# Patient Record
Sex: MALE | Race: WHITE | Employment: FULL TIME | URBAN - METROPOLITAN AREA
[De-identification: names, ages, dates, MRNs, and addresses within clinical notes are randomized per-mention and may not be internally consistent; named-entity substitution may affect disease eponyms.]

---

## 2017-01-13 ENCOUNTER — HOSPITAL ENCOUNTER (EMERGENCY)
Age: 57
Discharge: HOME | End: 2017-01-13
Attending: EMERGENCY MEDICINE
Payer: COMMERCIAL

## 2017-01-13 VITALS
HEIGHT: 72 IN | DIASTOLIC BLOOD PRESSURE: 74 MMHG | SYSTOLIC BLOOD PRESSURE: 160 MMHG | BODY MASS INDEX: 31.83 KG/M2 | WEIGHT: 235 LBS | RESPIRATION RATE: 20 BRPM | OXYGEN SATURATION: 100 % | HEART RATE: 76 BPM | TEMPERATURE: 97.5 F

## 2017-01-13 DIAGNOSIS — R00.2 PALPITATIONS: Primary | ICD-10-CM

## 2017-01-13 LAB
ANION GAP SERPL CALCULATED.3IONS-SCNC: 13 MEQ/L (ref 7–13)
BUN BLDV-MCNC: 17 MG/DL (ref 6–20)
CALCIUM SERPL-MCNC: 8.8 MG/DL (ref 8.6–10.2)
CHLORIDE BLD-SCNC: 100 MEQ/L (ref 98–107)
CO2: 24 MEQ/L (ref 22–29)
CREAT SERPL-MCNC: 0.86 MG/DL (ref 0.7–1.2)
GFR AFRICAN AMERICAN: >60
GFR NON-AFRICAN AMERICAN: >60
GLUCOSE BLD-MCNC: 97 MG/DL (ref 74–109)
HCT VFR BLD CALC: 41.1 % (ref 42–52)
HEMOGLOBIN: 13.9 G/DL (ref 14–18)
MAGNESIUM: 2 MG/DL (ref 1.7–2.3)
MCH RBC QN AUTO: 29.7 PG (ref 27–31.3)
MCHC RBC AUTO-ENTMCNC: 33.8 % (ref 33–37)
MCV RBC AUTO: 88 FL (ref 80–100)
PDW BLD-RTO: 12.9 % (ref 11.5–14.5)
PLATELET # BLD: 74 K/UL (ref 130–400)
PLATELET SLIDE REVIEW: ABNORMAL
POTASSIUM SERPL-SCNC: 3.7 MEQ/L (ref 3.5–5.1)
RBC # BLD: 4.68 M/UL (ref 4.7–6.1)
SODIUM BLD-SCNC: 137 MEQ/L (ref 132–144)
TROPONIN: <0.01 NG/ML (ref 0–0.01)
TSH SERPL DL<=0.05 MIU/L-ACNC: 2.86 UIU/ML (ref 0.27–4.2)
WBC # BLD: 6.2 K/UL (ref 4.8–10.8)

## 2017-01-13 PROCEDURE — 80048 BASIC METABOLIC PNL TOTAL CA: CPT

## 2017-01-13 PROCEDURE — 84443 ASSAY THYROID STIM HORMONE: CPT

## 2017-01-13 PROCEDURE — 99284 EMERGENCY DEPT VISIT MOD MDM: CPT

## 2017-01-13 PROCEDURE — 84484 ASSAY OF TROPONIN QUANT: CPT

## 2017-01-13 PROCEDURE — 36415 COLL VENOUS BLD VENIPUNCTURE: CPT

## 2017-01-13 PROCEDURE — 83735 ASSAY OF MAGNESIUM: CPT

## 2017-01-13 PROCEDURE — 85027 COMPLETE CBC AUTOMATED: CPT

## 2017-01-13 PROCEDURE — 93005 ELECTROCARDIOGRAM TRACING: CPT

## 2017-01-13 RX ORDER — METOPROLOL SUCCINATE 100 MG/1
TABLET, EXTENDED RELEASE ORAL
COMMUNITY

## 2017-01-13 RX ORDER — ATORVASTATIN CALCIUM 10 MG/1
TABLET, FILM COATED ORAL
COMMUNITY

## 2017-01-13 RX ORDER — LISINOPRIL 10 MG/1
TABLET ORAL
COMMUNITY

## 2017-01-13 ASSESSMENT — ENCOUNTER SYMPTOMS
VOMITING: 0
COUGH: 0
SHORTNESS OF BREATH: 0

## 2017-01-13 ASSESSMENT — PAIN SCALES - GENERAL: PAINLEVEL_OUTOF10: 2

## 2017-01-13 ASSESSMENT — PAIN DESCRIPTION - DESCRIPTORS: DESCRIPTORS: PRESSURE

## 2017-01-13 ASSESSMENT — PAIN DESCRIPTION - LOCATION: LOCATION: CHEST

## 2017-01-16 LAB
EKG ATRIAL RATE: 73 BPM
EKG P AXIS: 69 DEGREES
EKG P-R INTERVAL: 196 MS
EKG Q-T INTERVAL: 394 MS
EKG QRS DURATION: 88 MS
EKG QTC CALCULATION (BAZETT): 434 MS
EKG R AXIS: 22 DEGREES
EKG T AXIS: 48 DEGREES
EKG VENTRICULAR RATE: 73 BPM

## 2017-01-17 ENCOUNTER — HOSPITAL ENCOUNTER (OUTPATIENT)
Dept: GENERAL RADIOLOGY | Age: 57
Discharge: HOME | End: 2017-01-17
Payer: COMMERCIAL

## 2017-01-17 DIAGNOSIS — R06.89 DIFFICULTY BREATHING: ICD-10-CM

## 2017-01-17 PROCEDURE — 71020 XR CHEST STANDARD TWO VW: CPT

## 2017-01-30 ENCOUNTER — HOSPITAL ENCOUNTER (OUTPATIENT)
Dept: PULMONOLOGY | Age: 57
Discharge: HOME | End: 2017-01-30
Payer: COMMERCIAL

## 2017-01-30 PROCEDURE — 94060 EVALUATION OF WHEEZING: CPT

## 2017-05-08 ENCOUNTER — HOSPITAL ENCOUNTER (OUTPATIENT)
Dept: CT IMAGING | Age: 57
Discharge: HOME | End: 2017-05-08
Payer: COMMERCIAL

## 2017-05-08 ENCOUNTER — HOSPITAL ENCOUNTER (OUTPATIENT)
Dept: PULMONOLOGY | Age: 57
Discharge: HOME | End: 2017-05-08
Payer: COMMERCIAL

## 2017-05-08 VITALS — WEIGHT: 240 LBS | BODY MASS INDEX: 32.51 KG/M2 | HEIGHT: 72 IN

## 2017-05-08 DIAGNOSIS — R06.02 BREATH SHORTNESS: ICD-10-CM

## 2017-05-08 PROCEDURE — 94729 DIFFUSING CAPACITY: CPT

## 2017-05-08 PROCEDURE — 94010 BREATHING CAPACITY TEST: CPT

## 2017-05-08 PROCEDURE — 71275 CT ANGIOGRAPHY CHEST: CPT

## 2017-05-08 PROCEDURE — 94726 PLETHYSMOGRAPHY LUNG VOLUMES: CPT

## 2017-05-08 PROCEDURE — 6360000004 HC RX CONTRAST MEDICATION: Performed by: RADIOLOGY

## 2017-05-08 RX ADMIN — IOPAMIDOL 100 ML: 755 INJECTION, SOLUTION INTRAVENOUS at 12:52

## 2017-10-27 LAB
ALBUMIN SERPL-MCNC: 4.2 G/DL (ref 3.9–4.9)
ALP BLD-CCNC: 93 U/L (ref 35–104)
ALT SERPL-CCNC: 71 U/L (ref 0–41)
ANION GAP SERPL CALCULATED.3IONS-SCNC: 14 MEQ/L (ref 7–13)
AST SERPL-CCNC: 82 U/L (ref 0–40)
BILIRUB SERPL-MCNC: 0.7 MG/DL (ref 0–1.2)
BUN BLDV-MCNC: 13 MG/DL (ref 6–20)
CALCIUM SERPL-MCNC: 9.4 MG/DL (ref 8.6–10.2)
CHLORIDE BLD-SCNC: 96 MEQ/L (ref 98–107)
CHOLESTEROL, TOTAL: 148 MG/DL (ref 0–199)
CO2: 23 MEQ/L (ref 22–29)
CREAT SERPL-MCNC: 0.73 MG/DL (ref 0.7–1.2)
GFR AFRICAN AMERICAN: >60
GFR NON-AFRICAN AMERICAN: >60
GLOBULIN: 2.7 G/DL (ref 2.3–3.5)
GLUCOSE BLD-MCNC: 119 MG/DL (ref 74–109)
HDLC SERPL-MCNC: 35 MG/DL (ref 40–59)
LDL CHOLESTEROL CALCULATED: 76 MG/DL (ref 0–129)
POTASSIUM SERPL-SCNC: 4.2 MEQ/L (ref 3.5–5.1)
SODIUM BLD-SCNC: 133 MEQ/L (ref 132–144)
TOTAL PROTEIN: 6.9 G/DL (ref 6.4–8.1)
TRIGL SERPL-MCNC: 187 MG/DL (ref 0–200)

## 2018-06-22 LAB
ALBUMIN SERPL-MCNC: 4 G/DL (ref 3.9–4.9)
ALP BLD-CCNC: 92 U/L (ref 35–104)
ALT SERPL-CCNC: 42 U/L (ref 0–41)
ANION GAP SERPL CALCULATED.3IONS-SCNC: 15 MEQ/L (ref 7–13)
AST SERPL-CCNC: 72 U/L (ref 0–40)
BILIRUB SERPL-MCNC: 1.2 MG/DL (ref 0–1.2)
BUN BLDV-MCNC: 15 MG/DL (ref 6–20)
CALCIUM SERPL-MCNC: 9.6 MG/DL (ref 8.6–10.2)
CHLORIDE BLD-SCNC: 99 MEQ/L (ref 98–107)
CHOLESTEROL, TOTAL: 144 MG/DL (ref 0–199)
CO2: 24 MEQ/L (ref 22–29)
CREAT SERPL-MCNC: 0.71 MG/DL (ref 0.7–1.2)
GFR AFRICAN AMERICAN: >60
GFR NON-AFRICAN AMERICAN: >60
GLOBULIN: 3.3 G/DL (ref 2.3–3.5)
GLUCOSE BLD-MCNC: 128 MG/DL (ref 74–109)
HDLC SERPL-MCNC: 35 MG/DL (ref 40–59)
LDL CHOLESTEROL CALCULATED: 82 MG/DL (ref 0–129)
POTASSIUM SERPL-SCNC: 4.3 MEQ/L (ref 3.5–5.1)
SODIUM BLD-SCNC: 138 MEQ/L (ref 132–144)
TOTAL PROTEIN: 7.3 G/DL (ref 6.4–8.1)
TRIGL SERPL-MCNC: 135 MG/DL (ref 0–200)

## 2020-02-20 LAB
ALBUMIN SERPL-MCNC: 4.5 G/DL (ref 3.5–4.6)
ALP BLD-CCNC: 78 U/L (ref 35–104)
ALT SERPL-CCNC: 22 U/L (ref 0–41)
ANION GAP SERPL CALCULATED.3IONS-SCNC: 16 MEQ/L (ref 9–15)
AST SERPL-CCNC: 25 U/L (ref 0–40)
BILIRUB SERPL-MCNC: 0.7 MG/DL (ref 0.2–0.7)
BUN BLDV-MCNC: 15 MG/DL (ref 6–20)
CALCIUM SERPL-MCNC: 9.6 MG/DL (ref 8.5–9.9)
CHLORIDE BLD-SCNC: 103 MEQ/L (ref 95–107)
CHOLESTEROL, FASTING: 124 MG/DL (ref 0–199)
CO2: 20 MEQ/L (ref 20–31)
CREAT SERPL-MCNC: 0.73 MG/DL (ref 0.7–1.2)
GFR AFRICAN AMERICAN: >60
GFR NON-AFRICAN AMERICAN: >60
GLOBULIN: 2.7 G/DL (ref 2.3–3.5)
GLUCOSE FASTING: 111 MG/DL (ref 70–99)
HDLC SERPL-MCNC: 39 MG/DL (ref 40–59)
LDL CHOLESTEROL CALCULATED: 57 MG/DL (ref 0–129)
POTASSIUM SERPL-SCNC: 4.4 MEQ/L (ref 3.4–4.9)
SODIUM BLD-SCNC: 139 MEQ/L (ref 135–144)
TOTAL PROTEIN: 7.2 G/DL (ref 6.3–8)
TRIGLYCERIDE, FASTING: 138 MG/DL (ref 0–150)

## 2020-09-17 LAB
ALBUMIN SERPL-MCNC: 4.4 G/DL (ref 3.5–4.6)
ALP BLD-CCNC: 79 U/L (ref 35–104)
ALT SERPL-CCNC: 25 U/L (ref 0–41)
ANION GAP SERPL CALCULATED.3IONS-SCNC: 11 MEQ/L (ref 9–15)
AST SERPL-CCNC: 25 U/L (ref 0–40)
BILIRUB SERPL-MCNC: 0.6 MG/DL (ref 0.2–0.7)
BUN BLDV-MCNC: 14 MG/DL (ref 6–20)
CALCIUM SERPL-MCNC: 9.4 MG/DL (ref 8.5–9.9)
CHLORIDE BLD-SCNC: 102 MEQ/L (ref 95–107)
CHOLESTEROL, TOTAL: 122 MG/DL (ref 0–199)
CO2: 25 MEQ/L (ref 20–31)
CREAT SERPL-MCNC: 0.76 MG/DL (ref 0.7–1.2)
GFR AFRICAN AMERICAN: >60
GFR NON-AFRICAN AMERICAN: >60
GLOBULIN: 2.6 G/DL (ref 2.3–3.5)
GLUCOSE BLD-MCNC: 119 MG/DL (ref 70–99)
HDLC SERPL-MCNC: 36 MG/DL (ref 40–59)
LDL CHOLESTEROL CALCULATED: 55 MG/DL (ref 0–129)
POTASSIUM SERPL-SCNC: 4.6 MEQ/L (ref 3.4–4.9)
SODIUM BLD-SCNC: 138 MEQ/L (ref 135–144)
TOTAL PROTEIN: 7 G/DL (ref 6.3–8)
TRIGL SERPL-MCNC: 154 MG/DL (ref 0–150)

## 2023-02-04 PROBLEM — R79.89 ABNORMAL LFTS: Status: ACTIVE | Noted: 2023-02-04

## 2023-02-04 PROBLEM — D73.1 HYPERSPLENISM: Status: ACTIVE | Noted: 2023-02-04

## 2023-02-04 PROBLEM — D69.6 THROMBOCYTOPENIA (CMS-HCC): Status: ACTIVE | Noted: 2023-02-04

## 2023-02-04 PROBLEM — K76.0 FATTY LIVER: Status: ACTIVE | Noted: 2023-02-04

## 2023-02-04 PROBLEM — R09.81 CHRONIC NASAL CONGESTION: Status: ACTIVE | Noted: 2023-02-04

## 2023-02-04 PROBLEM — I25.10 ATHEROSCLEROSIS OF NATIVE CORONARY ARTERY WITHOUT ANGINA PECTORIS: Status: ACTIVE | Noted: 2023-02-04

## 2023-02-04 PROBLEM — F17.200 NICOTINE DEPENDENCE: Status: ACTIVE | Noted: 2023-02-04

## 2023-02-04 PROBLEM — K56.609 SMALL BOWEL OBSTRUCTION (MULTI): Status: ACTIVE | Noted: 2023-02-04

## 2023-02-04 PROBLEM — R41.3 MEMORY CHANGES: Status: ACTIVE | Noted: 2023-02-04

## 2023-02-04 PROBLEM — R06.83 SNORING: Status: ACTIVE | Noted: 2023-02-04

## 2023-02-04 PROBLEM — H69.90 EUSTACHIAN TUBE DYSFUNCTION: Status: ACTIVE | Noted: 2023-02-04

## 2023-02-04 PROBLEM — R05.9 COUGH: Status: ACTIVE | Noted: 2023-02-04

## 2023-02-04 PROBLEM — N52.9 INABILITY TO MAINTAIN ERECTION: Status: ACTIVE | Noted: 2023-02-04

## 2023-02-04 PROBLEM — E66.811 CLASS 1 OBESITY WITH BODY MASS INDEX (BMI) OF 33.0 TO 33.9 IN ADULT: Status: ACTIVE | Noted: 2023-02-04

## 2023-02-04 PROBLEM — F10.11 HISTORY OF ALCOHOL ABUSE: Status: ACTIVE | Noted: 2023-02-04

## 2023-02-04 PROBLEM — R00.2 PALPITATIONS: Status: ACTIVE | Noted: 2023-02-04

## 2023-02-04 PROBLEM — R40.0 SOMNOLENCE, DAYTIME: Status: ACTIVE | Noted: 2023-02-04

## 2023-02-04 PROBLEM — Z98.61 HISTORY OF PTCA: Status: ACTIVE | Noted: 2023-02-04

## 2023-02-04 PROBLEM — H90.3 BILATERAL SENSORINEURAL HEARING LOSS: Status: ACTIVE | Noted: 2023-02-04

## 2023-02-04 PROBLEM — E11.9 TYPE 2 DIABETES MELLITUS WITHOUT COMPLICATION, WITHOUT LONG-TERM CURRENT USE OF INSULIN (MULTI): Status: ACTIVE | Noted: 2023-02-04

## 2023-02-04 PROBLEM — K74.60 CIRRHOSIS (MULTI): Status: ACTIVE | Noted: 2023-02-04

## 2023-02-04 PROBLEM — Z96.22 RETAINED MYRINGOTOMY TUBE: Status: ACTIVE | Noted: 2023-02-04

## 2023-02-04 PROBLEM — H91.93 HEARING LOSS, BILATERAL: Status: ACTIVE | Noted: 2023-02-04

## 2023-02-04 PROBLEM — E66.9 CLASS 1 OBESITY WITH BODY MASS INDEX (BMI) OF 33.0 TO 33.9 IN ADULT: Status: ACTIVE | Noted: 2023-02-04

## 2023-02-04 PROBLEM — I25.2 PAST MYOCARDIAL INFARCTION: Status: ACTIVE | Noted: 2023-02-04

## 2023-02-04 PROBLEM — R42 DIZZINESS: Status: ACTIVE | Noted: 2023-02-04

## 2023-02-04 PROBLEM — R79.89 ELEVATED SERUM CREATININE: Status: ACTIVE | Noted: 2023-02-04

## 2023-02-04 PROBLEM — E78.5 HYPERLIPIDEMIA: Status: ACTIVE | Noted: 2023-02-04

## 2023-02-04 PROBLEM — I10 HYPERTENSION: Status: ACTIVE | Noted: 2023-02-04

## 2023-02-04 PROBLEM — G47.00 INSOMNIA: Status: ACTIVE | Noted: 2023-02-04

## 2023-02-04 RX ORDER — METFORMIN HYDROCHLORIDE 500 MG/1
1 TABLET, EXTENDED RELEASE ORAL
COMMUNITY
Start: 2019-02-04 | End: 2023-03-21 | Stop reason: SDUPTHER

## 2023-02-04 RX ORDER — NITROGLYCERIN 0.4 MG/1
1 TABLET SUBLINGUAL EVERY 5 MIN PRN
COMMUNITY
End: 2023-12-11

## 2023-02-04 RX ORDER — METOPROLOL SUCCINATE 50 MG/1
1 TABLET, EXTENDED RELEASE ORAL DAILY
COMMUNITY
Start: 2022-02-07 | End: 2023-12-11 | Stop reason: SDUPTHER

## 2023-02-04 RX ORDER — ASPIRIN 81 MG/1
1 TABLET ORAL DAILY
COMMUNITY
Start: 2017-01-17

## 2023-02-04 RX ORDER — ROSUVASTATIN CALCIUM 40 MG/1
1 TABLET, COATED ORAL DAILY
COMMUNITY
Start: 2016-03-31 | End: 2023-12-11 | Stop reason: SDUPTHER

## 2023-02-04 RX ORDER — LISINOPRIL 10 MG/1
1 TABLET ORAL DAILY
COMMUNITY
Start: 2016-03-31 | End: 2023-12-11 | Stop reason: SDUPTHER

## 2023-02-04 RX ORDER — TRIAMCINOLONE ACETONIDE 1 MG/G
1 CREAM TOPICAL 2 TIMES DAILY PRN
COMMUNITY
Start: 2019-06-11 | End: 2024-03-18 | Stop reason: SDUPTHER

## 2023-03-21 DIAGNOSIS — E11.9 TYPE 2 DIABETES MELLITUS WITHOUT COMPLICATION, WITHOUT LONG-TERM CURRENT USE OF INSULIN (MULTI): Primary | ICD-10-CM

## 2023-03-22 ENCOUNTER — APPOINTMENT (OUTPATIENT)
Dept: PRIMARY CARE | Facility: CLINIC | Age: 63
End: 2023-03-22

## 2023-03-22 RX ORDER — METFORMIN HYDROCHLORIDE 500 MG/1
500 TABLET, EXTENDED RELEASE ORAL
Qty: 90 TABLET | Refills: 0 | Status: SHIPPED | OUTPATIENT
Start: 2023-03-22 | End: 2023-06-29 | Stop reason: SDUPTHER

## 2023-06-29 DIAGNOSIS — E11.9 TYPE 2 DIABETES MELLITUS WITHOUT COMPLICATION, WITHOUT LONG-TERM CURRENT USE OF INSULIN (MULTI): ICD-10-CM

## 2023-06-29 RX ORDER — METFORMIN HYDROCHLORIDE 500 MG/1
500 TABLET, EXTENDED RELEASE ORAL
Qty: 90 TABLET | Refills: 0 | Status: SHIPPED | OUTPATIENT
Start: 2023-06-29 | End: 2023-09-25 | Stop reason: SDUPTHER

## 2023-07-27 LAB
ANION GAP IN SER/PLAS: 12 MMOL/L (ref 10–20)
CALCIUM (MG/DL) IN SER/PLAS: 10.1 MG/DL (ref 8.6–10.3)
CARBON DIOXIDE, TOTAL (MMOL/L) IN SER/PLAS: 27 MMOL/L (ref 21–32)
CHLORIDE (MMOL/L) IN SER/PLAS: 102 MMOL/L (ref 98–107)
CREATININE (MG/DL) IN SER/PLAS: 0.99 MG/DL (ref 0.5–1.3)
ESTIMATED AVERAGE GLUCOSE FOR HBA1C: 103 MG/DL
GFR MALE: 86 ML/MIN/1.73M2
GLUCOSE (MG/DL) IN SER/PLAS: 83 MG/DL (ref 74–99)
HEMOGLOBIN A1C/HEMOGLOBIN TOTAL IN BLOOD: 5.2 %
POTASSIUM (MMOL/L) IN SER/PLAS: 4 MMOL/L (ref 3.5–5.3)
SODIUM (MMOL/L) IN SER/PLAS: 137 MMOL/L (ref 136–145)
UREA NITROGEN (MG/DL) IN SER/PLAS: 18 MG/DL (ref 6–23)

## 2023-07-31 ENCOUNTER — OFFICE VISIT (OUTPATIENT)
Dept: PRIMARY CARE | Facility: CLINIC | Age: 63
End: 2023-07-31
Payer: COMMERCIAL

## 2023-07-31 VITALS
TEMPERATURE: 97.7 F | HEART RATE: 71 BPM | WEIGHT: 222 LBS | SYSTOLIC BLOOD PRESSURE: 104 MMHG | HEIGHT: 71 IN | DIASTOLIC BLOOD PRESSURE: 69 MMHG | BODY MASS INDEX: 31.08 KG/M2 | OXYGEN SATURATION: 98 %

## 2023-07-31 DIAGNOSIS — I25.10 ATHEROSCLEROSIS OF NATIVE CORONARY ARTERY OF NATIVE HEART WITHOUT ANGINA PECTORIS: ICD-10-CM

## 2023-07-31 DIAGNOSIS — I10 PRIMARY HYPERTENSION: Primary | ICD-10-CM

## 2023-07-31 DIAGNOSIS — E78.2 MIXED HYPERLIPIDEMIA: ICD-10-CM

## 2023-07-31 DIAGNOSIS — E11.9 TYPE 2 DIABETES MELLITUS WITHOUT COMPLICATION, WITHOUT LONG-TERM CURRENT USE OF INSULIN (MULTI): ICD-10-CM

## 2023-07-31 DIAGNOSIS — D69.6 THROMBOCYTOPENIA (CMS-HCC): ICD-10-CM

## 2023-07-31 DIAGNOSIS — E66.9 CLASS 1 OBESITY WITH SERIOUS COMORBIDITY AND BODY MASS INDEX (BMI) OF 30.0 TO 30.9 IN ADULT, UNSPECIFIED OBESITY TYPE: ICD-10-CM

## 2023-07-31 DIAGNOSIS — K70.30 ALCOHOLIC CIRRHOSIS OF LIVER WITHOUT ASCITES (MULTI): ICD-10-CM

## 2023-07-31 DIAGNOSIS — Z12.5 PROSTATE CANCER SCREENING: ICD-10-CM

## 2023-07-31 PROBLEM — F10.10 ALCOHOL ABUSE: Status: ACTIVE | Noted: 2023-07-31

## 2023-07-31 PROBLEM — H69.93 DYSFUNCTION OF BOTH EUSTACHIAN TUBES: Status: ACTIVE | Noted: 2019-02-11

## 2023-07-31 PROBLEM — R06.89 DIFFICULTY BREATHING: Status: ACTIVE | Noted: 2023-07-31

## 2023-07-31 PROBLEM — R19.8 ANAL DISCHARGE: Status: ACTIVE | Noted: 2023-07-31

## 2023-07-31 PROBLEM — Z98.890 HISTORY OF CARDIAC CATHETERIZATION: Status: ACTIVE | Noted: 2023-07-31

## 2023-07-31 PROBLEM — K76.6 PORTAL HYPERTENSION (MULTI): Status: ACTIVE | Noted: 2019-03-02

## 2023-07-31 PROBLEM — M77.11 LATERAL EPICONDYLITIS OF BOTH ELBOWS: Status: ACTIVE | Noted: 2023-07-31

## 2023-07-31 PROBLEM — M70.30 BURSITIS OF ELBOW: Status: ACTIVE | Noted: 2023-07-31

## 2023-07-31 PROBLEM — R10.9 FLANK PAIN: Status: ACTIVE | Noted: 2023-07-31

## 2023-07-31 PROBLEM — R06.83 SNORING: Status: RESOLVED | Noted: 2023-02-04 | Resolved: 2023-07-31

## 2023-07-31 PROBLEM — Z86.711 HISTORY OF PULMONARY EMBOLISM: Status: ACTIVE | Noted: 2023-07-31

## 2023-07-31 PROBLEM — Z86.39 HISTORY OF ELEVATED LIPIDS: Status: ACTIVE | Noted: 2023-07-31

## 2023-07-31 PROBLEM — Z20.822 CONTACT WITH AND (SUSPECTED) EXPOSURE TO COVID-19: Status: ACTIVE | Noted: 2022-04-13

## 2023-07-31 PROBLEM — H93.11 TINNITUS, RIGHT EAR: Status: ACTIVE | Noted: 2020-10-07

## 2023-07-31 PROBLEM — M77.12 LATERAL EPICONDYLITIS OF BOTH ELBOWS: Status: ACTIVE | Noted: 2023-07-31

## 2023-07-31 PROBLEM — Z86.69 HISTORY OF EAR DISORDER: Status: ACTIVE | Noted: 2023-07-31

## 2023-07-31 PROBLEM — R82.90 ABNORMAL FINDING IN URINE: Status: ACTIVE | Noted: 2023-07-31

## 2023-07-31 PROBLEM — L98.9 INFLAMMATORY DERMATOSIS: Status: ACTIVE | Noted: 2023-07-31

## 2023-07-31 PROBLEM — Z20.822 CONTACT WITH AND (SUSPECTED) EXPOSURE TO COVID-19: Status: RESOLVED | Noted: 2022-04-13 | Resolved: 2023-07-31

## 2023-07-31 PROCEDURE — 3044F HG A1C LEVEL LT 7.0%: CPT | Performed by: FAMILY MEDICINE

## 2023-07-31 PROCEDURE — 99214 OFFICE O/P EST MOD 30 MIN: CPT | Performed by: FAMILY MEDICINE

## 2023-07-31 PROCEDURE — 3008F BODY MASS INDEX DOCD: CPT | Performed by: FAMILY MEDICINE

## 2023-07-31 PROCEDURE — 3078F DIAST BP <80 MM HG: CPT | Performed by: FAMILY MEDICINE

## 2023-07-31 PROCEDURE — 1036F TOBACCO NON-USER: CPT | Performed by: FAMILY MEDICINE

## 2023-07-31 PROCEDURE — 3074F SYST BP LT 130 MM HG: CPT | Performed by: FAMILY MEDICINE

## 2023-07-31 PROCEDURE — 4010F ACE/ARB THERAPY RXD/TAKEN: CPT | Performed by: FAMILY MEDICINE

## 2023-07-31 RX ORDER — METOPROLOL TARTRATE 100 MG/1
50 TABLET ORAL 2 TIMES DAILY
COMMUNITY
Start: 2011-10-07 | End: 2023-12-11

## 2023-07-31 ASSESSMENT — PATIENT HEALTH QUESTIONNAIRE - PHQ9
SUM OF ALL RESPONSES TO PHQ9 QUESTIONS 1 AND 2: 0
2. FEELING DOWN, DEPRESSED OR HOPELESS: NOT AT ALL
1. LITTLE INTEREST OR PLEASURE IN DOING THINGS: NOT AT ALL

## 2023-07-31 NOTE — PATIENT INSTRUCTIONS
Follow up in 3 months with labs to be done PRIOR.    It was a pleasure to see you today. Thank you for choosing us for your health care needs.    If you have lab or other testing completed and have not been informed of results within one week, please call the office for your results.    If you haven't done so, consider signing up for [facility] [employer], the [facility] personal health record. Ask the staff how you can get started.

## 2023-07-31 NOTE — PROGRESS NOTES
Subjective   Patient ID: Yuri Reynolds is a 63 y.o. male who presents for follow up monitoring and management of multiple medical conditions.      HPI     No new concerns      Patient has hypertension.  Pt does not monitor BP readings at home.   He denies CP, SOB, and LE edema.   Patient is compliant with his antihypertensive therapy and denies any noted side effects.  He follows up with a cardiologist.      He has hyperlipidemia.  Pt does try to limit the amount of fatty foods and high cholesterol foods that are consumed.  He is compliant with the dosing of his statin therapy and denies any noted side effects.     Patient has DM type 2.   Patient does try to limit the amount of carbohydrates that are consumed.  Denies any hypoglycemic symptoms or readings.  He continues to tolerate the medications well.  He states that he does regularly check his blood sugar.      Patient has coronary artery disease and follows with cardiology as well.  He denies any chest pain, exertional shortness of breath, or limitation in his exertional activities.     He has cirrhosis of the liver.  Patient has continued to avoid alcohol.  He was previously evaluated by hepatology, Dr. Hartmann, but has not followed up since October 2019.  He denies any jaundice or changes in stool/urine color.  Patient also denies any melena or hematochezia.     He has had some thrombocytopenia.  He denies any abnormal bruising or bleeding at this time.     Review of Systems  Constitutional: Patient denies any fever, chills, loss of appetite, or unexplained weight loss.  Cardiovascular: Patient denies any chest pain, shortness of breath with exertion, tachycardia, palpitations, orthopnea, or paroxysmal nocturnal dyspnea.  Respiratory: Patient denies any cough, shortness breath, or wheezing.  Skin: Denies any rashes or skin lesions.   Neurology: Patient denies any new motor or sensory losses.  Denies any numbness, tingling, weakness, and incoordination of the  "extremities.  Patient also denies any tremor, seizures, or gait instability.  Endocrinology: Denies any polyuria, polydipsia, polyphagia, or heat/cold intolerance.    Objective   /69   Pulse 71   Temp 36.5 °C (97.7 °F)   Ht 1.803 m (5' 11\")   Wt 101 kg (222 lb)   SpO2 98%   BMI 30.96 kg/m²     Physical Exam  General Appearance: Alert and cooperative, in no acute distress, well-developed/well-nourished overweight male.    Neck: Supple and without adenopathy or rigidity.  There is no JVD at 90° and no carotid bruits are noted.  There is no thyromegaly, thyroid tenderness, or palpable thyroid nodules.  Heart: Regular rate and rhythm without murmur or ectopy.  Respiratory: Lungs are clear to auscultation bilaterally with good air exchange.  Good respiratory effort and no accessory muscle use.  Skin: Good turgor, moist, warm and without rashes or lesions.  Neurological exam: Alert and oriented ×3, no tremor, normal gait.  Extremities: No clubbing, cyanosis, or edema    Assessment/Plan     HTN: Blood pressure appears adequately controlled and we will continue with the current antihypertensive therapy.     Hyperlipidemia: Stable based on 5/18/22 labs. Patient will continue with the current medication. Dietary changes, exercise, and maintenance of healthy weight were discussed at length.     Diabetes mellitus type 2:   His most recent labs reveal hemoglobin A1c was:  Lab Results   Component Value Date    HGBA1C 5.2 07/27/2023   Patient was advised to have a diabetic eye exam annually.  Patient was also advised to check the feet on a regular basis.     CAD: Stable based on symptoms.   He is without any worrisome signs or symptoms for unstable angina.  Risk factor modification was discussed.  He will continue follow-up with cardiology as well.     Cirrhosis / Hx of alcohol abuse: He has continued to abstain from alcohol.  He has delayed follow up with hepatology and has not scheduled the Fibroscan ordered by " hepatology.  12/20/2022: Pt was again encouraged to schedule follow up and testing.     Thrombocytopenia: Stable based on most recent lab evaluation.   We will continue to monitor.  This is likely a result of his hypersplenism which is likely from his cirrhosis and increased portal pressure.     Obesity: Dietary changes, exercise, and maintenance of a healthy weight were discussed at length.        PSA due 5/19/23  COLONOSCOPY DUE 3/7/2022         Orders Placed This Encounter   Procedures    Prostate Specific Antigen, Screen    Comprehensive Metabolic Panel    Hemoglobin A1C    Lipid Panel    Albumin , Urine Random     Requested Prescriptions      No prescriptions requested or ordered in this encounter   We will continue on the current medications without change.

## 2023-09-25 DIAGNOSIS — E11.9 TYPE 2 DIABETES MELLITUS WITHOUT COMPLICATION, WITHOUT LONG-TERM CURRENT USE OF INSULIN (MULTI): ICD-10-CM

## 2023-09-25 RX ORDER — METFORMIN HYDROCHLORIDE 500 MG/1
500 TABLET, EXTENDED RELEASE ORAL
Qty: 90 TABLET | Refills: 0 | Status: SHIPPED | OUTPATIENT
Start: 2023-09-25 | End: 2023-12-18 | Stop reason: SDUPTHER

## 2023-10-31 ENCOUNTER — APPOINTMENT (OUTPATIENT)
Dept: PRIMARY CARE | Facility: CLINIC | Age: 63
End: 2023-10-31

## 2023-12-06 ENCOUNTER — LAB (OUTPATIENT)
Dept: LAB | Facility: LAB | Age: 63
End: 2023-12-06
Payer: COMMERCIAL

## 2023-12-06 DIAGNOSIS — E11.9 TYPE 2 DIABETES MELLITUS WITHOUT COMPLICATION, WITHOUT LONG-TERM CURRENT USE OF INSULIN (MULTI): ICD-10-CM

## 2023-12-06 DIAGNOSIS — Z12.5 PROSTATE CANCER SCREENING: ICD-10-CM

## 2023-12-06 DIAGNOSIS — E78.2 MIXED HYPERLIPIDEMIA: ICD-10-CM

## 2023-12-06 LAB
ALBUMIN SERPL BCP-MCNC: 4.5 G/DL (ref 3.4–5)
ALP SERPL-CCNC: 76 U/L (ref 33–136)
ALT SERPL W P-5'-P-CCNC: 26 U/L (ref 10–52)
ANION GAP SERPL CALC-SCNC: 12 MMOL/L (ref 10–20)
AST SERPL W P-5'-P-CCNC: 23 U/L (ref 9–39)
BILIRUB SERPL-MCNC: 0.8 MG/DL (ref 0–1.2)
BUN SERPL-MCNC: 18 MG/DL (ref 6–23)
CALCIUM SERPL-MCNC: 9.5 MG/DL (ref 8.6–10.3)
CHLORIDE SERPL-SCNC: 102 MMOL/L (ref 98–107)
CHOLEST SERPL-MCNC: 135 MG/DL (ref 0–199)
CHOLESTEROL/HDL RATIO: 3.9
CO2 SERPL-SCNC: 29 MMOL/L (ref 21–32)
CREAT SERPL-MCNC: 0.94 MG/DL (ref 0.5–1.3)
CREAT UR-MCNC: 143.7 MG/DL (ref 20–370)
EST. AVERAGE GLUCOSE BLD GHB EST-MCNC: 111 MG/DL
GFR SERPL CREATININE-BSD FRML MDRD: >90 ML/MIN/1.73M*2
GLUCOSE SERPL-MCNC: 116 MG/DL (ref 74–99)
HBA1C MFR BLD: 5.5 %
HDLC SERPL-MCNC: 35 MG/DL
LDLC SERPL CALC-MCNC: 65 MG/DL
MICROALBUMIN UR-MCNC: 10.3 MG/L
MICROALBUMIN/CREAT UR: 7.2 UG/MG CREAT
NON HDL CHOLESTEROL: 100 MG/DL (ref 0–149)
POTASSIUM SERPL-SCNC: 4.2 MMOL/L (ref 3.5–5.3)
PROT SERPL-MCNC: 6.7 G/DL (ref 6.4–8.2)
PSA SERPL-MCNC: 0.39 NG/ML
SODIUM SERPL-SCNC: 139 MMOL/L (ref 136–145)
TRIGL SERPL-MCNC: 175 MG/DL (ref 0–149)
VLDL: 35 MG/DL (ref 0–40)

## 2023-12-06 PROCEDURE — 82570 ASSAY OF URINE CREATININE: CPT

## 2023-12-06 PROCEDURE — 83036 HEMOGLOBIN GLYCOSYLATED A1C: CPT

## 2023-12-06 PROCEDURE — 84153 ASSAY OF PSA TOTAL: CPT

## 2023-12-06 PROCEDURE — 80061 LIPID PANEL: CPT

## 2023-12-06 PROCEDURE — 36415 COLL VENOUS BLD VENIPUNCTURE: CPT

## 2023-12-06 PROCEDURE — 82043 UR ALBUMIN QUANTITATIVE: CPT

## 2023-12-06 PROCEDURE — 80053 COMPREHEN METABOLIC PANEL: CPT

## 2023-12-11 ENCOUNTER — OFFICE VISIT (OUTPATIENT)
Dept: CARDIOLOGY | Facility: CLINIC | Age: 63
End: 2023-12-11
Payer: COMMERCIAL

## 2023-12-11 VITALS
WEIGHT: 244 LBS | HEART RATE: 63 BPM | SYSTOLIC BLOOD PRESSURE: 130 MMHG | DIASTOLIC BLOOD PRESSURE: 70 MMHG | BODY MASS INDEX: 34.03 KG/M2

## 2023-12-11 DIAGNOSIS — E11.9 TYPE 2 DIABETES MELLITUS WITHOUT COMPLICATION, WITHOUT LONG-TERM CURRENT USE OF INSULIN (MULTI): ICD-10-CM

## 2023-12-11 DIAGNOSIS — I10 PRIMARY HYPERTENSION: ICD-10-CM

## 2023-12-11 DIAGNOSIS — E78.2 MIXED HYPERLIPIDEMIA: ICD-10-CM

## 2023-12-11 DIAGNOSIS — I25.10 ATHEROSCLEROSIS OF NATIVE CORONARY ARTERY OF NATIVE HEART WITHOUT ANGINA PECTORIS: Primary | ICD-10-CM

## 2023-12-11 DIAGNOSIS — Z98.61 HISTORY OF PTCA: ICD-10-CM

## 2023-12-11 PROCEDURE — 1036F TOBACCO NON-USER: CPT | Performed by: INTERNAL MEDICINE

## 2023-12-11 PROCEDURE — 3048F LDL-C <100 MG/DL: CPT | Performed by: INTERNAL MEDICINE

## 2023-12-11 PROCEDURE — 3008F BODY MASS INDEX DOCD: CPT | Performed by: INTERNAL MEDICINE

## 2023-12-11 PROCEDURE — 99214 OFFICE O/P EST MOD 30 MIN: CPT | Performed by: INTERNAL MEDICINE

## 2023-12-11 PROCEDURE — 3044F HG A1C LEVEL LT 7.0%: CPT | Performed by: INTERNAL MEDICINE

## 2023-12-11 PROCEDURE — 3078F DIAST BP <80 MM HG: CPT | Performed by: INTERNAL MEDICINE

## 2023-12-11 PROCEDURE — 4010F ACE/ARB THERAPY RXD/TAKEN: CPT | Performed by: INTERNAL MEDICINE

## 2023-12-11 PROCEDURE — 3075F SYST BP GE 130 - 139MM HG: CPT | Performed by: INTERNAL MEDICINE

## 2023-12-11 PROCEDURE — 3061F NEG MICROALBUMINURIA REV: CPT | Performed by: INTERNAL MEDICINE

## 2023-12-11 RX ORDER — ROSUVASTATIN CALCIUM 40 MG/1
40 TABLET, COATED ORAL DAILY
Qty: 90 TABLET | Refills: 3 | Status: SHIPPED | OUTPATIENT
Start: 2023-12-11

## 2023-12-11 RX ORDER — METOPROLOL SUCCINATE 50 MG/1
50 TABLET, EXTENDED RELEASE ORAL DAILY
Qty: 90 TABLET | Refills: 3 | Status: SHIPPED | OUTPATIENT
Start: 2023-12-11

## 2023-12-11 RX ORDER — LISINOPRIL 10 MG/1
10 TABLET ORAL DAILY
Qty: 90 TABLET | Refills: 3 | Status: SHIPPED | OUTPATIENT
Start: 2023-12-11

## 2023-12-11 NOTE — PROGRESS NOTES
Patient:  Yuri Reynolds  YOB: 1960  MRN: [id number]       HPI:       Yuri Reynolds is a 63 y.o. male who returns today for cardiac follow-up.  He has a history of atherosclerotic heart disease with remote inferior wall myocardial infarction 1999. He had an apical myocardial infarction in August 2003. He underwent angioplasty and stenting of the right coronary artery in 1999 and in 2003. A myocardial perfusion study in April 2015 was negative for ischemia with calculated LV ejection fraction of 65%. He has a history of essential hypertension, type 2 diabetes mellitus, and hyperlipidemia. He stopped smoking tobacco back in January 2017.      He previously noted some intermittent palpitations and a Holter monitor showed normal sinus rhythm with occasional PACs and PVCs. His symptoms correlated with the ectopy. He also had some exertional shortness breath in the setting of progressive weight gain. Extensive pulmonary evaluation was unremarkable. An echocardiogram in August 2017 showed normal LV systolic function and normal valvular structure and function. He was treated in 2019 for a bowel obstruction. He was managed conservatively and it resolved. In the process he was diagnosed with cirrhosis of the liver. He was drinking quite heavily at the time but has completely stopped using any alcohol. He also stopped smoking.     He has been doing well since his last visit.  He works full-time for [employer].  He is without any specific cardiovascular related complaints. He denies any chest pain or shortness of breath. He denies any orthopnea, PND, or increasing peripheral edema. He denies any palpitations, near-syncope, or syncope. He denies any fever, chills, or cough. He denies any nausea, vomiting, or diaphoresis. He denies any hemoptysis, hematemesis, melena, or hematochezia. Lab studies December 6, 2023 showed a normal comprehensive metabolic profile with exception of glucose 116.   Hemoglobin A1c was 5.5.  Cholesterol 135 with HDL 35, LDL 65, and triglycerides 175.  PSA was normal.  Other details as noted below.     The above portion of this note was dictated by me using voice recognition software. I personally performed the services described in the documentation. The scribe entering the documentation below was in my presence. I affirm that the information is both accurate and complete.       Objective:     Vitals:    12/11/23 1549   BP: 130/70   Pulse: 63       Wt Readings from Last 4 Encounters:   12/11/23 111 kg (244 lb)   07/31/23 101 kg (222 lb)   12/20/22 108 kg (237 lb)   12/19/22 108 kg (237 lb)       Allergies:     No Known Allergies     Medications:     Current Outpatient Medications   Medication Instructions    aspirin 81 mg EC tablet 1 tablet, oral, Daily    lisinopril 10 mg tablet 1 tablet, oral, Daily    metFORMIN XR (GLUCOPHAGE-XR) 500 mg, oral, Daily with evening meal    metoprolol succinate XL (Toprol-XL) 50 mg 24 hr tablet 1 tablet, oral, Daily    rosuvastatin (Crestor) 40 mg tablet 1 tablet, oral, Daily    triamcinolone (Kenalog) 0.1 % cream 1 Application, Topical, 2 times daily PRN, Monday - Friday only       Physical Examination:   GENERAL:  Well developed, well nourished, in no acute distress.  CHEST:  Symmetric and nontender.  NEURO/PSYCH:  Alert and oriented times three with approppriate behavior and responses.  NECK:  Supple, no JVD, no bruit.  LUNGS:  Clear to auscultation bilaterally, normal respiratory effort.  HEART:  Rate and rhythm regular with no evident murmur, no gallop appreciated.        There are no rubs, clicks or heaves.  EXTREMITIES:  Warm with good color, no clubbing or cyanosis.  There is no edema noted.  PERIPHERAL VASCULAR:  Pulses present and equally palpable; 2+ throughout.      Lab:     CBC:   Lab Results   Component Value Date    WBC 5.6 12/15/2022    RBC 5.40 12/15/2022    HGB 15.3 12/15/2022    HCT 46.0 12/15/2022    PLT 84 (L) 12/15/2022         CMP:    Lab Results   Component Value Date     12/06/2023    K 4.2 12/06/2023     12/06/2023    CO2 29 12/06/2023    BUN 18 12/06/2023    CREATININE 0.94 12/06/2023    GLUCOSE 116 (H) 12/06/2023    CALCIUM 9.5 12/06/2023       Lipid Profile:    Lab Results   Component Value Date    TRIG 175 (H) 12/06/2023    HDL 35.0 12/06/2023    LDLCALC 65 12/06/2023       TSH:    Lab Results   Component Value Date    TSH 3.11 02/12/2019       PT/INR:    Lab Results   Component Value Date    PROTIME 13.5 (H) 10/01/2020    INR 1.2 (H) 10/01/2020       HgBA1c:    Lab Results   Component Value Date    HGBA1C 5.5 12/06/2023       BMP:  Lab Results   Component Value Date     12/06/2023     07/27/2023     12/15/2022     09/15/2022    K 4.2 12/06/2023    K 4.0 07/27/2023    K 4.0 12/15/2022    K 4.2 09/15/2022     12/06/2023     07/27/2023     12/15/2022     09/15/2022    CO2 29 12/06/2023    CO2 27 07/27/2023    CO2 28 12/15/2022    CO2 29 09/15/2022    BUN 18 12/06/2023    BUN 18 07/27/2023    BUN 19 12/15/2022    BUN 17 09/15/2022    CREATININE 0.94 12/06/2023    CREATININE 0.99 07/27/2023    CREATININE 0.93 12/15/2022    CREATININE 0.94 09/15/2022       CBC:  Lab Results   Component Value Date    WBC 5.6 12/15/2022    WBC 4.6 05/18/2022    WBC 5.8 04/13/2022    RBC 5.40 12/15/2022    RBC 5.37 05/18/2022    RBC 5.43 04/13/2022    HGB 15.3 12/15/2022    HGB 15.3 05/18/2022    HGB 15.6 04/13/2022    HCT 46.0 12/15/2022    HCT 46.4 05/18/2022    HCT 46.6 04/13/2022    MCV 85 12/15/2022    MCV 86 05/18/2022    MCV 86 04/13/2022    MCHC 33.3 12/15/2022    MCHC 33.0 05/18/2022    MCHC 33.5 04/13/2022    RDW 13.6 12/15/2022    RDW 13.2 05/18/2022    RDW 13.3 04/13/2022    PLT 84 (L) 12/15/2022    PLT 88 (L) 05/18/2022    PLT 85 (L) 04/13/2022       Hepatic Function Panel:    Lab Results   Component Value Date    ALKPHOS 76 12/06/2023    ALT 26 12/06/2023    AST 23 12/06/2023     PROT 6.7 12/06/2023    BILITOT 0.8 12/06/2023    BILIDIR 0.1 10/01/2020       Problem List:     Patient Active Problem List   Diagnosis    Abnormal LFTs    Atherosclerosis of native coronary artery without angina pectoris    Bilateral sensorineural hearing loss    Chronic nasal congestion    Cirrhosis (CMS/HCC)    Cough    Dizziness    Elevated serum creatinine    Eustachian tube dysfunction    Fatty liver    Hearing loss, bilateral    History of PTCA    Hyperlipidemia    Hypersplenism    Hypertension    Inability to maintain erection    Insomnia    Memory changes    Nicotine dependence    Palpitations    Past myocardial infarction    Retained myringotomy tube    Small bowel obstruction (CMS/HCC)    Somnolence, daytime    Thrombocytopenia (CMS/HCC)    Type 2 diabetes mellitus without complication, without long-term current use of insulin (CMS/HCC)    History of alcohol abuse    Class 1 obesity with serious comorbidity and body mass index (BMI) of 30.0 to 30.9 in adult    Abnormal finding in urine    Alcohol abuse    Anal discharge    Bursitis of elbow    Difficulty breathing    Dysfunction of both eustachian tubes    Flank pain    Hemorrhage of rectum and anus    History of cardiac catheterization    History of ear disorder    History of elevated lipids    History of pulmonary embolism    Inflammatory dermatosis    Lateral epicondylitis of both elbows    Portal hypertension (CMS/HCC)    Tinnitus, right ear       Asessment:     Problem List Items Addressed This Visit             ICD-10-CM    Atherosclerosis of native coronary artery without angina pectoris - Primary I25.10    Relevant Medications    metoprolol succinate XL (Toprol-XL) 50 mg 24 hr tablet    rosuvastatin (Crestor) 40 mg tablet    Other Relevant Orders    Follow Up In Cardiology    Comprehensive Metabolic Panel    Lipid Panel    CBC    History of PTCA Z98.61    Relevant Medications    rosuvastatin (Crestor) 40 mg tablet    Other Relevant Orders     Follow Up In Cardiology    Comprehensive Metabolic Panel    Lipid Panel    CBC    Hyperlipidemia E78.5    Relevant Medications    rosuvastatin (Crestor) 40 mg tablet    Other Relevant Orders    Follow Up In Cardiology    Comprehensive Metabolic Panel    Lipid Panel    CBC    Hypertension I10    Relevant Medications    lisinopril 10 mg tablet    metoprolol succinate XL (Toprol-XL) 50 mg 24 hr tablet    Other Relevant Orders    Follow Up In Cardiology    Comprehensive Metabolic Panel    Lipid Panel    CBC    Type 2 diabetes mellitus without complication, without long-term current use of insulin (CMS/[facility]) E11.9    Relevant Medications    rosuvastatin (Crestor) 40 mg tablet    Other Relevant Orders    Follow Up In Cardiology    Comprehensive Metabolic Panel    Lipid Panel    CBC       Scribe Attestation  By signing my name below, I, Halle Brown LPN , [employer]   attest that this documentation has been prepared under the direction and in the presence of Kip Birmingham MD.

## 2023-12-18 ENCOUNTER — OFFICE VISIT (OUTPATIENT)
Dept: PRIMARY CARE | Facility: CLINIC | Age: 63
End: 2023-12-18
Payer: COMMERCIAL

## 2023-12-18 VITALS
DIASTOLIC BLOOD PRESSURE: 64 MMHG | OXYGEN SATURATION: 96 % | HEART RATE: 81 BPM | BODY MASS INDEX: 32.6 KG/M2 | SYSTOLIC BLOOD PRESSURE: 122 MMHG | WEIGHT: 240.7 LBS | HEIGHT: 72 IN | TEMPERATURE: 97.7 F

## 2023-12-18 DIAGNOSIS — I10 PRIMARY HYPERTENSION: Primary | ICD-10-CM

## 2023-12-18 DIAGNOSIS — E78.2 MIXED HYPERLIPIDEMIA: ICD-10-CM

## 2023-12-18 DIAGNOSIS — E66.9 CLASS 1 OBESITY WITH SERIOUS COMORBIDITY AND BODY MASS INDEX (BMI) OF 32.0 TO 32.9 IN ADULT, UNSPECIFIED OBESITY TYPE: ICD-10-CM

## 2023-12-18 DIAGNOSIS — I25.10 ATHEROSCLEROSIS OF NATIVE CORONARY ARTERY OF NATIVE HEART WITHOUT ANGINA PECTORIS: ICD-10-CM

## 2023-12-18 DIAGNOSIS — K70.30 ALCOHOLIC CIRRHOSIS OF LIVER WITHOUT ASCITES (MULTI): ICD-10-CM

## 2023-12-18 DIAGNOSIS — D69.6 THROMBOCYTOPENIA (CMS-HCC): ICD-10-CM

## 2023-12-18 DIAGNOSIS — E11.9 TYPE 2 DIABETES MELLITUS WITHOUT COMPLICATION, WITHOUT LONG-TERM CURRENT USE OF INSULIN (MULTI): ICD-10-CM

## 2023-12-18 DIAGNOSIS — K76.6 PORTAL HYPERTENSION (MULTI): ICD-10-CM

## 2023-12-18 PROBLEM — Z86.39 HISTORY OF ELEVATED LIPIDS: Status: RESOLVED | Noted: 2023-07-31 | Resolved: 2023-12-18

## 2023-12-18 PROBLEM — R10.9 FLANK PAIN: Status: RESOLVED | Noted: 2023-07-31 | Resolved: 2023-12-18

## 2023-12-18 PROCEDURE — 3074F SYST BP LT 130 MM HG: CPT | Performed by: FAMILY MEDICINE

## 2023-12-18 PROCEDURE — 4010F ACE/ARB THERAPY RXD/TAKEN: CPT | Performed by: FAMILY MEDICINE

## 2023-12-18 PROCEDURE — 99214 OFFICE O/P EST MOD 30 MIN: CPT | Performed by: FAMILY MEDICINE

## 2023-12-18 PROCEDURE — 3008F BODY MASS INDEX DOCD: CPT | Performed by: FAMILY MEDICINE

## 2023-12-18 PROCEDURE — 3078F DIAST BP <80 MM HG: CPT | Performed by: FAMILY MEDICINE

## 2023-12-18 PROCEDURE — 1036F TOBACCO NON-USER: CPT | Performed by: FAMILY MEDICINE

## 2023-12-18 RX ORDER — METFORMIN HYDROCHLORIDE 500 MG/1
500 TABLET, EXTENDED RELEASE ORAL
Qty: 90 TABLET | Refills: 0 | Status: SHIPPED | OUTPATIENT
Start: 2023-12-18 | End: 2024-03-18 | Stop reason: SDUPTHER

## 2023-12-18 ASSESSMENT — PATIENT HEALTH QUESTIONNAIRE - PHQ9
1. LITTLE INTEREST OR PLEASURE IN DOING THINGS: NOT AT ALL
SUM OF ALL RESPONSES TO PHQ9 QUESTIONS 1 AND 2: 0
2. FEELING DOWN, DEPRESSED OR HOPELESS: NOT AT ALL

## 2023-12-18 NOTE — PROGRESS NOTES
Subjective   Patient ID: Yuri Reynolds is a 63 y.o. male who presents for Follow-up.    HPI       No new concerns       Patient has hypertension.  Pt does not monitor BP readings at home.   He denies CP, SOB, and LE edema.   Patient is compliant with his antihypertensive therapy and denies any noted side effects.  He follows up with a cardiologist.      He has hyperlipidemia.  Pt does try to limit the amount of fatty foods and high cholesterol foods that are consumed.  He is compliant with the dosing of his statin therapy and denies any noted side effects.     Patient has DM type 2.   Patient does try to limit the amount of carbohydrates that are consumed.  Denies any hypoglycemic symptoms or readings.  He continues to tolerate the medications well.  He states that he does regularly check his blood sugar.      Patient has coronary artery disease and follows with cardiology as well.  He denies any chest pain, exertional shortness of breath, or limitation in his exertional activities.     He has cirrhosis of the liver.  Patient has continued to avoid alcohol.  He was previously evaluated by hepatology, Dr. Hartmann, but has not followed up since October 2019.  He denies any jaundice or changes in stool/urine color.  Patient also denies any melena or hematochezia.     He has had some thrombocytopenia.  He denies any abnormal bruising or bleeding at this time.     Review of Systems  Constitutional: Patient denies any fever, chills, loss of appetite, or unexplained weight loss.  Cardiovascular: Patient denies any chest pain, shortness of breath with exertion, tachycardia, palpitations, orthopnea, or paroxysmal nocturnal dyspnea.  Respiratory: Patient denies any cough, shortness breath, or wheezing.  Skin: Denies any rashes or skin lesions.   Neurology: Patient denies any new motor or sensory losses.  Denies any numbness, tingling, weakness, and incoordination of the extremities.  Patient also denies any tremor, seizures,  or gait instability.  Endocrinology: Denies any polyuria, polydipsia, polyphagia, or heat/cold intolerance.      Objective   /64   Pulse 81   Temp 36.5 °C (97.7 °F)   Ht 1.829 m (6')   Wt 109 kg (240 lb 11.2 oz)   SpO2 96%   BMI 32.64 kg/m²     Physical Exam  General Appearance: Alert and cooperative, in no acute distress, well-developed/well-nourished.  Neck: Supple and without adenopathy or rigidity.  There is no JVD at 90° and no carotid bruits are noted.  There is no thyromegaly, thyroid tenderness, or palpable thyroid nodules.  Heart: Regular rate and rhythm without murmur or ectopy.  Respiratory: Lungs are clear to auscultation bilaterally with good air exchange.  Good respiratory effort and no accessory muscle use.  Skin: Good turgor, moist, warm and without rashes or lesions.  Neurological exam: Alert and oriented ×3, no tremor, normal gait.  Extremities: No clubbing, cyanosis, or edema      Assessment/Plan     HTN: Blood pressure appears adequately controlled and we will continue with the current antihypertensive therapy.     Hyperlipidemia: Stable based on last labs.   Dietary changes, exercise, and maintenance of healthy weight were discussed at length.     Diabetes mellitus type 2:   His most recent labs reveal hemoglobin A1c was:  Lab Results   Component Value Date    HGBA1C 5.5 12/06/2023   Patient was advised to have a diabetic eye exam annually.  Patient was also advised to check the feet on a regular basis.     CAD: Stable based on symptoms.   He is without any worrisome signs or symptoms for unstable angina.  Risk factor modification was discussed.  He will continue follow-up with cardiology as well.     Cirrhosis / Hx of alcohol abuse: He has continued to abstain from alcohol.  He has delayed follow up with hepatology and has not scheduled the Fibroscan ordered by hepatology.  12/20/2022: Pt was again encouraged to schedule follow up and testing.     Thrombocytopenia: Stable based on  most recent lab evaluation.   We will continue to monitor.  Denies any abnormal bruising or bleeding.  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    Portal hypertension:  Secondary to cirrhosis.  Stable based on symptoms at this time.     Obesity: Dietary changes, exercise, and maintenance of a healthy weight were discussed at length.  Goal is to achieve a BMI less than 25.       PSA due 12/7/2024  COLONOSCOPY DUE 3/7/2022         Orders Placed This Encounter   Procedures    Basic Metabolic Panel    Hemoglobin A1C    Albumin , Urine Random     Requested Prescriptions     Signed Prescriptions Disp Refills    metFORMIN  mg 24 hr tablet 90 tablet 0     Sig: Take 1 tablet (500 mg) by mouth once daily in the evening. Take with meals.

## 2023-12-18 NOTE — PATIENT INSTRUCTIONS
Follow up in 3 months with labs to be done PRIOR.    It was a pleasure to see you today. Thank you for choosing us for your health care needs.    If you have lab or other testing completed and have not been informed of results within one week, please call the office for your results.    If you haven't done so, consider signing up for [facility] [email or web address], the [facility] personal health record. Ask the staff how you can get started.

## 2024-03-14 ENCOUNTER — LAB (OUTPATIENT)
Dept: LAB | Facility: LAB | Age: 64
End: 2024-03-14
Payer: COMMERCIAL

## 2024-03-14 DIAGNOSIS — I10 PRIMARY HYPERTENSION: ICD-10-CM

## 2024-03-14 DIAGNOSIS — E11.9 TYPE 2 DIABETES MELLITUS WITHOUT COMPLICATION, WITHOUT LONG-TERM CURRENT USE OF INSULIN (MULTI): ICD-10-CM

## 2024-03-14 LAB
ANION GAP SERPL CALC-SCNC: 11 MMOL/L (ref 10–20)
BUN SERPL-MCNC: 16 MG/DL (ref 6–23)
CALCIUM SERPL-MCNC: 9.3 MG/DL (ref 8.6–10.3)
CHLORIDE SERPL-SCNC: 105 MMOL/L (ref 98–107)
CO2 SERPL-SCNC: 24 MMOL/L (ref 21–32)
CREAT SERPL-MCNC: 0.85 MG/DL (ref 0.5–1.3)
CREAT UR-MCNC: 193.7 MG/DL (ref 20–370)
EGFRCR SERPLBLD CKD-EPI 2021: >90 ML/MIN/1.73M*2
EST. AVERAGE GLUCOSE BLD GHB EST-MCNC: 114 MG/DL
GLUCOSE SERPL-MCNC: 125 MG/DL (ref 74–99)
HBA1C MFR BLD: 5.6 %
MICROALBUMIN UR-MCNC: 13.8 MG/L
MICROALBUMIN/CREAT UR: 7.1 UG/MG CREAT
POTASSIUM SERPL-SCNC: 4.2 MMOL/L (ref 3.5–5.3)
SODIUM SERPL-SCNC: 136 MMOL/L (ref 136–145)

## 2024-03-14 PROCEDURE — 83036 HEMOGLOBIN GLYCOSYLATED A1C: CPT

## 2024-03-14 PROCEDURE — 80048 BASIC METABOLIC PNL TOTAL CA: CPT

## 2024-03-14 PROCEDURE — 82043 UR ALBUMIN QUANTITATIVE: CPT

## 2024-03-14 PROCEDURE — 36415 COLL VENOUS BLD VENIPUNCTURE: CPT

## 2024-03-14 PROCEDURE — 82570 ASSAY OF URINE CREATININE: CPT

## 2024-03-18 ENCOUNTER — OFFICE VISIT (OUTPATIENT)
Dept: PRIMARY CARE | Facility: CLINIC | Age: 64
End: 2024-03-18
Payer: COMMERCIAL

## 2024-03-18 VITALS
WEIGHT: 245.3 LBS | TEMPERATURE: 98 F | HEIGHT: 72 IN | SYSTOLIC BLOOD PRESSURE: 128 MMHG | OXYGEN SATURATION: 96 % | HEART RATE: 72 BPM | DIASTOLIC BLOOD PRESSURE: 75 MMHG | BODY MASS INDEX: 33.23 KG/M2

## 2024-03-18 DIAGNOSIS — D69.6 THROMBOCYTOPENIA (CMS-HCC): ICD-10-CM

## 2024-03-18 DIAGNOSIS — K76.6 PORTAL HYPERTENSION (MULTI): ICD-10-CM

## 2024-03-18 DIAGNOSIS — E66.9 CLASS 1 OBESITY WITH SERIOUS COMORBIDITY AND BODY MASS INDEX (BMI) OF 33.0 TO 33.9 IN ADULT, UNSPECIFIED OBESITY TYPE: ICD-10-CM

## 2024-03-18 DIAGNOSIS — I10 PRIMARY HYPERTENSION: Primary | ICD-10-CM

## 2024-03-18 DIAGNOSIS — N52.9 ERECTILE DYSFUNCTION, UNSPECIFIED ERECTILE DYSFUNCTION TYPE: ICD-10-CM

## 2024-03-18 DIAGNOSIS — K70.30 ALCOHOLIC CIRRHOSIS OF LIVER WITHOUT ASCITES (MULTI): ICD-10-CM

## 2024-03-18 DIAGNOSIS — M79.644 PAIN OF FINGER OF RIGHT HAND: ICD-10-CM

## 2024-03-18 DIAGNOSIS — L30.9 DERMATITIS: ICD-10-CM

## 2024-03-18 DIAGNOSIS — I25.10 ATHEROSCLEROSIS OF NATIVE CORONARY ARTERY OF NATIVE HEART WITHOUT ANGINA PECTORIS: ICD-10-CM

## 2024-03-18 DIAGNOSIS — E11.9 TYPE 2 DIABETES MELLITUS WITHOUT COMPLICATION, WITHOUT LONG-TERM CURRENT USE OF INSULIN (MULTI): ICD-10-CM

## 2024-03-18 DIAGNOSIS — E78.2 MIXED HYPERLIPIDEMIA: ICD-10-CM

## 2024-03-18 PROCEDURE — 3061F NEG MICROALBUMINURIA REV: CPT | Performed by: FAMILY MEDICINE

## 2024-03-18 PROCEDURE — 99214 OFFICE O/P EST MOD 30 MIN: CPT | Performed by: FAMILY MEDICINE

## 2024-03-18 PROCEDURE — 3044F HG A1C LEVEL LT 7.0%: CPT | Performed by: FAMILY MEDICINE

## 2024-03-18 PROCEDURE — 4010F ACE/ARB THERAPY RXD/TAKEN: CPT | Performed by: FAMILY MEDICINE

## 2024-03-18 PROCEDURE — 3074F SYST BP LT 130 MM HG: CPT | Performed by: FAMILY MEDICINE

## 2024-03-18 PROCEDURE — 3078F DIAST BP <80 MM HG: CPT | Performed by: FAMILY MEDICINE

## 2024-03-18 PROCEDURE — 3008F BODY MASS INDEX DOCD: CPT | Performed by: FAMILY MEDICINE

## 2024-03-18 RX ORDER — TRIAMCINOLONE ACETONIDE 1 MG/G
1 CREAM TOPICAL 2 TIMES DAILY PRN
Qty: 45 G | Refills: 0 | Status: SHIPPED | OUTPATIENT
Start: 2024-03-18

## 2024-03-18 RX ORDER — METFORMIN HYDROCHLORIDE 500 MG/1
500 TABLET, EXTENDED RELEASE ORAL
Qty: 90 TABLET | Refills: 0 | Status: SHIPPED | OUTPATIENT
Start: 2024-03-18

## 2024-03-18 RX ORDER — SILDENAFIL 100 MG/1
TABLET, FILM COATED ORAL
Qty: 20 TABLET | Refills: 0 | Status: SHIPPED | OUTPATIENT
Start: 2024-03-18

## 2024-03-18 NOTE — PATIENT INSTRUCTIONS
Follow up in 3 months with labs to be done PRIOR.    It was a pleasure to see you today. Thank you for choosing us for your health care needs.    If you have lab or other testing completed and have not been informed of results within one week, please call the office for your results.    If you haven't done so, consider signing up for [facility] [employer], the [facility] personal health record. Ask the staff how you can get started.

## 2024-03-18 NOTE — PROGRESS NOTES
Subjective   Patient ID: Yuri Reynolds is a 63 y.o. male who presents for Follow-up.    HPI     He is complaining of right index finger pain to the lateral aspect of the IP joint.  No injury.  No swelling.    Complaining of difficulty achieving an erection for years.       Patient has hypertension.  Pt does not monitor BP readings at home.   He denies CP, SOB, and LE edema.   Patient is compliant with his antihypertensive therapy and denies any noted side effects.  He follows up with a cardiologist.      He has hyperlipidemia.  Pt does try to limit the amount of fatty foods and high cholesterol foods that are consumed.  He is compliant with the dosing of his statin therapy and denies any noted side effects.     Patient has DM type 2.   Patient does try to limit the amount of carbohydrates that are consumed.  Denies any hypoglycemic symptoms or readings.  He continues to tolerate the medications well.  He states that he does regularly check his blood sugar.      Patient has coronary artery disease and follows with cardiology as well.  He denies any chest pain, exertional shortness of breath, or limitation in his exertional activities.     He has cirrhosis of the liver.  Patient has continued to avoid alcohol.  He was previously evaluated by hepatology, Dr. Hartmann, but has not followed up since October 2019.  He denies any jaundice or changes in stool/urine color.  Patient also denies any melena or hematochezia.     He has had some thrombocytopenia.  He denies any abnormal bruising or bleeding at this time.     Review of Systems  Constitutional: Patient denies any fever, chills, loss of appetite, or unexplained weight loss.  Cardiovascular: Patient denies any chest pain, shortness of breath with exertion, tachycardia, palpitations, orthopnea, or paroxysmal nocturnal dyspnea.  Respiratory: Patient denies any cough, shortness breath, or wheezing.  Gastrointestinal: Patient denies any nausea, vomiting, diarrhea,  constipation, melena, hematochezia, or reflux symptoms.  Skin: Denies any rashes or skin lesions.   Neurology: Patient denies any new motor or sensory losses.  Denies any numbness, tingling, weakness, and incoordination of the extremities.  Patient also denies any tremor, seizures, or gait instability.  Endocrinology: Denies any polyuria, polydipsia, polyphagia, or heat/cold intolerance.      Objective   /75   Pulse 72   Temp 36.7 °C (98 °F)   Ht 1.829 m (6')   Wt 111 kg (245 lb 4.8 oz)   SpO2 96%   BMI 33.27 kg/m²     Physical Exam  General Appearance: Alert and cooperative, in no acute distress, well-developed/well-nourished.  Neck: Supple and without adenopathy or rigidity.  There is no JVD at 90° and no carotid bruits are noted.  There is no thyromegaly, thyroid tenderness, or palpable thyroid nodules.  Heart: Regular rate and rhythm without murmur or ectopy.  Respiratory: Lungs are clear to auscultation bilaterally with good air exchange.  Good respiratory effort and no accessory muscle use.  Skin: Good turgor, moist, warm and without rashes or lesions.  Neurological exam: Alert and oriented ×3, no tremor, normal gait.  Extremities: No clubbing, cyanosis, or edema    MS:  Examination of the right index finger reveals tenderness to palp of the lateral aspect of the IP joint.  No redness, swelling, or deformity.      Assessment/Plan     RIGHT index finger pain:  Exam is normal except for tenderness to the lateral aspect of the IP joint.  Recommended referral to ortho hand specialist but he has declined.    ED: We will give him a trial of viagra.   Risks, benefits, and side effects of the medication were discussed at length.  Questions were answered to the patient's satisfaction the patient wishes to proceed with treatment.        HTN: Blood pressure appears adequately controlled and we will continue with the current antihypertensive therapy.     Hyperlipidemia: Stable based on last labs.   Dietary  changes, exercise, and maintenance of healthy weight were discussed at length.     Diabetes mellitus type 2:   His most recent labs reveal hemoglobin A1c was:  Lab Results   Component Value Date    HGBA1C 5.6 03/14/2024   Patient was advised to have a diabetic eye exam annually.  Patient was also advised to check the feet on a regular basis.     CAD: Stable based on symptoms.   He is without any worrisome signs or symptoms for unstable angina.  Risk factor modification was discussed.  He will continue follow-up with cardiology as well.     Cirrhosis / Hx of alcohol abuse:   He has continued to abstain from alcohol.  He has delayed follow up with hepatology and has not scheduled the Fibroscan ordered by hepatology.  3/18/2024: Pt encouraged to schedule follow up and testing per hepatology.     Thrombocytopenia: Stable based on most recent lab evaluation.   We will continue to monitor.  Denies any abnormal bruising or bleeding.  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     Portal hypertension:  Secondary to cirrhosis.  Stable based on symptoms at this time.     Obesity: Dietary changes, exercise, and maintenance of a healthy weight were discussed at length.  Goal is to achieve a BMI less than 25.     Dermatitis:  Stable on as needed topical triamcinolone.  Continue current plan.     PSA due 12/7/2024  COLONOSCOPY DUE 3/7/2022            Orders Placed This Encounter   Procedures    Hemoglobin A1C    Comprehensive Metabolic Panel    Lipid Panel     Requested Prescriptions     Signed Prescriptions Disp Refills    metFORMIN  mg 24 hr tablet 90 tablet 0     Sig: Take 1 tablet (500 mg) by mouth once daily in the evening. Take with meals.    sildenafil (Viagra) 100 mg tablet 20 tablet 0     Sig: Take 1 tablet 30-60 min before intercourse.    triamcinolone (Kenalog) 0.1 % cream 45 g 0     Sig: Apply 1 Application topically 2 times a day as needed (cisco  of dermatitis). Monday - Friday only

## 2024-04-09 ENCOUNTER — TELEPHONE (OUTPATIENT)
Dept: CARDIOLOGY | Facility: CLINIC | Age: 64
End: 2024-04-09
Payer: COMMERCIAL

## 2024-04-09 NOTE — TELEPHONE ENCOUNTER
The patient called into the office stating that he is not feeling very good. He has been getting lightheaded and confused very easily. The patient stated he would like some tests done to see what is going on. Patient was transferred to [facility] to schedule an appointment to be soon in the office. Patient was advised if symptoms get worse to go to the emergency room. Chelsie KNIGHT

## 2024-04-10 ENCOUNTER — OFFICE VISIT (OUTPATIENT)
Dept: CARDIOLOGY | Facility: CLINIC | Age: 64
End: 2024-04-10
Payer: COMMERCIAL

## 2024-04-10 VITALS
HEIGHT: 72 IN | HEART RATE: 74 BPM | BODY MASS INDEX: 32.51 KG/M2 | WEIGHT: 240 LBS | DIASTOLIC BLOOD PRESSURE: 62 MMHG | SYSTOLIC BLOOD PRESSURE: 122 MMHG

## 2024-04-10 DIAGNOSIS — Z98.61 HISTORY OF PTCA: ICD-10-CM

## 2024-04-10 DIAGNOSIS — I25.10 ATHEROSCLEROSIS OF NATIVE CORONARY ARTERY OF NATIVE HEART WITHOUT ANGINA PECTORIS: Primary | ICD-10-CM

## 2024-04-10 DIAGNOSIS — E78.2 MIXED HYPERLIPIDEMIA: ICD-10-CM

## 2024-04-10 DIAGNOSIS — I25.2 PAST MYOCARDIAL INFARCTION: ICD-10-CM

## 2024-04-10 DIAGNOSIS — I10 PRIMARY HYPERTENSION: ICD-10-CM

## 2024-04-10 PROCEDURE — 4010F ACE/ARB THERAPY RXD/TAKEN: CPT | Performed by: NURSE PRACTITIONER

## 2024-04-10 PROCEDURE — 3044F HG A1C LEVEL LT 7.0%: CPT | Performed by: NURSE PRACTITIONER

## 2024-04-10 PROCEDURE — 3074F SYST BP LT 130 MM HG: CPT | Performed by: NURSE PRACTITIONER

## 2024-04-10 PROCEDURE — 3078F DIAST BP <80 MM HG: CPT | Performed by: NURSE PRACTITIONER

## 2024-04-10 PROCEDURE — 3008F BODY MASS INDEX DOCD: CPT | Performed by: NURSE PRACTITIONER

## 2024-04-10 PROCEDURE — 99214 OFFICE O/P EST MOD 30 MIN: CPT | Performed by: NURSE PRACTITIONER

## 2024-04-10 PROCEDURE — 3061F NEG MICROALBUMINURIA REV: CPT | Performed by: NURSE PRACTITIONER

## 2024-04-10 PROCEDURE — 93000 ELECTROCARDIOGRAM COMPLETE: CPT | Performed by: NURSE PRACTITIONER

## 2024-04-10 NOTE — PROGRESS NOTES
Yuri Reynolds is a 64 y.o. male that presents to the office today for cardiac evaluation.  He follows with his  primary cardiologist Dr. Birmingham and was added to my schedule today.      Per Dr. Birmingham office notes, he has a history of atherosclerotic heart disease with remote inferior wall myocardial infarction 1999. He had an apical myocardial infarction in August 2003. He underwent angioplasty and stenting of the right coronary artery in 1999 and in 2003. A myocardial perfusion study in April 2015 was negative for ischemia with calculated LV ejection fraction of 65%. He has a history of essential hypertension, type 2 diabetes mellitus, and hyperlipidemia. He stopped smoking tobacco back in January 2017.      He previously noted some intermittent palpitations and a Holter monitor showed normal sinus rhythm with occasional PACs and PVCs. His symptoms correlated with the ectopy. He also had some exertional shortness breath in the setting of progressive weight gain. Extensive pulmonary evaluation was unremarkable. An echocardiogram in August 2017 showed normal LV systolic function and normal valvular structure and function. He was treated in 2019 for a bowel obstruction. He was managed conservatively and it resolved. In the process he was diagnosed with cirrhosis of the liver. He was drinking quite heavily at the time but has completely stopped using any alcohol. He also stopped smoking.     He presents to the office today with reports of worsening exertional shortness of breath that is associated with extreme fatigue, clamminess and diaphoresis. He states that his shortness of breath does improve with rest.  He states that he did some light yard work recently and had to stop due to fatigue, shortness of breath and diaphoresis.   He denies any chest pain, chest pressure or chest tightness.  Denies lower extremity edema.     He verbalizes understanding that if his symptoms worsen he is to seek emergency medical  treatment.       Testing Reviewed  EKG obtained in the office today and verified with Dr. Martinez  shows NSR, possible inferior infarct age undetermined. No acute changes.  HR 76  bpm, QT/Qtc 364/409    3/14/2024 labs; , K4.2, BUN 16, creatinine 0.85, HgbA1c 5.6.      Assessment/Plan  Shortness of breath: Described as worsening exertional shortness of breath for approximately 2 months. Will obtain echocardiogram to assess valve and pump function.   CAD:  History of CAD with PCI/stent RCA 1999, 2003.  He states that his symptoms are very similar to when he had his previous MI.  Last stress test 2015.  Will obtain Nuclear Lexiscan stress test to assess for progression of CAD.  He states that he is unable to ambulate on treadmill due to his shortness of breath.   Follow in office after testing for results and further recommendations.       Patient Active Problem List   Diagnosis    Abnormal LFTs    Atherosclerosis of native coronary artery without angina pectoris    Bilateral sensorineural hearing loss    Chronic nasal congestion    Cirrhosis (CMS/HCC)    Cough    Dizziness    Elevated serum creatinine    Eustachian tube dysfunction    Fatty liver    Hearing loss, bilateral    History of PTCA    Hyperlipidemia    Hypersplenism    Hypertension    Inability to maintain erection    Insomnia    Memory changes    Nicotine dependence    Palpitations    Past myocardial infarction    Retained myringotomy tube    Small bowel obstruction (CMS/HCC)    Somnolence, daytime    Thrombocytopenia (CMS/HCC)    Type 2 diabetes mellitus without complication, without long-term current use of insulin (CMS/HCC)    History of alcohol abuse    Class 1 obesity with serious comorbidity and body mass index (BMI) of 33.0 to 33.9 in adult    Abnormal finding in urine    Alcohol abuse    Anal discharge    Bursitis of elbow    Difficulty breathing    Dysfunction of both eustachian tubes    History of cardiac catheterization    History of ear  disorder    History of pulmonary embolism    Inflammatory dermatosis    Lateral epicondylitis of both elbows    Portal hypertension (CMS/HCC)    Tinnitus, right ear       Social History     Tobacco Use    Smoking status: Former     Current packs/day: 0.00     Types: Cigarettes     Quit date:      Years since quittin.2    Smokeless tobacco: Never   Substance Use Topics    Alcohol use: Not Currently    Drug use: Never       Past Medical History:   Diagnosis Date    Alcohol abuse, uncomplicated 2022    Alcohol abuse    Old myocardial infarction     History of myocardial infarction    Other specified postprocedural states     History of cardiac cath    Personal history of other diseases of the nervous system and sense organs     History of eustachian tube dysfunction    Personal history of pulmonary embolism     History of pulmonary embolism         Current Outpatient Medications:     aspirin 81 mg EC tablet, Take 1 tablet (81 mg) by mouth once daily., Disp: , Rfl:     lisinopril 10 mg tablet, Take 1 tablet (10 mg) by mouth once daily., Disp: 90 tablet, Rfl: 3    metFORMIN  mg 24 hr tablet, Take 1 tablet (500 mg) by mouth once daily in the evening. Take with meals., Disp: 90 tablet, Rfl: 0    metoprolol succinate XL (Toprol-XL) 50 mg 24 hr tablet, Take 1 tablet (50 mg) by mouth once daily., Disp: 90 tablet, Rfl: 3    rosuvastatin (Crestor) 40 mg tablet, Take 1 tablet (40 mg) by mouth once daily., Disp: 90 tablet, Rfl: 3    sildenafil (Viagra) 100 mg tablet, Take 1 tablet 30-60 min before intercourse., Disp: 20 tablet, Rfl: 0    triamcinolone (Kenalog) 0.1 % cream, Apply 1 Application topically 2 times a day as needed (hisotry of dermatitis). Monday - Friday only, Disp: 45 g, Rfl: 0    Patient has no known allergies.    Family History   Problem Relation Name Age of Onset    Diabetes Mother      Other (Other) Father          Arteriosclerotic cardiovascular disease; cardiac disorder    Heart attack  Father      Diabetes Sister      Other (Other) Other Grandfather         cardiac disorder       Past Surgical History:   Procedure Laterality Date    OTHER SURGICAL HISTORY  10/20/2021    Percutaneous transluminal coronary angioplasty          Review of systems  Constitutional: No weight loss, fever, chills, weakness. Positive for fatigue  HEENT: No visual loss, blurred vision, double vision or yellow sclerae  Skin: No rash or itching  Cardiovascular: No chest pain, pressure or discomfort, No palpitations or edema.  Respiratory: Positive for exertional  shortness of breath, denies cough or sputum  Gastrointestinal: No nausea, vomiting or diarrhea. No bloody or dark tarry stools.  Neurological: No headache, lightheadedness, dizziness, syncope.   Musculoskeletal: No muscle, back pain, joint pain or stiffness.  Hematologic: No anemia, bleeding or bruising.    /62 (BP Location: Left arm, Patient Position: Sitting)   Pulse 74   Ht 1.829 m (6')   Wt 109 kg (240 lb)   BMI 32.55 kg/m²     Patient Active Problem List   Diagnosis    Abnormal LFTs    Atherosclerosis of native coronary artery without angina pectoris    Bilateral sensorineural hearing loss    Chronic nasal congestion    Cirrhosis (CMS/HCC)    Cough    Dizziness    Elevated serum creatinine    Eustachian tube dysfunction    Fatty liver    Hearing loss, bilateral    History of PTCA    Hyperlipidemia    Hypersplenism    Hypertension    Inability to maintain erection    Insomnia    Memory changes    Nicotine dependence    Palpitations    Past myocardial infarction    Retained myringotomy tube    Small bowel obstruction (CMS/HCC)    Somnolence, daytime    Thrombocytopenia (CMS/HCC)    Type 2 diabetes mellitus without complication, without long-term current use of insulin (CMS/HCC)    History of alcohol abuse    Class 1 obesity with serious comorbidity and body mass index (BMI) of 33.0 to 33.9 in adult    Abnormal finding in urine    Alcohol abuse    Anal  discharge    Bursitis of elbow    Difficulty breathing    Dysfunction of both eustachian tubes    History of cardiac catheterization    History of ear disorder    History of pulmonary embolism    Inflammatory dermatosis    Lateral epicondylitis of both elbows    Portal hypertension (CMS/HCC)    Tinnitus, right ear         Physical Exam  Constitutional: Well developed, awake/alert x 3, no distress.  Head/Neck: No JVD, No bruits  Respiratory/Thorax: patent airways, CTAB, normal breath sounds with good expansion.  Cardiovascular: Regular rate and rhythm, no murmurs, normal S1 and S2,   Gastrointestinal: Non distended, soft, non-tender, no rebound tenderness or guarding.  Extremities: No cyanosis, edema.    Neurological: Alert and oriented x 3. Moves extremities spontaneous with purpose.  Psychological: Appropriate mood and behavior  Skin: Warm and Dry. No lesions or rashes.         Please excuse any errors in grammar or translation related to dictation, voice recognition software was used to prepare this document.

## 2024-04-12 DIAGNOSIS — R06.02 SHORTNESS OF BREATH: ICD-10-CM

## 2024-04-12 DIAGNOSIS — Z98.61 HISTORY OF PTCA: ICD-10-CM

## 2024-04-12 DIAGNOSIS — I25.10 ATHEROSCLEROSIS OF NATIVE CORONARY ARTERY OF NATIVE HEART WITHOUT ANGINA PECTORIS: ICD-10-CM

## 2024-04-12 DIAGNOSIS — I25.10 ATHEROSCLEROSIS OF NATIVE CORONARY ARTERY OF NATIVE HEART WITHOUT ANGINA PECTORIS: Primary | ICD-10-CM

## 2024-04-12 RX ORDER — ISOSORBIDE MONONITRATE 30 MG/1
30 TABLET, EXTENDED RELEASE ORAL DAILY
Qty: 30 TABLET | Refills: 3 | Status: SHIPPED | OUTPATIENT
Start: 2024-04-12 | End: 2024-04-12

## 2024-04-15 RX ORDER — ISOSORBIDE MONONITRATE 30 MG/1
30 TABLET, EXTENDED RELEASE ORAL DAILY
Qty: 30 TABLET | Refills: 3 | OUTPATIENT
Start: 2024-04-15

## 2024-05-14 ENCOUNTER — APPOINTMENT (OUTPATIENT)
Dept: CARDIOLOGY | Facility: HOSPITAL | Age: 64
End: 2024-05-14
Payer: COMMERCIAL

## 2024-05-17 ENCOUNTER — APPOINTMENT (OUTPATIENT)
Dept: CARDIOLOGY | Facility: CLINIC | Age: 64
End: 2024-05-17
Payer: COMMERCIAL

## 2024-06-18 ENCOUNTER — APPOINTMENT (OUTPATIENT)
Dept: PRIMARY CARE | Facility: CLINIC | Age: 64
End: 2024-06-18
Payer: COMMERCIAL

## 2024-06-20 ENCOUNTER — APPOINTMENT (OUTPATIENT)
Dept: CARDIOLOGY | Facility: HOSPITAL | Age: 64
End: 2024-06-20
Payer: COMMERCIAL

## 2024-07-01 ENCOUNTER — APPOINTMENT (OUTPATIENT)
Dept: CARDIOLOGY | Facility: CLINIC | Age: 64
End: 2024-07-01
Payer: COMMERCIAL

## 2024-07-02 DIAGNOSIS — E11.9 TYPE 2 DIABETES MELLITUS WITHOUT COMPLICATION, WITHOUT LONG-TERM CURRENT USE OF INSULIN (MULTI): ICD-10-CM

## 2024-07-06 DIAGNOSIS — E11.9 TYPE 2 DIABETES MELLITUS WITHOUT COMPLICATION, WITHOUT LONG-TERM CURRENT USE OF INSULIN (MULTI): ICD-10-CM

## 2024-07-08 RX ORDER — METFORMIN HYDROCHLORIDE 500 MG/1
TABLET, EXTENDED RELEASE ORAL
Qty: 90 TABLET | Refills: 0 | OUTPATIENT
Start: 2024-07-08

## 2024-07-08 RX ORDER — METFORMIN HYDROCHLORIDE 500 MG/1
500 TABLET, EXTENDED RELEASE ORAL
Qty: 90 TABLET | Refills: 0 | Status: SHIPPED | OUTPATIENT
Start: 2024-07-08

## 2024-07-21 ENCOUNTER — HOSPITAL ENCOUNTER (OUTPATIENT)
Facility: HOSPITAL | Age: 64
Setting detail: OBSERVATION
Discharge: HOME | End: 2024-07-22
Attending: STUDENT IN AN ORGANIZED HEALTH CARE EDUCATION/TRAINING PROGRAM | Admitting: STUDENT IN AN ORGANIZED HEALTH CARE EDUCATION/TRAINING PROGRAM
Payer: COMMERCIAL

## 2024-07-21 ENCOUNTER — APPOINTMENT (OUTPATIENT)
Dept: RADIOLOGY | Facility: HOSPITAL | Age: 64
End: 2024-07-21
Payer: COMMERCIAL

## 2024-07-21 ENCOUNTER — APPOINTMENT (OUTPATIENT)
Dept: CARDIOLOGY | Facility: HOSPITAL | Age: 64
End: 2024-07-21
Payer: COMMERCIAL

## 2024-07-21 DIAGNOSIS — I26.99 PULMONARY EMBOLISM WITHOUT ACUTE COR PULMONALE, UNSPECIFIED CHRONICITY, UNSPECIFIED PULMONARY EMBOLISM TYPE (MULTI): ICD-10-CM

## 2024-07-21 DIAGNOSIS — R07.89 OTHER CHEST PAIN: ICD-10-CM

## 2024-07-21 DIAGNOSIS — I26.99 OTHER ACUTE PULMONARY EMBOLISM WITHOUT ACUTE COR PULMONALE (MULTI): Primary | ICD-10-CM

## 2024-07-21 LAB
ALBUMIN SERPL BCP-MCNC: 4.8 G/DL (ref 3.4–5)
ALP SERPL-CCNC: 82 U/L (ref 33–136)
ALT SERPL W P-5'-P-CCNC: 29 U/L (ref 10–52)
ANION GAP SERPL CALC-SCNC: 13 MMOL/L (ref 10–20)
AST SERPL W P-5'-P-CCNC: 26 U/L (ref 9–39)
BASOPHILS # BLD AUTO: 0.02 X10*3/UL (ref 0–0.1)
BASOPHILS NFR BLD AUTO: 0.3 %
BILIRUB SERPL-MCNC: 1 MG/DL (ref 0–1.2)
BNP SERPL-MCNC: 18 PG/ML (ref 0–99)
BUN SERPL-MCNC: 16 MG/DL (ref 6–23)
CALCIUM SERPL-MCNC: 9.9 MG/DL (ref 8.6–10.3)
CARDIAC TROPONIN I PNL SERPL HS: 4 NG/L (ref 0–20)
CARDIAC TROPONIN I PNL SERPL HS: 4 NG/L (ref 0–20)
CHLORIDE SERPL-SCNC: 102 MMOL/L (ref 98–107)
CO2 SERPL-SCNC: 24 MMOL/L (ref 21–32)
CREAT SERPL-MCNC: 1.05 MG/DL (ref 0.5–1.3)
EGFRCR SERPLBLD CKD-EPI 2021: 80 ML/MIN/1.73M*2
EOSINOPHIL # BLD AUTO: 0.06 X10*3/UL (ref 0–0.7)
EOSINOPHIL NFR BLD AUTO: 1 %
ERYTHROCYTE [DISTWIDTH] IN BLOOD BY AUTOMATED COUNT: 13.1 % (ref 11.5–14.5)
GLUCOSE SERPL-MCNC: 95 MG/DL (ref 74–99)
HCT VFR BLD AUTO: 45.5 % (ref 41–52)
HGB BLD-MCNC: 15.6 G/DL (ref 13.5–17.5)
HOLD SPECIMEN: NORMAL
IMM GRANULOCYTES # BLD AUTO: 0.02 X10*3/UL (ref 0–0.7)
IMM GRANULOCYTES NFR BLD AUTO: 0.3 % (ref 0–0.9)
LIPASE SERPL-CCNC: 59 U/L (ref 9–82)
LYMPHOCYTES # BLD AUTO: 1.6 X10*3/UL (ref 1.2–4.8)
LYMPHOCYTES NFR BLD AUTO: 26 %
MCH RBC QN AUTO: 28.4 PG (ref 26–34)
MCHC RBC AUTO-ENTMCNC: 34.3 G/DL (ref 32–36)
MCV RBC AUTO: 83 FL (ref 80–100)
MONOCYTES # BLD AUTO: 0.42 X10*3/UL (ref 0.1–1)
MONOCYTES NFR BLD AUTO: 6.8 %
NEUTROPHILS # BLD AUTO: 4.03 X10*3/UL (ref 1.2–7.7)
NEUTROPHILS NFR BLD AUTO: 65.6 %
NRBC BLD-RTO: 0 /100 WBCS (ref 0–0)
PLATELET # BLD AUTO: 81 X10*3/UL (ref 150–450)
POTASSIUM SERPL-SCNC: 3.9 MMOL/L (ref 3.5–5.3)
PROT SERPL-MCNC: 7.5 G/DL (ref 6.4–8.2)
RBC # BLD AUTO: 5.49 X10*6/UL (ref 4.5–5.9)
SODIUM SERPL-SCNC: 135 MMOL/L (ref 136–145)
WBC # BLD AUTO: 6.2 X10*3/UL (ref 4.4–11.3)

## 2024-07-21 PROCEDURE — 71275 CT ANGIOGRAPHY CHEST: CPT

## 2024-07-21 PROCEDURE — 93005 ELECTROCARDIOGRAM TRACING: CPT

## 2024-07-21 PROCEDURE — 84484 ASSAY OF TROPONIN QUANT: CPT | Performed by: STUDENT IN AN ORGANIZED HEALTH CARE EDUCATION/TRAINING PROGRAM

## 2024-07-21 PROCEDURE — 96376 TX/PRO/DX INJ SAME DRUG ADON: CPT | Mod: 59

## 2024-07-21 PROCEDURE — 36415 COLL VENOUS BLD VENIPUNCTURE: CPT | Performed by: STUDENT IN AN ORGANIZED HEALTH CARE EDUCATION/TRAINING PROGRAM

## 2024-07-21 PROCEDURE — 83880 ASSAY OF NATRIURETIC PEPTIDE: CPT | Performed by: STUDENT IN AN ORGANIZED HEALTH CARE EDUCATION/TRAINING PROGRAM

## 2024-07-21 PROCEDURE — 71045 X-RAY EXAM CHEST 1 VIEW: CPT

## 2024-07-21 PROCEDURE — 85025 COMPLETE CBC W/AUTO DIFF WBC: CPT | Performed by: STUDENT IN AN ORGANIZED HEALTH CARE EDUCATION/TRAINING PROGRAM

## 2024-07-21 PROCEDURE — 2500000001 HC RX 250 WO HCPCS SELF ADMINISTERED DRUGS (ALT 637 FOR MEDICARE OP): Performed by: STUDENT IN AN ORGANIZED HEALTH CARE EDUCATION/TRAINING PROGRAM

## 2024-07-21 PROCEDURE — 99291 CRITICAL CARE FIRST HOUR: CPT | Mod: 25 | Performed by: STUDENT IN AN ORGANIZED HEALTH CARE EDUCATION/TRAINING PROGRAM

## 2024-07-21 PROCEDURE — 83690 ASSAY OF LIPASE: CPT | Performed by: STUDENT IN AN ORGANIZED HEALTH CARE EDUCATION/TRAINING PROGRAM

## 2024-07-21 PROCEDURE — 71045 X-RAY EXAM CHEST 1 VIEW: CPT | Mod: FOREIGN READ | Performed by: RADIOLOGY

## 2024-07-21 PROCEDURE — 96375 TX/PRO/DX INJ NEW DRUG ADDON: CPT | Mod: 59

## 2024-07-21 PROCEDURE — 96365 THER/PROPH/DIAG IV INF INIT: CPT | Mod: 59

## 2024-07-21 PROCEDURE — 2550000001 HC RX 255 CONTRASTS: Performed by: STUDENT IN AN ORGANIZED HEALTH CARE EDUCATION/TRAINING PROGRAM

## 2024-07-21 PROCEDURE — 80053 COMPREHEN METABOLIC PANEL: CPT | Performed by: STUDENT IN AN ORGANIZED HEALTH CARE EDUCATION/TRAINING PROGRAM

## 2024-07-21 PROCEDURE — 71275 CT ANGIOGRAPHY CHEST: CPT | Mod: FOREIGN READ | Performed by: RADIOLOGY

## 2024-07-21 PROCEDURE — 2500000004 HC RX 250 GENERAL PHARMACY W/ HCPCS (ALT 636 FOR OP/ED): Performed by: STUDENT IN AN ORGANIZED HEALTH CARE EDUCATION/TRAINING PROGRAM

## 2024-07-21 RX ORDER — MORPHINE SULFATE 4 MG/ML
4 INJECTION, SOLUTION INTRAMUSCULAR; INTRAVENOUS ONCE
Status: COMPLETED | OUTPATIENT
Start: 2024-07-21 | End: 2024-07-21

## 2024-07-21 RX ORDER — HEPARIN SODIUM 10000 [USP'U]/100ML
0-4000 INJECTION, SOLUTION INTRAVENOUS CONTINUOUS
Status: DISCONTINUED | OUTPATIENT
Start: 2024-07-21 | End: 2024-07-21

## 2024-07-21 RX ORDER — HEPARIN SODIUM 10000 [USP'U]/100ML
0-4500 INJECTION, SOLUTION INTRAVENOUS CONTINUOUS
Status: DISCONTINUED | OUTPATIENT
Start: 2024-07-21 | End: 2024-07-22

## 2024-07-21 RX ORDER — ONDANSETRON HYDROCHLORIDE 2 MG/ML
4 INJECTION, SOLUTION INTRAVENOUS ONCE
Status: COMPLETED | OUTPATIENT
Start: 2024-07-21 | End: 2024-07-21

## 2024-07-21 RX ORDER — NAPROXEN SODIUM 220 MG/1
324 TABLET, FILM COATED ORAL ONCE
Status: COMPLETED | OUTPATIENT
Start: 2024-07-21 | End: 2024-07-21

## 2024-07-21 ASSESSMENT — PAIN SCALES - GENERAL
PAINLEVEL_OUTOF10: 1
PAINLEVEL_OUTOF10: 0 - NO PAIN
PAINLEVEL_OUTOF10: 5 - MODERATE PAIN
PAINLEVEL_OUTOF10: 1

## 2024-07-21 ASSESSMENT — COLUMBIA-SUICIDE SEVERITY RATING SCALE - C-SSRS
1. IN THE PAST MONTH, HAVE YOU WISHED YOU WERE DEAD OR WISHED YOU COULD GO TO SLEEP AND NOT WAKE UP?: NO
6. HAVE YOU EVER DONE ANYTHING, STARTED TO DO ANYTHING, OR PREPARED TO DO ANYTHING TO END YOUR LIFE?: NO
2. HAVE YOU ACTUALLY HAD ANY THOUGHTS OF KILLING YOURSELF?: NO

## 2024-07-21 ASSESSMENT — PAIN DESCRIPTION - DESCRIPTORS: DESCRIPTORS: PRESSURE

## 2024-07-21 ASSESSMENT — PAIN DESCRIPTION - PROGRESSION: CLINICAL_PROGRESSION: NOT CHANGED

## 2024-07-21 ASSESSMENT — PAIN DESCRIPTION - LOCATION: LOCATION: CHEST

## 2024-07-21 ASSESSMENT — PAIN - FUNCTIONAL ASSESSMENT
PAIN_FUNCTIONAL_ASSESSMENT: 0-10
PAIN_FUNCTIONAL_ASSESSMENT: 0-10

## 2024-07-22 ENCOUNTER — APPOINTMENT (OUTPATIENT)
Dept: CARDIOLOGY | Facility: HOSPITAL | Age: 64
End: 2024-07-22
Payer: COMMERCIAL

## 2024-07-22 ENCOUNTER — PHARMACY VISIT (OUTPATIENT)
Dept: PHARMACY | Facility: CLINIC | Age: 64
End: 2024-07-22
Payer: COMMERCIAL

## 2024-07-22 VITALS
HEART RATE: 68 BPM | TEMPERATURE: 98.1 F | DIASTOLIC BLOOD PRESSURE: 53 MMHG | HEIGHT: 72 IN | OXYGEN SATURATION: 96 % | WEIGHT: 238.54 LBS | BODY MASS INDEX: 32.31 KG/M2 | RESPIRATION RATE: 18 BRPM | SYSTOLIC BLOOD PRESSURE: 90 MMHG

## 2024-07-22 PROBLEM — I25.119 CORONARY ARTERY DISEASE INVOLVING NATIVE CORONARY ARTERY OF NATIVE HEART WITH ANGINA PECTORIS (CMS-HCC): Status: ACTIVE | Noted: 2023-02-04

## 2024-07-22 PROBLEM — R07.9 CHEST PAIN: Status: ACTIVE | Noted: 2024-07-22

## 2024-07-22 LAB
ANION GAP SERPL CALC-SCNC: 11 MMOL/L (ref 10–20)
AORTIC VALVE MEAN GRADIENT: 2 MMHG
AORTIC VALVE PEAK VELOCITY: 0.98 M/S
ATRIAL RATE: 72 BPM
ATRIAL RATE: 94 BPM
AV PEAK GRADIENT: 3.8 MMHG
AVA (PEAK VEL): 2.9 CM2
AVA (VTI): 2.88 CM2
BUN SERPL-MCNC: 16 MG/DL (ref 6–23)
CALCIUM SERPL-MCNC: 9.3 MG/DL (ref 8.6–10.3)
CHLORIDE SERPL-SCNC: 104 MMOL/L (ref 98–107)
CO2 SERPL-SCNC: 24 MMOL/L (ref 21–32)
CREAT SERPL-MCNC: 0.89 MG/DL (ref 0.5–1.3)
EGFRCR SERPLBLD CKD-EPI 2021: >90 ML/MIN/1.73M*2
EJECTION FRACTION APICAL 4 CHAMBER: 61.2
EJECTION FRACTION: 58 %
ERYTHROCYTE [DISTWIDTH] IN BLOOD BY AUTOMATED COUNT: 13.1 % (ref 11.5–14.5)
GLUCOSE BLD MANUAL STRIP-MCNC: 103 MG/DL (ref 74–99)
GLUCOSE BLD MANUAL STRIP-MCNC: 113 MG/DL (ref 74–99)
GLUCOSE BLD MANUAL STRIP-MCNC: 165 MG/DL (ref 74–99)
GLUCOSE SERPL-MCNC: 107 MG/DL (ref 74–99)
HCT VFR BLD AUTO: 41.6 % (ref 41–52)
HGB BLD-MCNC: 14.2 G/DL (ref 13.5–17.5)
HOLD SPECIMEN: NORMAL
LEFT ATRIUM VOLUME AREA LENGTH INDEX BSA: 12.3 ML/M2
LEFT VENTRICLE INTERNAL DIMENSION DIASTOLE: 4 CM (ref 3.5–6)
LEFT VENTRICULAR OUTFLOW TRACT DIAMETER: 2 CM
LV EJECTION FRACTION BIPLANE: 58 %
MCH RBC QN AUTO: 28.3 PG (ref 26–34)
MCHC RBC AUTO-ENTMCNC: 34.1 G/DL (ref 32–36)
MCV RBC AUTO: 83 FL (ref 80–100)
MITRAL VALVE E/A RATIO: 0.94
NRBC BLD-RTO: 0 /100 WBCS (ref 0–0)
P AXIS: 61 DEGREES
P AXIS: 75 DEGREES
P OFFSET: 173 MS
P OFFSET: 177 MS
P ONSET: 120 MS
P ONSET: 120 MS
PLATELET # BLD AUTO: 64 X10*3/UL (ref 150–450)
POTASSIUM SERPL-SCNC: 3.9 MMOL/L (ref 3.5–5.3)
PR INTERVAL: 204 MS
PR INTERVAL: 206 MS
Q ONSET: 222 MS
Q ONSET: 223 MS
QRS COUNT: 12 BEATS
QRS COUNT: 16 BEATS
QRS DURATION: 90 MS
QRS DURATION: 94 MS
QT INTERVAL: 338 MS
QT INTERVAL: 390 MS
QTC CALCULATION(BAZETT): 422 MS
QTC CALCULATION(BAZETT): 427 MS
QTC FREDERICIA: 392 MS
QTC FREDERICIA: 414 MS
R AXIS: -14 DEGREES
R AXIS: 27 DEGREES
RBC # BLD AUTO: 5.02 X10*6/UL (ref 4.5–5.9)
RIGHT VENTRICLE FREE WALL PEAK S': 12.2 CM/S
RIGHT VENTRICLE PEAK SYSTOLIC PRESSURE: 23.4 MMHG
SODIUM SERPL-SCNC: 135 MMOL/L (ref 136–145)
T AXIS: 26 DEGREES
T AXIS: 88 DEGREES
T OFFSET: 391 MS
T OFFSET: 418 MS
TRICUSPID ANNULAR PLANE SYSTOLIC EXCURSION: 2.4 CM
UFH PPP CHRO-ACNC: 0.5 IU/ML
UFH PPP CHRO-ACNC: 0.6 IU/ML
UFH PPP CHRO-ACNC: 0.8 IU/ML
VENTRICULAR RATE: 72 BPM
VENTRICULAR RATE: 94 BPM
WBC # BLD AUTO: 4.5 X10*3/UL (ref 4.4–11.3)

## 2024-07-22 PROCEDURE — 2500000002 HC RX 250 W HCPCS SELF ADMINISTERED DRUGS (ALT 637 FOR MEDICARE OP, ALT 636 FOR OP/ED): Performed by: STUDENT IN AN ORGANIZED HEALTH CARE EDUCATION/TRAINING PROGRAM

## 2024-07-22 PROCEDURE — 36415 COLL VENOUS BLD VENIPUNCTURE: CPT | Performed by: STUDENT IN AN ORGANIZED HEALTH CARE EDUCATION/TRAINING PROGRAM

## 2024-07-22 PROCEDURE — 99239 HOSP IP/OBS DSCHRG MGMT >30: CPT

## 2024-07-22 PROCEDURE — 2500000004 HC RX 250 GENERAL PHARMACY W/ HCPCS (ALT 636 FOR OP/ED): Performed by: INTERNAL MEDICINE

## 2024-07-22 PROCEDURE — G0378 HOSPITAL OBSERVATION PER HR: HCPCS

## 2024-07-22 PROCEDURE — 85520 HEPARIN ASSAY: CPT | Performed by: STUDENT IN AN ORGANIZED HEALTH CARE EDUCATION/TRAINING PROGRAM

## 2024-07-22 PROCEDURE — 93306 TTE W/DOPPLER COMPLETE: CPT | Performed by: INTERNAL MEDICINE

## 2024-07-22 PROCEDURE — 96366 THER/PROPH/DIAG IV INF ADDON: CPT

## 2024-07-22 PROCEDURE — 96376 TX/PRO/DX INJ SAME DRUG ADON: CPT | Mod: 59

## 2024-07-22 PROCEDURE — 82947 ASSAY GLUCOSE BLOOD QUANT: CPT

## 2024-07-22 PROCEDURE — 99223 1ST HOSP IP/OBS HIGH 75: CPT | Performed by: STUDENT IN AN ORGANIZED HEALTH CARE EDUCATION/TRAINING PROGRAM

## 2024-07-22 PROCEDURE — 2500000004 HC RX 250 GENERAL PHARMACY W/ HCPCS (ALT 636 FOR OP/ED): Performed by: STUDENT IN AN ORGANIZED HEALTH CARE EDUCATION/TRAINING PROGRAM

## 2024-07-22 PROCEDURE — RXMED WILLOW AMBULATORY MEDICATION CHARGE

## 2024-07-22 PROCEDURE — 99222 1ST HOSP IP/OBS MODERATE 55: CPT | Performed by: INTERNAL MEDICINE

## 2024-07-22 PROCEDURE — 85027 COMPLETE CBC AUTOMATED: CPT | Performed by: STUDENT IN AN ORGANIZED HEALTH CARE EDUCATION/TRAINING PROGRAM

## 2024-07-22 PROCEDURE — 2500000001 HC RX 250 WO HCPCS SELF ADMINISTERED DRUGS (ALT 637 FOR MEDICARE OP)

## 2024-07-22 PROCEDURE — 93306 TTE W/DOPPLER COMPLETE: CPT

## 2024-07-22 PROCEDURE — 80048 BASIC METABOLIC PNL TOTAL CA: CPT | Performed by: STUDENT IN AN ORGANIZED HEALTH CARE EDUCATION/TRAINING PROGRAM

## 2024-07-22 PROCEDURE — 96374 THER/PROPH/DIAG INJ IV PUSH: CPT | Mod: 59

## 2024-07-22 PROCEDURE — 2500000001 HC RX 250 WO HCPCS SELF ADMINISTERED DRUGS (ALT 637 FOR MEDICARE OP): Performed by: STUDENT IN AN ORGANIZED HEALTH CARE EDUCATION/TRAINING PROGRAM

## 2024-07-22 RX ORDER — INSULIN LISPRO 100 [IU]/ML
0-5 INJECTION, SOLUTION INTRAVENOUS; SUBCUTANEOUS
Status: DISCONTINUED | OUTPATIENT
Start: 2024-07-22 | End: 2024-07-22 | Stop reason: HOSPADM

## 2024-07-22 RX ORDER — TALC
3 POWDER (GRAM) TOPICAL NIGHTLY PRN
Status: DISCONTINUED | OUTPATIENT
Start: 2024-07-22 | End: 2024-07-22 | Stop reason: HOSPADM

## 2024-07-22 RX ORDER — ONDANSETRON 4 MG/1
4 TABLET, FILM COATED ORAL EVERY 8 HOURS PRN
Status: DISCONTINUED | OUTPATIENT
Start: 2024-07-22 | End: 2024-07-22 | Stop reason: HOSPADM

## 2024-07-22 RX ORDER — ISOSORBIDE MONONITRATE 30 MG/1
30 TABLET, EXTENDED RELEASE ORAL DAILY
Status: DISCONTINUED | OUTPATIENT
Start: 2024-07-22 | End: 2024-07-22 | Stop reason: HOSPADM

## 2024-07-22 RX ORDER — ASPIRIN 81 MG/1
81 TABLET ORAL DAILY
Status: DISCONTINUED | OUTPATIENT
Start: 2024-07-22 | End: 2024-07-22 | Stop reason: HOSPADM

## 2024-07-22 RX ORDER — METOPROLOL SUCCINATE 50 MG/1
50 TABLET, EXTENDED RELEASE ORAL DAILY
Status: DISCONTINUED | OUTPATIENT
Start: 2024-07-22 | End: 2024-07-22 | Stop reason: HOSPADM

## 2024-07-22 RX ORDER — DEXTROSE 50 % IN WATER (D50W) INTRAVENOUS SYRINGE
25
Status: DISCONTINUED | OUTPATIENT
Start: 2024-07-22 | End: 2024-07-22 | Stop reason: HOSPADM

## 2024-07-22 RX ORDER — ACETAMINOPHEN 160 MG/5ML
650 SOLUTION ORAL EVERY 4 HOURS PRN
Status: DISCONTINUED | OUTPATIENT
Start: 2024-07-22 | End: 2024-07-22 | Stop reason: HOSPADM

## 2024-07-22 RX ORDER — ACETAMINOPHEN 325 MG/1
650 TABLET ORAL EVERY 4 HOURS PRN
Status: DISCONTINUED | OUTPATIENT
Start: 2024-07-22 | End: 2024-07-22 | Stop reason: HOSPADM

## 2024-07-22 RX ORDER — ACETAMINOPHEN 650 MG/1
650 SUPPOSITORY RECTAL EVERY 4 HOURS PRN
Status: DISCONTINUED | OUTPATIENT
Start: 2024-07-22 | End: 2024-07-22 | Stop reason: HOSPADM

## 2024-07-22 RX ORDER — MORPHINE SULFATE 4 MG/ML
4 INJECTION, SOLUTION INTRAMUSCULAR; INTRAVENOUS EVERY 4 HOURS PRN
Status: DISCONTINUED | OUTPATIENT
Start: 2024-07-22 | End: 2024-07-22 | Stop reason: HOSPADM

## 2024-07-22 RX ORDER — LISINOPRIL 10 MG/1
10 TABLET ORAL DAILY
Status: DISCONTINUED | OUTPATIENT
Start: 2024-07-22 | End: 2024-07-22 | Stop reason: HOSPADM

## 2024-07-22 RX ORDER — POLYETHYLENE GLYCOL 3350 17 G/17G
17 POWDER, FOR SOLUTION ORAL DAILY
Status: DISCONTINUED | OUTPATIENT
Start: 2024-07-22 | End: 2024-07-22 | Stop reason: HOSPADM

## 2024-07-22 RX ORDER — ONDANSETRON HYDROCHLORIDE 2 MG/ML
4 INJECTION, SOLUTION INTRAVENOUS EVERY 8 HOURS PRN
Status: DISCONTINUED | OUTPATIENT
Start: 2024-07-22 | End: 2024-07-22 | Stop reason: HOSPADM

## 2024-07-22 RX ORDER — ROSUVASTATIN CALCIUM 20 MG/1
40 TABLET, COATED ORAL DAILY
Status: DISCONTINUED | OUTPATIENT
Start: 2024-07-22 | End: 2024-07-22 | Stop reason: HOSPADM

## 2024-07-22 RX ORDER — DEXTROSE 50 % IN WATER (D50W) INTRAVENOUS SYRINGE
12.5
Status: DISCONTINUED | OUTPATIENT
Start: 2024-07-22 | End: 2024-07-22 | Stop reason: HOSPADM

## 2024-07-22 SDOH — SOCIAL STABILITY: SOCIAL INSECURITY: DO YOU FEEL UNSAFE GOING BACK TO THE PLACE WHERE YOU ARE LIVING?: NO

## 2024-07-22 SDOH — SOCIAL STABILITY: SOCIAL INSECURITY: DO YOU FEEL ANYONE HAS EXPLOITED OR TAKEN ADVANTAGE OF YOU FINANCIALLY OR OF YOUR PERSONAL PROPERTY?: NO

## 2024-07-22 SDOH — SOCIAL STABILITY: SOCIAL INSECURITY: HAS ANYONE EVER THREATENED TO HURT YOUR FAMILY OR YOUR PETS?: NO

## 2024-07-22 SDOH — SOCIAL STABILITY: SOCIAL INSECURITY: HAVE YOU HAD ANY THOUGHTS OF HARMING ANYONE ELSE?: NO

## 2024-07-22 SDOH — SOCIAL STABILITY: SOCIAL INSECURITY: HAVE YOU HAD THOUGHTS OF HARMING ANYONE ELSE?: NO

## 2024-07-22 SDOH — SOCIAL STABILITY: SOCIAL INSECURITY: ABUSE: ADULT

## 2024-07-22 SDOH — SOCIAL STABILITY: SOCIAL INSECURITY: ARE YOU OR HAVE YOU BEEN THREATENED OR ABUSED PHYSICALLY, EMOTIONALLY, OR SEXUALLY BY ANYONE?: NO

## 2024-07-22 SDOH — SOCIAL STABILITY: SOCIAL INSECURITY: DOES ANYONE TRY TO KEEP YOU FROM HAVING/CONTACTING OTHER FRIENDS OR DOING THINGS OUTSIDE YOUR HOME?: NO

## 2024-07-22 SDOH — SOCIAL STABILITY: SOCIAL INSECURITY: ARE THERE ANY APPARENT SIGNS OF INJURIES/BEHAVIORS THAT COULD BE RELATED TO ABUSE/NEGLECT?: NO

## 2024-07-22 ASSESSMENT — COGNITIVE AND FUNCTIONAL STATUS - GENERAL
DAILY ACTIVITIY SCORE: 24
PATIENT BASELINE BEDBOUND: NO
MOBILITY SCORE: 24
MOBILITY SCORE: 24
DAILY ACTIVITIY SCORE: 24

## 2024-07-22 ASSESSMENT — ACTIVITIES OF DAILY LIVING (ADL)
HEARING - LEFT EAR: HEARING AID
BATHING: INDEPENDENT
DRESSING YOURSELF: INDEPENDENT
PATIENT'S MEMORY ADEQUATE TO SAFELY COMPLETE DAILY ACTIVITIES?: YES
HEARING - RIGHT EAR: HEARING AID
TOILETING: INDEPENDENT
GROOMING: INDEPENDENT
JUDGMENT_ADEQUATE_SAFELY_COMPLETE_DAILY_ACTIVITIES: YES
FEEDING YOURSELF: INDEPENDENT
ADEQUATE_TO_COMPLETE_ADL: YES
LACK_OF_TRANSPORTATION: NO
WALKS IN HOME: INDEPENDENT

## 2024-07-22 ASSESSMENT — LIFESTYLE VARIABLES
SKIP TO QUESTIONS 9-10: 1
PRESCIPTION_ABUSE_PAST_12_MONTHS: NO
AUDIT-C TOTAL SCORE: 0
HOW OFTEN DO YOU HAVE A DRINK CONTAINING ALCOHOL: NEVER
HOW OFTEN DO YOU HAVE 6 OR MORE DRINKS ON ONE OCCASION: NEVER
SUBSTANCE_ABUSE_PAST_12_MONTHS: NO
HOW MANY STANDARD DRINKS CONTAINING ALCOHOL DO YOU HAVE ON A TYPICAL DAY: PATIENT DOES NOT DRINK
AUDIT-C TOTAL SCORE: 0

## 2024-07-22 ASSESSMENT — PATIENT HEALTH QUESTIONNAIRE - PHQ9
SUM OF ALL RESPONSES TO PHQ9 QUESTIONS 1 & 2: 0
2. FEELING DOWN, DEPRESSED OR HOPELESS: NOT AT ALL
1. LITTLE INTEREST OR PLEASURE IN DOING THINGS: NOT AT ALL

## 2024-07-22 ASSESSMENT — PAIN SCALES - GENERAL
PAINLEVEL_OUTOF10: 3
PAINLEVEL_OUTOF10: 7
PAINLEVEL_OUTOF10: 0 - NO PAIN

## 2024-07-22 ASSESSMENT — PAIN DESCRIPTION - LOCATION
LOCATION: CHEST
LOCATION: CHEST

## 2024-07-22 NOTE — ED PROVIDER NOTES
HPI   Chief Complaint   Patient presents with    Chest Pain       Patient is a 63-year-old male with history of hypertension, anemia, PE, MI status post 2 stents, type 2 diabetes, cirrhosis who presents for chest pain.  Patient has had chest pressure for the last few weeks, worse with exertion, has associated shortness of breath with it.  Also some nausea.  Patient did have a PE after his previous stent, states that they attributed to the accessory his leg that then went to his lungs, not currently on anticoagulation.  Denies any fevers or chills, states that he has had some episodes of sweating with exertion that he thought was may be not normal for him.  No vomiting, diarrhea abdominal pain.              Patient History   Past Medical History:   Diagnosis Date    Alcohol abuse, uncomplicated 2022    Alcohol abuse    Old myocardial infarction     History of myocardial infarction    Other specified postprocedural states     History of cardiac cath    Personal history of other diseases of the nervous system and sense organs     History of eustachian tube dysfunction    Personal history of pulmonary embolism     History of pulmonary embolism     Past Surgical History:   Procedure Laterality Date    OTHER SURGICAL HISTORY  10/20/2021    Percutaneous transluminal coronary angioplasty     Family History   Problem Relation Name Age of Onset    Diabetes Mother      Other (Other) Father          Arteriosclerotic cardiovascular disease; cardiac disorder    Heart attack Father      Diabetes Sister      Other (Other) Other Grandfather         cardiac disorder     Social History     Tobacco Use    Smoking status: Former     Current packs/day: 0.00     Types: Cigarettes     Quit date:      Years since quittin.5    Smokeless tobacco: Never   Substance Use Topics    Alcohol use: Not Currently    Drug use: Never       Physical Exam   ED Triage Vitals   Temperature Heart Rate Respirations BP   24  2014 07/21/24 2014 07/21/24 2014   36.4 °C (97.5 °F) 94 18 149/74      Pulse Ox Temp src Heart Rate Source Patient Position   07/21/24 2014 -- -- --   99 %         BP Location FiO2 (%)     -- --             Physical Exam  Constitutional:       General: He is not in acute distress.  HENT:      Head: Normocephalic.   Eyes:      Extraocular Movements: Extraocular movements intact.      Conjunctiva/sclera: Conjunctivae normal.      Pupils: Pupils are equal, round, and reactive to light.   Cardiovascular:      Rate and Rhythm: Normal rate and regular rhythm.      Pulses: Normal pulses.           Radial pulses are 2+ on the right side and 2+ on the left side.        Dorsalis pedis pulses are 2+ on the right side and 2+ on the left side.      Heart sounds: Normal heart sounds.   Pulmonary:      Effort: Pulmonary effort is normal.      Breath sounds: Normal breath sounds.   Abdominal:      General: There is no distension.      Palpations: Abdomen is soft. There is no mass.      Tenderness: There is no abdominal tenderness. There is no guarding.   Musculoskeletal:         General: No deformity.      Cervical back: Normal range of motion and neck supple.      Right lower leg: No edema.      Left lower leg: No edema.   Skin:     General: Skin is warm and dry.      Findings: No lesion or rash.   Neurological:      General: No focal deficit present.      Mental Status: He is alert and oriented to person, place, and time. Mental status is at baseline.      Cranial Nerves: No cranial nerve deficit.      Sensory: No sensory deficit.      Motor: No weakness.   Psychiatric:         Mood and Affect: Mood normal.           ED Course & MDM   Diagnoses as of 07/21/24 2307   Other acute pulmonary embolism without acute cor pulmonale (Multi)                       No data recorded                      Medical Decision Making  EKG my interpretation: Rate 94, rhythm regular, axis normal, , QRS 90, QTc 422, T waves: Inversion in aVL, ST:  Slight depression in lead I, no elevations, interpretation: Normal sinus rhythm, no STEMI    EKG on my interpretation from 2018: Rate 84, rhythm regular, axis normal, , QRS 90, QTc 427, T waves: Unremarkable, ST segments: No elevations or depressions, dictation: Normal sinus rhythm, no STEMI    EKG my interpretation from 2019: Rate 84, rhythm regular, axis normal, , QRS 90, QTc 432, T waves: Unremarkable, ST segments: No elevations or depressions, dictation: Normal sinus rhythm, no STEMI    EKG on my interpretation: Rate 72, rhythm regular, axis normal, , QRS 94, QTc 427, T waves: Unremarkable, ST segments: No elevations or depressions, interpretation: Normal sinus rhythm, no STEMI    Patient is getting patient is a 64-year-old male with above-stated past medical history who presents for chest pain.  Patient's EKG initially was noted as a STEMI by the machine interpretation, however I reviewed this and also discussed it with the on-call interventional cardiologist who agreed that the exam is not it was not consistent with a STEMI.  She is doing better patient's troponins are negative x 2.  She does experience a CT scan was ordered given his past pulmonary embolism.  While that was pending, I did start the patient on an NSTEMI heparin drip and bolus.  Patient's BNP is not consistent with CHF exacerbation.  Lipase is not consistent with pancreatitis.  CMP is gross unremarkable.  CBC shows no leukocytosis or anemia.  Chest x-ray my review did not show signs of pneumonia pneumothorax.  CT scan showed suspicion for right lower lobe subsegmental pulmonary emboli.  Patient's heparin drip and bolus were updated to cover for a pulmonary embolism.  Patient has remained stable.  Patient was admitted to the medicine service for further management.    Disclaimer: This note was dictated using speech recognition software. Minor errors in transcription may be present. Please call if questions.     Perfecto Abreu  MD  [facility] Emergency Medicine  Contact on [employer]          Problems Addressed:  Other acute pulmonary embolism without acute cor pulmonale (Multi): acute illness or injury    Amount and/or Complexity of Data Reviewed  Labs: ordered.  Radiology: ordered and independent interpretation performed.  ECG/medicine tests: ordered and independent interpretation performed.        Procedure  Critical Care    Performed by: Perfecto Abreu MD  Authorized by: Perfecto Abreu MD    Critical care provider statement:     Critical care time (minutes):  35    Critical care time was exclusive of:  Separately billable procedures and treating other patients    Critical care was necessary to treat or prevent imminent or life-threatening deterioration of the following conditions: pulmonary emblism.    Critical care was time spent personally by me on the following activities:  Development of treatment plan with patient or surrogate, discussions with consultants, evaluation of patient's response to treatment, examination of patient, obtaining history from patient or surrogate, ordering and performing treatments and interventions, ordering and review of laboratory studies, ordering and review of radiographic studies, pulse oximetry, re-evaluation of patient's condition and review of old charts    Care discussed with: admitting provider         Perfecto Abreu MD  07/21/24 7890

## 2024-07-22 NOTE — DISCHARGE INSTRUCTIONS
Dr. Birmingham's office was instructed to reschedule your Lexiscan stress test    Thank you for choosing [facility]. It has been a pleasure taking part in your medical care. Please follow up with your primary care provider as instructed. If your symptoms should persist or worsen, please contact your primary care physician, or in the case of an emergency proceed to the nearest Emergency Room for further care. If you have any questions about the care you received, please call [facility] at [phone]. Thank you again! BONNIE Brasher

## 2024-07-22 NOTE — H&P
Medical Group History and Physical      ASSESSMENT & PLAN:     #.  Acute pulmonary embolism without cor pulmonale  #.  Chest pain  #.  CAD with past stenting  #.  Cirrhosis  #.  Chronic thrombocytopenia  #.  Type 2 diabetes  #.  Prior provoked PE  -CTPA showed nonenhancement of right lower lobe subsegmental branches suspicious for PE as well as cirrhotic liver morphology  -Continue heparin drip  -Nurse navigator consult for Eliquis, likely will require lifelong anticoagulation therapy  -Telemetry  -Cardiology consult  -N.p.o. at midnight  -Continue home statin, metoprolol, aspirin  -Insulin sliding scale, hypoglycemic protocol, Accu-Cheks  -Chronic thrombocytopenia at baseline, no signs of decompensated cirrhosis, no active bleeding    VTE PPX: Heparin drip      Daniel Marcial MD    --Of note, this documentation is completed using the Dragon Dictation system (voice recognition software). There may be spelling and/or grammatical errors that were not corrected prior to final submission.--    HISTORY OF PRESENT ILLNESS:   Chief Complaint: Chest pain    Yuri Reynolds is a 64 y.o. male with a history of CAD with past stenting, EtOH cirrhosis complicated by chronic thrombocytopenia, hypertension, type 2 diabetes, provoked PE not currently on anticoagulation presenting with pain and shortness of breath.  Patient states he has been having chest pain/pressure for the last few weeks that is worse with exertion.  He also feels slightly short of breath when he gets the chest pain.  He denies any hemoptysis.  He does have a history of past stenting and did have a PE after this however they attributed this to the procedure and he is not currently on any anticoagulation.  Denies any fever or chills.  Denies additional symptoms.  Vital signs stable.  Labs unremarkable including negative troponin x 2.  CTPA did show nonenhancement of right lower lobe subsegmental branches suspicious for PE as well as cirrhotic liver  morphology.  Patient was started on heparin drip and cardiology was consulted.       ROS  10 point review of systems negative except per HPI     PAST HISTORIES:     Past Medical History  He has a past medical history of Alcohol abuse, uncomplicated (12/20/2022), Old myocardial infarction, Other specified postprocedural states, Personal history of other diseases of the nervous system and sense organs, and Personal history of pulmonary embolism.    Surgical History  He has a past surgical history that includes Other surgical history (10/20/2021).     Social History  He reports that he quit smoking about 8 years ago. His smoking use included cigarettes. He has never used smokeless tobacco. He reports that he does not currently use alcohol. He reports that he does not use drugs.    Family History  Family History   Problem Relation Name Age of Onset    Diabetes Mother      Other (Other) Father          Arteriosclerotic cardiovascular disease; cardiac disorder    Heart attack Father      Diabetes Sister      Other (Other) Other Grandfather         cardiac disorder       Allergies:  Patient has no known allergies.      OBJECTIVE:      Last Recorded Vitals  /66   Pulse 69   Temp 36.4 °C (97.5 °F)   Resp 18   Wt 107 kg (235 lb)   SpO2 97%     Last I/O:  No intake/output data recorded.    Physical Exam   Gen: NAD, appears stated age  HEENT: EOM, MMM  CV: RRR, no murmurs rubs or gallops  Resp: Clear to auscultation bilaterally, normal effort  Abdomen: soft, NT,+BS  LE: No edema, no deformity  Neuro: A&Ox4, moving all extremities    LABS AND IMAGING:       Relevant Results  Labs Reviewed   CBC WITH AUTO DIFFERENTIAL - Abnormal       Result Value    WBC 6.2      nRBC 0.0      RBC 5.49      Hemoglobin 15.6      Hematocrit 45.5      MCV 83      MCH 28.4      MCHC 34.3      RDW 13.1      Platelets 81 (*)     Neutrophils % 65.6      Immature Granulocytes %, Automated 0.3      Lymphocytes % 26.0      Monocytes % 6.8       Eosinophils % 1.0      Basophils % 0.3      Neutrophils Absolute 4.03      Immature Granulocytes Absolute, Automated 0.02      Lymphocytes Absolute 1.60      Monocytes Absolute 0.42      Eosinophils Absolute 0.06      Basophils Absolute 0.02     COMPREHENSIVE METABOLIC PANEL - Abnormal    Glucose 95      Sodium 135 (*)     Potassium 3.9      Chloride 102      Bicarbonate 24      Anion Gap 13      Urea Nitrogen 16      Creatinine 1.05      eGFR 80      Calcium 9.9      Albumin 4.8      Alkaline Phosphatase 82      Total Protein 7.5      AST 26      Bilirubin, Total 1.0      ALT 29     LIPASE - Normal    Lipase 59      Narrative:     Venipuncture immediately after or during the administration of Metamizole may lead to falsely low results. Testing should be performed immediately prior to Metamizole dosing.   SERIAL TROPONIN-INITIAL - Normal    Troponin I, High Sensitivity 4      Narrative:     Less than 99th percentile of normal range cutoff-  Female and children under 18 years old <14 ng/L; Male <21 ng/L: Negative  Repeat testing should be performed if clinically indicated.     Female and children under 18 years old 14-50 ng/L; Male 21-50 ng/L:  Consistent with possible cardiac damage and possible increased clinical   risk. Serial measurements may help to assess extent of myocardial damage.     >50 ng/L: Consistent with cardiac damage, increased clinical risk and  myocardial infarction. Serial measurements may help assess extent of   myocardial damage.      NOTE: Children less than 1 year old may have higher baseline troponin   levels and results should be interpreted in conjunction with the overall   clinical context.     NOTE: Troponin I testing is performed using a different   testing methodology at [facility] than at other   [facility] hospitals. Direct result comparisons should only   be made within the same method.   SERIAL TROPONIN, 1 HOUR - Normal    Troponin I, High Sensitivity 4      Narrative:      Less than 99th percentile of normal range cutoff-  Female and children under 18 years old <14 ng/L; Male <21 ng/L: Negative  Repeat testing should be performed if clinically indicated.     Female and children under 18 years old 14-50 ng/L; Male 21-50 ng/L:  Consistent with possible cardiac damage and possible increased clinical   risk. Serial measurements may help to assess extent of myocardial damage.     >50 ng/L: Consistent with cardiac damage, increased clinical risk and  myocardial infarction. Serial measurements may help assess extent of   myocardial damage.      NOTE: Children less than 1 year old may have higher baseline troponin   levels and results should be interpreted in conjunction with the overall   clinical context.     NOTE: Troponin I testing is performed using a different   testing methodology at [facility] than at other   [facility]. Direct result comparisons should only   be made within the same method.   B-TYPE NATRIURETIC PEPTIDE - Normal    BNP 18      Narrative:        <100 pg/mL - Heart failure unlikely  100-299 pg/mL - Intermediate probability of acute heart                  failure exacerbation. Correlate with clinical                  context and patient history.    >=300 pg/mL - Heart Failure likely. Correlate with clinical                  context and patient history.    BNP testing is performed using different testing methodology at [facility] than at other [facility]. Direct result comparisons should only be made within the same method.      TROPONIN SERIES- (INITIAL, 1 HR)    Narrative:     The following orders were created for panel order Troponin I Series, High Sensitivity (0, 1 HR).  Procedure                               Abnormality         Status                     ---------                               -----------         ------                     Troponin I, High Sensiti...[[id number]]  Normal              Final result                Troponin, High Sensitivi...[[id number]]  Normal              Final result                 Please view results for these tests on the individual orders.     XR chest 1 view   Final Result   No acute pulmonary abnormality.   Signed by Mario Alberto Walker MD      CT angio chest for pulmonary embolism   Final Result   1. Non-enhancement of a right lower lobe subsegmental branches   suspicious for pulmonary emboli.   2. Cirrhotic liver morphology.   NOTIFICATION:  The critical results of the study were discussed with,   and acknowledged by Dr. Perfecto Abreu by telephone on 7/21/2024 at   22:22.   Signed by Mario Alberto Walker MD

## 2024-07-22 NOTE — NURSING NOTE
Referral noted for Eliquis assistance    Provided patient with Eliquis Starter Pack. Educated patient on dosing instructions and answered questions. Next fill $45 through insurance. Gave patient a $10 copay card

## 2024-07-22 NOTE — CARE PLAN
The patient's goals for the shift include Patient will be chest pain free    The clinical goals for the shift include Pain control

## 2024-07-22 NOTE — PROGRESS NOTES
07/22/24 1205   Discharge Planning   Living Arrangements Spouse/significant other   Support Systems Spouse/significant other   Assistance Needed none   Type of Residence Private residence   Number of Stairs to Enter Residence 4   Number of Stairs Within Residence 0   Do you have animals or pets at home? No   Who is requesting discharge planning? Provider   Home or Post Acute Services None   Expected Discharge Disposition Home   Patient Choice   Provider Choice list and CMS website (https://medicare.gov/care-compare#search) for post-acute Quality and Resource Measure Data were provided and reviewed with: Patient   Patient / Family choosing to utilize agency / facility established prior to hospitalization No     Patient to discharge home, RN Navigator on consult for Eliquis. No other needs identified.

## 2024-07-22 NOTE — CONSULTS
Inpatient consult to Cardiology  Consult performed by: Kip Birmingham MD  Consult ordered by: Daniel Marcial MD  Reason for consult: Chest pain      Cardiology Consult Note      Date:   7/22/2024  Patient name:  Yuri Reynolds  Date of admission:  7/21/2024  8:08 PM  MRN:   [id number]  YOB: 1960  Time of Consult:  10:13 AM    Consulting Cardiologist: Dr. Kip Birmingham      Primary Cardiologist:  Dr. Kip Birmingham    Admission Diagnosis:     Pulmonary embolism without acute cor pulmonale, unspecified chronicity, unspecified pulmonary embolism type (Multi)      History of Present Illness:      Yuri Reynolds is a 64 y.o.  male patient who is being at the request of Dr. Daniel Marcial for inpatient consultation of  chest pain . He was admitted on 7/21/2024. Previous records have been reviewed in detail.  He has a history of atherosclerotic heart disease with remote inferior wall myocardial infarction 1999. He had an apical myocardial infarction in August 2003. He underwent angioplasty and stenting of the right coronary artery in 1999 and in 2003. A myocardial perfusion study in April 2015 was negative for ischemia with calculated LV ejection fraction of 65%. He has a history of essential hypertension, type 2 diabetes mellitus, and hyperlipidemia. He stopped smoking tobacco back in January 2017.      He previously noted some intermittent palpitations and a Holter monitor showed normal sinus rhythm with occasional PACs and PVCs. His symptoms correlated with the ectopy. He also had some exertional shortness breath in the setting of progressive weight gain. Extensive pulmonary evaluation was unremarkable. An echocardiogram in August 2017 showed normal LV systolic function and normal valvular structure and function. He was treated in 2019 for a bowel obstruction. He was managed conservatively and it resolved. In the process he was diagnosed with cirrhosis of the liver. He was drinking  quite heavily at the time but has completely stopped using any alcohol. He also stopped smoking.    He was seen in the office April 10, 2024 and complained of some exertional shortness of breath, fatigue, and diaphoresis.  He was scheduled for an echocardiogram and Lexiscan myocardial perfusion study.  There were some delays in scheduling and he was actually set up to have the studies completed in the office today.    He states he has continued to experience shortness of breath with lesser amounts of exertion.  He has also had some intermittent pressure discomfort in his upper chest over the past few weeks.  He states on at least 2 occasions the chest pressure was quite intense and prolonged.  At 1 point he thought he might be having a heart attack.  He decided to come to the emergency department last evening.  Initial EKG showed normal sinus rhythm with some lateral nonspecific ST-T wave changes.  Repeat EKG was normal.  High-sensitivity troponin level 4 and 4.  CBC and CMP normal with exception of sodium 135.  CT of the chest showed nonenhancement of right lower lobe subsegmental branches suspicious for pulmonary emboli.  He was placed on a heparin drip and admitted to telemetry.    He denies any recent travel.  He has been active without limitations.  He denies any trauma.  He states he currently feels fine.  He denies any chest pain or shortness of breath at rest.  His vital signs are stable.   He denies any orthopnea, PND, or increasing peripheral edema.  He denies any palpitations, lightheadedness, near-syncope, or syncope.  He denies any fever, chills, or cough.  He denies any nausea or vomiting.  He denies any hemoptysis, hematemesis, melena, or hematochezia.  He works full-time for [employer].        Allergies:     No Known Allergies      Past Medical History:     Past Medical History:   Diagnosis Date    Alcohol abuse, uncomplicated 12/20/2022    Alcohol abuse    Old myocardial infarction      History of myocardial infarction    Other specified postprocedural states     History of cardiac cath    Personal history of other diseases of the nervous system and sense organs     History of eustachian tube dysfunction    Personal history of pulmonary embolism     History of pulmonary embolism       Past Surgical History:     Past Surgical History:   Procedure Laterality Date    OTHER SURGICAL HISTORY  10/20/2021    Percutaneous transluminal coronary angioplasty       Family History:     Family History   Problem Relation Name Age of Onset    Diabetes Mother      Other (Other) Father          Arteriosclerotic cardiovascular disease; cardiac disorder    Heart attack Father      Diabetes Sister      Other (Other) Other Grandfather         cardiac disorder       Social History:     Social History     Tobacco Use    Smoking status: Former     Current packs/day: 0.00     Types: Cigarettes     Quit date:      Years since quittin.5     Passive exposure: Never    Smokeless tobacco: Never   Vaping Use    Vaping status: Never Used   Substance Use Topics    Alcohol use: Not Currently    Drug use: Never       CURRENT HOSPITAL MEDICATIONS    aspirin, 81 mg, oral, Daily  insulin lispro, 0-5 Units, subcutaneous, TID  isosorbide mononitrate ER, 30 mg, oral, Daily  lisinopril, 10 mg, oral, Daily  metoprolol succinate XL, 50 mg, oral, Daily  polyethylene glycol, 17 g, oral, Daily  rosuvastatin, 40 mg, oral, Daily      heparin, 0-4,500 Units/hr, Last Rate: 1,700 Units/hr (24 0545)      Current Outpatient Medications   Medication Instructions    aspirin 81 mg EC tablet 1 tablet, oral, Daily    isosorbide mononitrate ER (IMDUR) 30 mg, oral, Daily, DO NOT CRUSH OR CHEW    lisinopril 10 mg, oral, Daily    metFORMIN XR (GLUCOPHAGE-XR) 500 mg, oral, Daily with evening meal    metoprolol succinate XL (TOPROL-XL) 50 mg, oral, Daily    rosuvastatin (CRESTOR) 40 mg, oral, Daily    sildenafil (Viagra) 100 mg tablet Take 1  tablet 30-60 min before intercourse.    triamcinolone (Kenalog) 0.1 % cream 1 Application, Topical, 2 times daily PRN, Monday - Friday only        Review of Systems:      12 point review of systems was obtained in detail and is negative other than that detailed above.      Vital Signs:     Vitals:    07/21/24 2316 07/22/24 0112 07/22/24 0250 07/22/24 0719   BP: 117/66 111/62 114/60 102/64   BP Location:  Right arm  Right arm   Patient Position:  Lying  Lying   Pulse: 69 66 70 61   Resp: 18 19 18 18   Temp:  36.2 °C (97.2 °F) 36.5 °C (97.7 °F) 36.5 °C (97.7 °F)   TempSrc:  Temporal  Temporal   SpO2:  97% 97% 97%   Weight:  108 kg (238 lb 8.6 oz)     Height:  1.829 m (6')         Intake/Output Summary (Last 24 hours) at 7/22/2024 1013  Last data filed at 7/22/2024 0342  Gross per 24 hour   Intake 187.43 ml   Output --   Net 187.43 ml       Wt Readings from Last 4 Encounters:   07/22/24 108 kg (238 lb 8.6 oz)   04/10/24 109 kg (240 lb)   03/18/24 111 kg (245 lb 4.8 oz)   12/18/23 109 kg (240 lb 11.2 oz)       Physical Examination:     GENERAL APPEARANCE: Well developed, well nourished, in no acute distress.  CHEST: Symmetric and non-tender.  INTEGUMENT: Skin warm and dry, without gross excoriationis or lesions.  HEENT: No gross abnormalities of conjunctiva, teeth, gums, oral mucosa  NECK: Supple, no JVD, no bruit. Thyroid not palpable. Carotid upstrokes normal.  NEURO/PSHCY: Alert and oriented x3; appropriate behavior and responses and responses, grossly normal cerebellar function with normal balance and coordination  LUNGS: Clear to auscultation bilaterally; normal respiratory effort.  HEART: Rate and rhythm regular with no evident murmur; no gallop appreciated. There are no rubs, clicks or heaves. PMI nondisplaced.  ABDOMEN: Soft, nontender, no palpable hepatosplenomegaly, no mases, no bruits. Abdominal aorta not noted to be enlarged.  MUSCULOSKELETAL: Ambulatory with normal tandem gait.  EXTREMITIES: Warm with good  color, no clubbing or cyanois. There is no edema noted.  PERIPHERAL VASCULAR: Pulses present and equally palpable; 2+ throughout. No femoral bruits.      Lab:     CBC:   Lab Results   Component Value Date    WBC 4.5 07/22/2024    RBC 5.02 07/22/2024    HGB 14.2 07/22/2024    HCT 41.6 07/22/2024    PLT 64 (L) 07/22/2024        CMP:    Lab Results   Component Value Date     (L) 07/22/2024    K 3.9 07/22/2024     07/22/2024    CO2 24 07/22/2024    BUN 16 07/22/2024    CREATININE 0.89 07/22/2024    GLUCOSE 107 (H) 07/22/2024    CALCIUM 9.3 07/22/2024       BMP:  Lab Results   Component Value Date     (L) 07/22/2024     (L) 07/21/2024    K 3.9 07/22/2024    K 3.9 07/21/2024     07/22/2024     07/21/2024    CO2 24 07/22/2024    CO2 24 07/21/2024    BUN 16 07/22/2024    BUN 16 07/21/2024    CREATININE 0.89 07/22/2024    CREATININE 1.05 07/21/2024       CBC:  Lab Results   Component Value Date    WBC 4.5 07/22/2024    WBC 6.2 07/21/2024    RBC 5.02 07/22/2024    RBC 5.49 07/21/2024    HGB 14.2 07/22/2024    HGB 15.6 07/21/2024    HCT 41.6 07/22/2024    HCT 45.5 07/21/2024    MCV 83 07/22/2024    MCV 83 07/21/2024    MCH 28.3 07/22/2024    MCH 28.4 07/21/2024    MCHC 34.1 07/22/2024    MCHC 34.3 07/21/2024    RDW 13.1 07/22/2024    RDW 13.1 07/21/2024    PLT 64 (L) 07/22/2024    PLT 81 (L) 07/21/2024       Hepatic Function Panel:    Lab Results   Component Value Date    ALKPHOS 82 07/21/2024    ALT 29 07/21/2024    AST 26 07/21/2024    PROT 7.5 07/21/2024    BILITOT 1.0 07/21/2024       Cardiac Enzymes:    Lab Results   Component Value Date    TROPHS 4 07/21/2024    TROPHS 4 07/21/2024       BNP:   Lab Results   Component Value Date    BNP 18 07/21/2024        HgBA1c:    Lab Results   Component Value Date    HGBA1C 5.6 03/14/2024       Diagnostic Studies:     ECG 12 lead  Result Date: 7/22/2024    Normal sinus rhythm Normal ECG When compared with ECG of 21-JUL-2024 20:19, (unconfirmed) No  significant change was found      Radiology:     XR chest 1 view   Final Result   No acute pulmonary abnormality.   Signed by Mario Alberto Walker MD      CT angio chest for pulmonary embolism   Final Result   1. Non-enhancement of a right lower lobe subsegmental branches   suspicious for pulmonary emboli.   2. Cirrhotic liver morphology.   NOTIFICATION:  The critical results of the study were discussed with,   and acknowledged by Dr. Perfecto Abreu by telephone on 7/21/2024 at   22:22.   Signed by Mario Alberto Walker MD          Problem List:     Patient Active Problem List   Diagnosis    Abnormal LFTs    Coronary artery disease involving native coronary artery of native heart with angina pectoris (CMS-HCC)    Bilateral sensorineural hearing loss    Chronic nasal congestion    Cirrhosis (Multi)    Cough    Dizziness    Elevated serum creatinine    Eustachian tube dysfunction    Fatty liver    Hearing loss, bilateral    History of PTCA    Hyperlipidemia    Hypersplenism    Hypertension    Inability to maintain erection    Insomnia    Memory changes    Nicotine dependence    Palpitations    Past myocardial infarction    Retained myringotomy tube    Small bowel obstruction (Multi)    Somnolence, daytime    Thrombocytopenia (CMS-HCC)    Type 2 diabetes mellitus without complication, without long-term current use of insulin (Multi)    History of alcohol abuse    Class 1 obesity with serious comorbidity and body mass index (BMI) of 33.0 to 33.9 in adult    Abnormal finding in urine    Alcohol abuse    Anal discharge    Bursitis of elbow    Difficulty breathing    Dysfunction of both eustachian tubes    History of cardiac catheterization    History of ear disorder    History of pulmonary embolism    Inflammatory dermatosis    Lateral epicondylitis of both elbows    Portal hypertension (Multi)    Tinnitus, right ear    Pulmonary embolism without acute cor pulmonale, unspecified chronicity, unspecified pulmonary embolism type  (Multi)    Chest pain       Assessment:     1.  Acute unprovoked pulmonary embolus.  No evidence of right heart strain.  Recent worsening exertional shortness of breath.  Patient has history of previous pulmonary embolus.    2.  Atherosclerotic heart disease with remote inferior wall myocardial infarction 1999. He had an apical myocardial infarction in August 2003. Status post angioplasty and stenting of the right coronary artery in 1999 and in 2003.  Documented normal LV systolic function.    3.  History of prior tobacco abuse.    4.  Primary hypertension.    5.  Type 2 diabetes mellitus.    6.  Cirrhosis.      Plan:     Okay to transition IV heparin over to oral Eliquis 10 mg twice daily for 7 days then 5 mg twice daily.  In light of recurrent pulmonary embolus would recommend patient be continued on Eliquis indefinitely.  He denies any family history of clotting disorders.    Review echocardiogram.    Lexiscan myocardial perfusion study will be rescheduled to be done in the near future.    Increase Imdur to 60 mg daily.    Okay from cardiac standpoint to discharge home.    Thank you for the opportunity to participate in the care of your patient.  Please do not hesitate to call if you have any questions.    Electronically signed by Kip Birmingham MD, on 7/22/2024 at 10:13 AM

## 2024-07-22 NOTE — DISCHARGE SUMMARY
Discharge Diagnosis  Pulmonary embolism without acute cor pulmonale, unspecified chronicity, unspecified pulmonary embolism type (Multi)    Issues Requiring Follow-Up  none    Discharge Meds     Your medication list        START taking these medications        Instructions Last Dose Given Next Dose Due   Eliquis DVT-PE Treat 30D Start 5 mg (74 tabs) tablet  Generic drug: apixaban  Start taking on: July 22, 2024      Take 2 tablets (10 mg) by mouth 2 times a day for 7 days, THEN 1 tablet (5 mg) 2 times a day for 23 days.              CONTINUE taking these medications        Instructions Last Dose Given Next Dose Due   aspirin 81 mg EC tablet           isosorbide mononitrate ER 30 mg 24 hr tablet  Commonly known as: Imdur      TAKE 1 TABLET BY MOUTH ONCE DAILY. DO NOT CRUSH OR CHEW       lisinopril 10 mg tablet      Take 1 tablet (10 mg) by mouth once daily.       metFORMIN  mg 24 hr tablet  Commonly known as: Glucophage-XR      Take 1 tablet (500 mg) by mouth once daily in the evening. Take with meals.       metoprolol succinate XL 50 mg 24 hr tablet  Commonly known as: Toprol-XL      Take 1 tablet (50 mg) by mouth once daily.       rosuvastatin 40 mg tablet  Commonly known as: Crestor      Take 1 tablet (40 mg) by mouth once daily.       sildenafil 100 mg tablet  Commonly known as: Viagra      Take 1 tablet 30-60 min before intercourse.       triamcinolone 0.1 % cream  Commonly known as: Kenalog      Apply 1 Application topically 2 times a day as needed (hisotry of dermatitis). Monday - Friday only                 Where to Get Your Medications        These medications were sent to [facility]  [address] [place] [id number]      Hours: 9 AM to 5:30 PM Mon-Fri, 9 AM to 1 PM Saturday Phone: [phone]   Eliquis DVT-PE Treat 30D Start 5 mg (74 tabs) tablet         Test Results Pending At Discharge  Pending Labs       No current pending labs.            Last Vitals  Vitals:     07/22/24 0250 07/22/24 0719 07/22/24 1229 07/22/24 1230   BP: 114/60 102/64 96/55 93/54   BP Location:  Right arm Right arm Left arm   Patient Position:  Lying Lying Lying   Pulse: 70 61 62 63   Resp: 18 18 18    Temp: 36.5 °C (97.7 °F) 36.5 °C (97.7 °F) 36.6 °C (97.9 °F)    TempSrc:  Temporal Temporal    SpO2: 97% 97% 97% 96%   Weight:       Height:           Hospital Course  Yuri Reynolds is a 64 y.o. male with a history of CAD with past stenting, EtOH cirrhosis complicated by chronic thrombocytopenia, hypertension, type 2 diabetes, provoked PE not currently on anticoagulation presenting with pain and shortness of breath.  Patient states he has been having chest pain/pressure for the last few weeks that is worse with exertion.  He also feels slightly short of breath when he gets the chest pain.  He denies any hemoptysis.  He does have a history of past stenting and did have a PE after this however they attributed this to the procedure and he is not currently on any anticoagulation.  Denies any fever or chills.  Denies additional symptoms.  Vital signs stable.  Labs unremarkable including negative troponin x 2.  CTPA did show nonenhancement of right lower lobe subsegmental branches suspicious for PE as well as cirrhotic liver morphology.  Patient was started on heparin drip and cardiology was consulted.  Cardiology recommended transitioning to apixaban.  Heparin drip.  Patient met with nurse navigator and set up with 30-day supply of apixaban.  Patient will be on anticoagulation indefinitely.  Patient will be contacted by Dr. Birmingham's office to schedule Lexiscan.  Patient showed improvement did not require supplemental O2.  On day of discharge patient hemodynamically stable.       Pertinent Physical Exam At Time of Discharge  Physical Exam  Constitutional:       Appearance: Normal appearance.   HENT:      Head: Normocephalic.      Mouth/Throat:      Mouth: Mucous membranes are moist.   Eyes:      Pupils: Pupils  are equal, round, and reactive to light.   Cardiovascular:      Rate and Rhythm: Normal rate and regular rhythm.      Heart sounds: Normal heart sounds, S1 normal and S2 normal.   Pulmonary:      Effort: Pulmonary effort is normal.      Breath sounds: Normal breath sounds.   Abdominal:      General: Bowel sounds are normal.      Palpations: Abdomen is soft.   Musculoskeletal:         General: Normal range of motion.      Cervical back: Neck supple.   Skin:     General: Skin is warm.   Neurological:      Mental Status: He is alert and oriented to person, place, and time.   Psychiatric:         Mood and Affect: Mood normal.         Behavior: Behavior normal.         Outpatient Follow-Up  Future Appointments   Date Time Provider Department Center   7/30/2024  3:40 PM Marc Harding DO [id number] [place]   8/5/2024  3:45 PM Kip Birmingham MD [id number] [place]   12/9/2024  3:30 PM Kip Birmingham MD [id number] [place]         Sameera Childress, APRN-CNP

## 2024-07-22 NOTE — PROGRESS NOTES
07/22/24 1517   Current Planned Discharge Disposition   Current Planned Discharge Disposition Home     Patient to discharge home, RN navigator consulted for [place].

## 2024-07-22 NOTE — HOSPITAL COURSE
Yuri Reynolds is a 64 y.o. male with a history of CAD with past stenting, EtOH cirrhosis complicated by chronic thrombocytopenia, hypertension, type 2 diabetes, provoked PE not currently on anticoagulation presenting with pain and shortness of breath.  Patient states he has been having chest pain/pressure for the last few weeks that is worse with exertion.  He also feels slightly short of breath when he gets the chest pain.  He denies any hemoptysis.  He does have a history of past stenting and did have a PE after this however they attributed this to the procedure and he is not currently on any anticoagulation.  Denies any fever or chills.  Denies additional symptoms.  Vital signs stable.  Labs unremarkable including negative troponin x 2.  CTPA did show nonenhancement of right lower lobe subsegmental branches suspicious for PE as well as cirrhotic liver morphology.  Patient was started on heparin drip and cardiology was consulted.  Cardiology recommended transitioning to apixaban.  Heparin drip.  Patient met with nurse navigator and set up with 30-day supply of apixaban.  Patient will be on anticoagulation indefinitely.  Patient will be contacted by Dr. Birmingham's office to schedule Lexiscan.  Patient showed improvement did not require supplemental O2.  On day of discharge patient hemodynamically stable.

## 2024-07-22 NOTE — CARE PLAN
The patient's goals for the shift include Patient will be chest pain free    The clinical goals for the shift include Pain control    Problem: Pain - Adult  Goal: Verbalizes/displays adequate comfort level or baseline comfort level  Outcome: Progressing

## 2024-07-23 ENCOUNTER — PATIENT OUTREACH (OUTPATIENT)
Dept: CARE COORDINATION | Facility: CLINIC | Age: 64
End: 2024-07-23
Payer: COMMERCIAL

## 2024-07-23 NOTE — PROGRESS NOTES
Discharge Facility:  [facility]   Discharge Diagnosis: Pulmonary embolism without acute cor pulmonale, unspecified chronicity, unspecified pulmonary embolism type (Multi)   Admission Date: 7/21/24  Discharge Date: 7/22/24    PCP Appointment Date: Marc Harding DO 7/30/24  Specialist Appointment Date:  Cardiology Dr Birmingham 8/5/24  Hospital Encounter and Summary Linked: Yes  See discharge assessment below for further details     Engagement  Call Start Time: 1055 (7/23/2024 10:55 AM)    Medications  Medications reviewed with patient/caregiver?: Yes (7/23/2024 10:55 AM)  Is the patient having any side effects they believe may be caused by any medication additions or changes?: No (7/23/2024 10:55 AM)  Does the patient have all medications ordered at discharge?: Yes (7/23/2024 10:55 AM)  Care Management Interventions: No intervention needed (7/23/2024 10:55 AM)  Prescription Comments: Eliquis DVT-PE Treat 30D Start 5 mg (74 tabs) tablet (7/23/2024 10:55 AM)  Is the patient taking all medications as directed (includes completed medication regime)?: Yes (7/23/2024 10:55 AM)  Medication Comments: no issues at this time obtaining medication (7/23/2024 10:55 AM)    Appointments  Does the patient have a primary care provider?: Yes (7/23/2024 10:55 AM)  Care Management Interventions: Verified appointment date/time/provider (Marc Harding DO 7/30/24) (7/23/2024 10:55 AM)  Has the patient kept scheduled appointments due by today?: Yes (7/23/2024 10:55 AM)  Care Management Interventions: Advised patient to keep appointment (7/23/2024 10:55 AM)    Self Management  What is the home health agency?: denies need (7/23/2024 10:55 AM)  What Durable Medical Equipment (DME) was ordered?: denies need (7/23/2024 10:55 AM)    Patient Teaching  Does the patient have access to their discharge instructions?: Yes (7/23/2024 10:55 AM)  What is the patient's perception of their health status since discharge?: Same (7/23/2024 10:55  AM)  Is the patient/caregiver able to teach back the hierarchy of who to call/visit for symptoms/problems? PCP, Specialist, Home Health nurse, Urgent Care, ED, 911: Yes (7/23/2024 10:55 AM)    Wrap Up  Wrap Up Additional Comments: This CM spoke with patient via phone. Successful transition of care outreach complete. Pt reports doing well at home since discharge. New meds reviewed. Pt denies CP and SOB. Pt reports he still has the same chest discomfort when he exherts himself like going up stairs.Patient denies any further discharge questions/needs at this time. Emphasized that Follow up is needed after discharge to review the hospital recommendations, assess your response to your treatment.  Pt aware of my availability for non-emergent concerns. Contact info provided to patient. (7/23/2024 10:55 AM)

## 2024-07-23 NOTE — NURSING NOTE
Education given with discharge instruction.  Ivs removed.  Meds to bed at bedside with patient.  Belongings all with pt on discharge.  And Discharge papers given. Tele removed

## 2024-07-26 ENCOUNTER — LAB (OUTPATIENT)
Dept: LAB | Facility: LAB | Age: 64
End: 2024-07-26
Payer: COMMERCIAL

## 2024-07-26 DIAGNOSIS — E11.9 TYPE 2 DIABETES MELLITUS WITHOUT COMPLICATION, WITHOUT LONG-TERM CURRENT USE OF INSULIN (MULTI): ICD-10-CM

## 2024-07-26 DIAGNOSIS — I10 PRIMARY HYPERTENSION: ICD-10-CM

## 2024-07-26 DIAGNOSIS — E78.2 MIXED HYPERLIPIDEMIA: ICD-10-CM

## 2024-07-26 LAB
ALBUMIN SERPL BCP-MCNC: 4.5 G/DL (ref 3.4–5)
ALP SERPL-CCNC: 82 U/L (ref 33–136)
ALT SERPL W P-5'-P-CCNC: 35 U/L (ref 10–52)
ANION GAP SERPL CALC-SCNC: 12 MMOL/L (ref 10–20)
AST SERPL W P-5'-P-CCNC: 27 U/L (ref 9–39)
BILIRUB SERPL-MCNC: 1.2 MG/DL (ref 0–1.2)
BUN SERPL-MCNC: 11 MG/DL (ref 6–23)
CALCIUM SERPL-MCNC: 10 MG/DL (ref 8.6–10.3)
CHLORIDE SERPL-SCNC: 100 MMOL/L (ref 98–107)
CHOLEST SERPL-MCNC: 124 MG/DL (ref 0–199)
CHOLESTEROL/HDL RATIO: 4
CO2 SERPL-SCNC: 27 MMOL/L (ref 21–32)
CREAT SERPL-MCNC: 0.96 MG/DL (ref 0.5–1.3)
EGFRCR SERPLBLD CKD-EPI 2021: 89 ML/MIN/1.73M*2
EST. AVERAGE GLUCOSE BLD GHB EST-MCNC: 105 MG/DL
GLUCOSE SERPL-MCNC: 109 MG/DL (ref 74–99)
HBA1C MFR BLD: 5.3 %
HDLC SERPL-MCNC: 30.7 MG/DL
LDLC SERPL CALC-MCNC: 57 MG/DL
NON HDL CHOLESTEROL: 93 MG/DL (ref 0–149)
POTASSIUM SERPL-SCNC: 4 MMOL/L (ref 3.5–5.3)
PROT SERPL-MCNC: 7 G/DL (ref 6.4–8.2)
SODIUM SERPL-SCNC: 135 MMOL/L (ref 136–145)
TRIGL SERPL-MCNC: 182 MG/DL (ref 0–149)
VLDL: 36 MG/DL (ref 0–40)

## 2024-07-26 PROCEDURE — 80061 LIPID PANEL: CPT

## 2024-07-26 PROCEDURE — 83036 HEMOGLOBIN GLYCOSYLATED A1C: CPT

## 2024-07-26 PROCEDURE — 36415 COLL VENOUS BLD VENIPUNCTURE: CPT

## 2024-07-26 PROCEDURE — 80053 COMPREHEN METABOLIC PANEL: CPT

## 2024-07-30 ENCOUNTER — APPOINTMENT (OUTPATIENT)
Dept: PRIMARY CARE | Facility: CLINIC | Age: 64
End: 2024-07-30
Payer: COMMERCIAL

## 2024-07-30 VITALS
HEIGHT: 72 IN | DIASTOLIC BLOOD PRESSURE: 72 MMHG | SYSTOLIC BLOOD PRESSURE: 113 MMHG | TEMPERATURE: 97.9 F | WEIGHT: 237.9 LBS | BODY MASS INDEX: 32.22 KG/M2 | HEART RATE: 65 BPM | OXYGEN SATURATION: 95 %

## 2024-07-30 DIAGNOSIS — E11.9 TYPE 2 DIABETES MELLITUS WITHOUT COMPLICATION, WITHOUT LONG-TERM CURRENT USE OF INSULIN (MULTI): ICD-10-CM

## 2024-07-30 DIAGNOSIS — I10 PRIMARY HYPERTENSION: ICD-10-CM

## 2024-07-30 DIAGNOSIS — E78.2 MIXED HYPERLIPIDEMIA: ICD-10-CM

## 2024-07-30 DIAGNOSIS — Z09 HOSPITAL DISCHARGE FOLLOW-UP: Primary | ICD-10-CM

## 2024-07-30 DIAGNOSIS — E66.9 CLASS 1 OBESITY WITH SERIOUS COMORBIDITY AND BODY MASS INDEX (BMI) OF 32.0 TO 32.9 IN ADULT, UNSPECIFIED OBESITY TYPE: ICD-10-CM

## 2024-07-30 DIAGNOSIS — I25.10 ATHEROSCLEROSIS OF NATIVE CORONARY ARTERY OF NATIVE HEART WITHOUT ANGINA PECTORIS: ICD-10-CM

## 2024-07-30 DIAGNOSIS — D69.6 THROMBOCYTOPENIA (CMS-HCC): ICD-10-CM

## 2024-07-30 DIAGNOSIS — K70.30 ALCOHOLIC CIRRHOSIS OF LIVER WITHOUT ASCITES (MULTI): ICD-10-CM

## 2024-07-30 DIAGNOSIS — I26.99 PULMONARY EMBOLISM WITHOUT ACUTE COR PULMONALE, UNSPECIFIED CHRONICITY, UNSPECIFIED PULMONARY EMBOLISM TYPE (MULTI): ICD-10-CM

## 2024-07-30 PROCEDURE — 3008F BODY MASS INDEX DOCD: CPT | Performed by: FAMILY MEDICINE

## 2024-07-30 PROCEDURE — 4010F ACE/ARB THERAPY RXD/TAKEN: CPT | Performed by: FAMILY MEDICINE

## 2024-07-30 PROCEDURE — 3048F LDL-C <100 MG/DL: CPT | Performed by: FAMILY MEDICINE

## 2024-07-30 PROCEDURE — 99495 TRANSJ CARE MGMT MOD F2F 14D: CPT | Performed by: FAMILY MEDICINE

## 2024-07-30 PROCEDURE — 3061F NEG MICROALBUMINURIA REV: CPT | Performed by: FAMILY MEDICINE

## 2024-07-30 PROCEDURE — 3078F DIAST BP <80 MM HG: CPT | Performed by: FAMILY MEDICINE

## 2024-07-30 PROCEDURE — 3074F SYST BP LT 130 MM HG: CPT | Performed by: FAMILY MEDICINE

## 2024-07-30 PROCEDURE — 3044F HG A1C LEVEL LT 7.0%: CPT | Performed by: FAMILY MEDICINE

## 2024-07-30 ASSESSMENT — PATIENT HEALTH QUESTIONNAIRE - PHQ9
1. LITTLE INTEREST OR PLEASURE IN DOING THINGS: NOT AT ALL
2. FEELING DOWN, DEPRESSED OR HOPELESS: NOT AT ALL
SUM OF ALL RESPONSES TO PHQ9 QUESTIONS 1 AND 2: 0

## 2024-07-30 NOTE — PROGRESS NOTES
Subjective   Patient ID: Yuri Reynolds is a 64 y.o. male who presents for Hospital Follow-up and follow up monitoring and management of multiple medical conditions.      HPI       Patient was seen at the [facility] [place] [facility] after he developed some SOB and chest pain. Diagnosed with pulmonary embolism after testing.  He had a PE in his 30s after a heart cath was done.  No recent travel or extended period of inactivity. Denies any recent trauma.  Symptoms are improved overall, but still gets some exertional chest pressure.  Has stress test scheduled for 8/2/2024  Sees cardiologist on Monday, 8/5/2024.    He was given Eliquis 5 mg 2 tablets twice per day for 7 days, then 1 tablet twice per day for 23 days.         Currently denies any orthopnea, PND.    Discharge Facility:  [facility]   Discharge Diagnosis: Pulmonary embolism without acute cor pulmonale, unspecified chronicity, unspecified pulmonary embolism type (Multi)   Admission Date: 7/21/24  Discharge Date: 7/22/24  TCM outreach completed on : 7/23/24    Medication list was reviewed and reconciled.        Patient has hypertension.  Pt does not monitor BP readings at home.   Patient is compliant with his antihypertensive therapy and denies any noted side effects.  He follows up with a cardiologist.      He has hyperlipidemia.  Pt does try to limit the amount of fatty foods and high cholesterol foods that are consumed.  He is compliant with the dosing of his statin therapy and denies any noted side effects.     Patient has DM type 2.   Patient does try to limit the amount of carbohydrates that are consumed.  Denies any hypoglycemic symptoms or readings.  He continues to tolerate the medications well.  He states that he does regularly check his blood sugar.      Patient has coronary artery disease and follows with cardiology as well.  Has follow up with cardiology 8/5/2024.     He has cirrhosis of the liver.  Patient has continued to avoid  alcohol.  He was previously evaluated by hepatology, Dr. Hartmann, but has not followed up since October 2019.  He denies any jaundice or changes in stool/urine color.  Patient also denies any melena or hematochezia.     He has had some thrombocytopenia.  He denies any abnormal bruising or bleeding at this time.          Review of Systems  Constitutional: Patient denies any fever, chills, loss of appetite, or unexplained weight loss.  Cardiovascular: Patient denies any chest pain, shortness of breath with exertion, tachycardia, palpitations, orthopnea, or paroxysmal nocturnal dyspnea.  Respiratory: Patient denies any cough, shortness breath, or wheezing.  Gastrointestinal: Patient denies any nausea, vomiting, diarrhea, constipation, melena, hematochezia, or reflux symptoms  Skin: Denies any rashes or skin lesions  Neurology: Patient denies any new motor or sensory losses. Denies any numbness, tingling, weakness, and incoordination of the extremities. Patient also denies any tremor, seizures, or gait instability.  Endocrinology: Denies any polyuria, polydipsia, polyphagia, or heat/cold intolerance.      Objective   /72   Pulse 65   Temp 36.6 °C (97.9 °F) (Temporal)   Ht 1.829 m (6')   Wt 108 kg (237 lb 14.4 oz)   SpO2 95%   BMI 32.27 kg/m²     Physical Exam  General Appearance: Alert and cooperative, in no acute distress, well-developed/well-nourished, obese male.     Neck: Supple and without adenopathy or rigidity. There is no JVD at 90° and no carotid bruits are noted. There is no thyromegaly, thyroid tenderness, or palpable thyroid nodules.  Heart: Regular rate and rhythm without murmur or ectopy.  Lungs: Clear to auscultation bilaterally with good air exchange.  Skin: Good turgor, moist, warm and without rashes or lesions.  Neurological exam: Alert and oriented ×3, no tremor, normal gait.  Extremities: No clubbing, cyanosis, or edema  Psychiatric: Appropriate mood and affect, good insight and judgment,  no delusions or thought disorders, no suicidal or homicidal ideation    Assessment/Plan     Hospital Discharge Follow up:  He was diagnosed with a PE after a CT scan and XR in the ER.  He discharged the following day and was prescribe Eliquis.   He has had a PE before in his 30s after his second heart attack.    Pulmonary Embolism:  Will refer him to Hematology for further evaluation due to recurrent blood clots.   He has an appointment to see Dr. Birmingham on 8/5/2024.  He will continue the Eliquis.    HTN:  Blood pressure appears adequately controlled and we will continue with the current antihypertensive therapy.    Hyperlipidemia: Patient will continue with the current medication.  Dietary changes, exercise, and maintenance of healthy weight were discussed at length.    Diabetes mellitus type 2:   His most recent labs reveal hemoglobin A1c was:  Lab Results   Component Value Date    HGBA1C 5.3 07/26/2024   Patient was advised to have a diabetic eye exam annually.  Patient was also advised to check the feet on a regular basis.     CAD: Stable based on symptoms.   He is without any worrisome signs or symptoms for unstable angina.  Risk factor modification was discussed.  He will continue follow-up with cardiology as well.     Cirrhosis / Hx of alcohol abuse:   He has continued to abstain from alcohol.  He has delayed follow up with hepatology and has not scheduled the Fibroscan ordered by hepatology.  Pt has unfortunately delayed follow up and testing per hepatology.     Thrombocytopenia: Stable based on most recent lab evaluation.   We will continue to monitor.  Denies any abnormal bruising or bleeding.  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      Obesity: Dietary changes, exercise, and maintenance of a healthy weight were discussed at length.  Goal is to achieve a BMI less than 25.     Dermatitis:  Stable on as needed topical  triamcinolone.  Continue current plan.      PSA due 12/7/2024  COLONOSCOPY DUE 3/7/2022     Follow up in 3 months.      Scribe Attestation  By signing my name below, I, [employer]   attest that this documentation has been prepared under the direction and in the presence of Marc Harding DO.     Orders Placed This Encounter   Procedures    Hemoglobin A1C    Basic Metabolic Panel    Referral to Hematology and Oncology     Requested Prescriptions     Signed Prescriptions Disp Refills    apixaban (Eliquis) 5 mg tablet 180 tablet 0     Sig: Take 1 tablet (5 mg) by mouth 2 times a day.

## 2024-07-30 NOTE — PATIENT INSTRUCTIONS
We have referred you to hematology for additional evaluation.    Follow up in 3 months with labs to be done PRIOR.    It was a pleasure to see you today. Thank you for choosing us for your health care needs.    If you have lab or other testing completed and have not been informed of results within one week, please call the office for your results.    If you haven't done so, consider signing up for [facility], the [facility] personal health record. Ask the staff how you can get started.

## 2024-08-02 ENCOUNTER — HOSPITAL ENCOUNTER (OUTPATIENT)
Dept: RADIOLOGY | Facility: CLINIC | Age: 64
Discharge: HOME | End: 2024-08-02
Payer: COMMERCIAL

## 2024-08-02 ENCOUNTER — HOSPITAL ENCOUNTER (OUTPATIENT)
Dept: CARDIOLOGY | Facility: CLINIC | Age: 64
Discharge: HOME | End: 2024-08-02
Payer: COMMERCIAL

## 2024-08-02 DIAGNOSIS — Z98.61 HISTORY OF PTCA: ICD-10-CM

## 2024-08-02 DIAGNOSIS — I25.119 CORONARY ARTERY DISEASE INVOLVING NATIVE CORONARY ARTERY OF NATIVE HEART WITH ANGINA PECTORIS (CMS-HCC): ICD-10-CM

## 2024-08-02 DIAGNOSIS — I25.2 PAST MYOCARDIAL INFARCTION: ICD-10-CM

## 2024-08-02 DIAGNOSIS — I10 PRIMARY HYPERTENSION: Primary | ICD-10-CM

## 2024-08-02 DIAGNOSIS — I10 PRIMARY HYPERTENSION: ICD-10-CM

## 2024-08-02 DIAGNOSIS — E78.49 OTHER HYPERLIPIDEMIA: ICD-10-CM

## 2024-08-02 DIAGNOSIS — Z98.61 STATUS POST PERCUTANEOUS TRANSLUMINAL CORONARY ANGIOPLASTY: ICD-10-CM

## 2024-08-02 DIAGNOSIS — Z98.890 HISTORY OF CARDIAC CATHETERIZATION: ICD-10-CM

## 2024-08-02 DIAGNOSIS — I25.10 ATHEROSCLEROSIS OF NATIVE CORONARY ARTERY OF NATIVE HEART WITHOUT ANGINA PECTORIS: ICD-10-CM

## 2024-08-02 DIAGNOSIS — E78.2 MIXED HYPERLIPIDEMIA: ICD-10-CM

## 2024-08-02 PROCEDURE — 78452 HT MUSCLE IMAGE SPECT MULT: CPT

## 2024-08-02 PROCEDURE — 3430000001 HC RX 343 DIAGNOSTIC RADIOPHARMACEUTICALS: Performed by: NURSE PRACTITIONER

## 2024-08-02 PROCEDURE — A9502 TC99M TETROFOSMIN: HCPCS | Performed by: NURSE PRACTITIONER

## 2024-08-02 PROCEDURE — 93017 CV STRESS TEST TRACING ONLY: CPT

## 2024-08-02 PROCEDURE — 2500000004 HC RX 250 GENERAL PHARMACY W/ HCPCS (ALT 636 FOR OP/ED): Performed by: NURSE PRACTITIONER

## 2024-08-02 RX ORDER — REGADENOSON 0.08 MG/ML
0.4 INJECTION, SOLUTION INTRAVENOUS ONCE
Status: COMPLETED | OUTPATIENT
Start: 2024-08-02 | End: 2024-08-02

## 2024-08-05 ENCOUNTER — APPOINTMENT (OUTPATIENT)
Dept: CARDIOLOGY | Facility: CLINIC | Age: 64
End: 2024-08-05
Payer: COMMERCIAL

## 2024-08-05 ENCOUNTER — TELEPHONE (OUTPATIENT)
Dept: CARDIOLOGY | Facility: CLINIC | Age: 64
End: 2024-08-05

## 2024-08-05 VITALS
DIASTOLIC BLOOD PRESSURE: 60 MMHG | SYSTOLIC BLOOD PRESSURE: 112 MMHG | BODY MASS INDEX: 32.64 KG/M2 | WEIGHT: 241 LBS | HEIGHT: 72 IN

## 2024-08-05 DIAGNOSIS — I25.119 CORONARY ARTERY DISEASE INVOLVING NATIVE CORONARY ARTERY OF NATIVE HEART WITH ANGINA PECTORIS (CMS-HCC): Primary | ICD-10-CM

## 2024-08-05 DIAGNOSIS — Z86.711 HISTORY OF PULMONARY EMBOLISM: ICD-10-CM

## 2024-08-05 DIAGNOSIS — I25.10 ATHEROSCLEROSIS OF NATIVE CORONARY ARTERY OF NATIVE HEART WITHOUT ANGINA PECTORIS: ICD-10-CM

## 2024-08-05 DIAGNOSIS — E11.9 TYPE 2 DIABETES MELLITUS WITHOUT COMPLICATION, WITHOUT LONG-TERM CURRENT USE OF INSULIN (MULTI): ICD-10-CM

## 2024-08-05 DIAGNOSIS — R06.02 SHORTNESS OF BREATH: ICD-10-CM

## 2024-08-05 DIAGNOSIS — I25.2 PAST MYOCARDIAL INFARCTION: ICD-10-CM

## 2024-08-05 DIAGNOSIS — I10 PRIMARY HYPERTENSION: ICD-10-CM

## 2024-08-05 DIAGNOSIS — E78.49 OTHER HYPERLIPIDEMIA: ICD-10-CM

## 2024-08-05 DIAGNOSIS — Z98.61 HISTORY OF PTCA: ICD-10-CM

## 2024-08-05 PROCEDURE — 3074F SYST BP LT 130 MM HG: CPT | Performed by: INTERNAL MEDICINE

## 2024-08-05 PROCEDURE — 99215 OFFICE O/P EST HI 40 MIN: CPT | Performed by: INTERNAL MEDICINE

## 2024-08-05 PROCEDURE — 3048F LDL-C <100 MG/DL: CPT | Performed by: INTERNAL MEDICINE

## 2024-08-05 PROCEDURE — 3008F BODY MASS INDEX DOCD: CPT | Performed by: INTERNAL MEDICINE

## 2024-08-05 PROCEDURE — 3044F HG A1C LEVEL LT 7.0%: CPT | Performed by: INTERNAL MEDICINE

## 2024-08-05 PROCEDURE — 4010F ACE/ARB THERAPY RXD/TAKEN: CPT | Performed by: INTERNAL MEDICINE

## 2024-08-05 PROCEDURE — 3078F DIAST BP <80 MM HG: CPT | Performed by: INTERNAL MEDICINE

## 2024-08-05 PROCEDURE — 3061F NEG MICROALBUMINURIA REV: CPT | Performed by: INTERNAL MEDICINE

## 2024-08-05 RX ORDER — ISOSORBIDE MONONITRATE 30 MG/1
30 TABLET, EXTENDED RELEASE ORAL DAILY
Qty: 30 TABLET | Refills: 3 | Status: SHIPPED | OUTPATIENT
Start: 2024-08-05 | End: 2024-08-08

## 2024-08-05 RX ORDER — NITROGLYCERIN 0.4 MG/1
0.4 TABLET SUBLINGUAL EVERY 5 MIN PRN
Qty: 90 TABLET | Refills: 1 | Status: SHIPPED | OUTPATIENT
Start: 2024-08-05 | End: 2025-08-05

## 2024-08-05 NOTE — PROGRESS NOTES
Patient:  Yuri Reynolds  YOB: 1960  MRN: [id number]       HPI:       Yuri Reynolds is a 64 y.o. male who returns today for cardiac follow-up.  He has a history of atherosclerotic heart disease with remote inferior wall myocardial infarction 1999. He had an apical myocardial infarction in August 2003. He underwent angioplasty and stenting of the right coronary artery in 1999 and in 2003. A myocardial perfusion study in April 2015 was negative for ischemia with calculated LV ejection fraction of 65%. He has a history of essential hypertension, type 2 diabetes mellitus, and hyperlipidemia. He stopped smoking tobacco back in January 2017.      He previously noted some intermittent palpitations and a Holter monitor showed normal sinus rhythm with occasional PACs and PVCs. His symptoms correlated with the ectopy. He also had some exertional shortness breath in the setting of progressive weight gain. Extensive pulmonary evaluation was unremarkable. An echocardiogram in August 2017 showed normal LV systolic function and normal valvular structure and function. He was treated in 2019 for a bowel obstruction. He was managed conservatively and it resolved. In the process he was diagnosed with cirrhosis of the liver. He was drinking quite heavily at the time but has completely stopped using any alcohol. He also stopped smoking.      He was seen in the office April 10, 2024 and complained of some exertional shortness of breath, fatigue, and diaphoresis.  He was scheduled for an echocardiogram and Lexiscan myocardial perfusion study.  There were some delays in scheduling.  He was seen in consultation at [facility] on July 22, 2024.  He reported ongoing shortness of breath with lesser amounts of exertion.  He was also experiencing intermittent pressure discomfort in his upper chest over the previous few weeks.  He states on at least 2 occasions the chest pressure was quite intense and prolonged.  At 1 point he thought he  might be having a heart attack so he decided to come to the emergency department.  Initial EKG showed normal sinus rhythm with some lateral nonspecific ST-T wave changes.  Repeat EKG was normal.  High-sensitivity troponin levels 4 and 4.  CBC and CMP normal with exception of sodium 135.  CT of the chest showed nonenhancement of right lower lobe subsegmental branches suspicious for pulmonary emboli.  He was placed on a heparin drip and admitted to telemetry.  This was subsequently transitioned to [facility].   He denied any recent travel.  He had been active without limitations.  No recent trauma.      He notes intermittent exertional chest discomfort since his discharge.  A Lexiscan myocardial perfusion study August 2, 2024 showed a moderate inferoapical myocardial infarction.  No myocardial ischemia.  Calculated LV ejection fraction of 64%.  He does have a prior history of apical myocardial infarction 1999 and inferior myocardial infarction in 2003.  Echocardiogram July 22, 2024 showed an estimated LV ejection action 55 to 60% with mild concentric left ventricular hypertrophy.  Trivial to 1+ tricuspid regurgitation with estimated RVSP 23 mmHg.      He states he has gone back to work.  He is not doing any heavy lifting.  He feels better but mostly because he has been curtailing his activity to a large extent.  He notes that if he overdoes it he does feel a pressure-like discomfort in his upper chest radiating into both jaws.  He also notes some discomfort in the back of his neck, discomfort going down into both forearms, as well as some discomfort in the mid upper abdomen.  No other exacerbating or relieving factors.  He has not tried nitroglycerin.  He was previously prescribed Imdur but did not yet started.  He denies any chest pain or shortness of breath at rest.    He denies any orthopnea, PND, or increasing peripheral edema.  He denies any palpitations, lightheadedness, near-syncope, or syncope.  He denies any  fever, chills, or cough.  He denies any nausea or vomiting.  Lab studies July 26, 2024 showed a cholesterol 124 with HDL 31, LDL 57, and triglycerides 182.  Comprehensive metabolic profile was normal with exception of sodium 135.  Hemoglobin A1c 5.3.  CBC on July 22, 2024 was normal.  BNP was 18.  High-sensitivity troponin level 4.    I spoke at length with Rodolfo regarding his symptoms of chest discomfort.  Overall pattern is most suggestive of exertional angina pectoris.  It is encouraging that his perfusion study did not show any significant myocardial ischemia.  Normal LV systolic function.  Recent normal troponin level despite prolonged discomfort.  I would prefer to avoid holding anticoagulation for repeat cardiac cath at this point in time if at all possible in light of recent pulmonary embolus.  I advised him to start on Imdur 30 mg daily and titrate this up to 60 mg after 1 week.  He was advised on the use of as needed sublingual nitroglycerin and need to seek medical attention immediately should he develop any new or worsening symptoms.  He will be seen in follow-up in approximately 4 weeks.  Low threshold to proceed with cardiac cath if his symptoms do not improve or progress.      The patient has Viagra listed as one of his medications.  He states he is only taken this 2 or 3 times in the past several months.  He does not plan on taking more doses at this time.  He is well aware of the absolute need to avoid a combination of nitrates and Viagra.  Other details as noted below.     The above portion of this note was dictated by me using voice recognition software. I personally performed the services described in the documentation. The scribe entering the documentation below was in my presence. I affirm that the information is both accurate and complete.       Objective:     Vitals:    08/05/24 1544   BP: 112/60       Wt Readings from Last 4 Encounters:   07/30/24 108 kg (237 lb 14.4 oz)   07/22/24 108 kg  (238 lb 8.6 oz)   04/10/24 109 kg (240 lb)   03/18/24 111 kg (245 lb 4.8 oz)       Allergies:     No Known Allergies       Medications:     Current Outpatient Medications   Medication Instructions    apixaban (ELIQUIS) 5 mg, oral, 2 times daily    aspirin 81 mg EC tablet 1 tablet, oral, Daily    isosorbide mononitrate ER (IMDUR) 30 mg, oral, Daily, DO NOT CRUSH OR CHEW    lisinopril 10 mg, oral, Daily    metFORMIN XR (GLUCOPHAGE-XR) 500 mg, oral, Daily with evening meal    metoprolol succinate XL (TOPROL-XL) 50 mg, oral, Daily    rosuvastatin (CRESTOR) 40 mg, oral, Daily    sildenafil (Viagra) 100 mg tablet Take 1 tablet 30-60 min before intercourse.    triamcinolone (Kenalog) 0.1 % cream 1 Application, Topical, 2 times daily PRN, Monday - Friday only       Physical Examination:   GENERAL:  Well developed, well nourished, in no acute distress.  CHEST:  Symmetric and nontender.  NEURO/PSYCH:  Alert and oriented times three with approppriate behavior and responses.  NECK:  Supple, no JVD, no bruit.  LUNGS:  Clear to auscultation bilaterally, normal respiratory effort.  HEART:  Rate and rhythm regular with no evident murmur, no gallop appreciated.        There are no rubs, clicks or heaves.  EXTREMITIES:  Warm with good color, no clubbing or cyanosis.  There is no edema noted.  PERIPHERAL VASCULAR:  Pulses present and equally palpable; 2+ throughout.      Lab:     CBC:   Lab Results   Component Value Date    WBC 4.5 07/22/2024    RBC 5.02 07/22/2024    HGB 14.2 07/22/2024    HCT 41.6 07/22/2024    PLT 64 (L) 07/22/2024        CMP:    Lab Results   Component Value Date     (L) 07/26/2024    K 4.0 07/26/2024     07/26/2024    CO2 27 07/26/2024    BUN 11 07/26/2024    CREATININE 0.96 07/26/2024    GLUCOSE 109 (H) 07/26/2024    CALCIUM 10.0 07/26/2024       Lipid Profile:    Lab Results   Component Value Date    TRIG 182 (H) 07/26/2024    HDL 30.7 07/26/2024    LDLCALC 57 07/26/2024       BMP:  Lab Results    Component Value Date     (L) 07/26/2024     (L) 07/22/2024     (L) 07/21/2024    K 4.0 07/26/2024    K 3.9 07/22/2024    K 3.9 07/21/2024     07/26/2024     07/22/2024     07/21/2024    CO2 27 07/26/2024    CO2 24 07/22/2024    CO2 24 07/21/2024    BUN 11 07/26/2024    BUN 16 07/22/2024    BUN 16 07/21/2024    CREATININE 0.96 07/26/2024    CREATININE 0.89 07/22/2024    CREATININE 1.05 07/21/2024       CBC:  Lab Results   Component Value Date    WBC 4.5 07/22/2024    WBC 6.2 07/21/2024    WBC 5.6 12/15/2022    WBC 4.6 05/18/2022    WBC 5.8 04/13/2022    RBC 5.02 07/22/2024    RBC 5.49 07/21/2024    RBC 5.40 12/15/2022    RBC 5.37 05/18/2022    RBC 5.43 04/13/2022    HGB 14.2 07/22/2024    HGB 15.6 07/21/2024    HGB 15.3 12/15/2022    HGB 15.3 05/18/2022    HGB 15.6 04/13/2022    HCT 41.6 07/22/2024    HCT 45.5 07/21/2024    HCT 46.0 12/15/2022    HCT 46.4 05/18/2022    HCT 46.6 04/13/2022    MCV 83 07/22/2024    MCV 83 07/21/2024    MCV 85 12/15/2022    MCV 86 05/18/2022    MCV 86 04/13/2022    MCH 28.3 07/22/2024    MCH 28.4 07/21/2024    MCHC 34.1 07/22/2024    MCHC 34.3 07/21/2024    MCHC 33.3 12/15/2022    MCHC 33.0 05/18/2022    MCHC 33.5 04/13/2022    RDW 13.1 07/22/2024    RDW 13.1 07/21/2024    RDW 13.6 12/15/2022    RDW 13.2 05/18/2022    RDW 13.3 04/13/2022    PLT 64 (L) 07/22/2024    PLT 81 (L) 07/21/2024    PLT 84 (L) 12/15/2022    PLT 88 (L) 05/18/2022    PLT 85 (L) 04/13/2022       Hepatic Function Panel:    Lab Results   Component Value Date    ALKPHOS 82 07/26/2024    ALT 35 07/26/2024    AST 27 07/26/2024    PROT 7.0 07/26/2024    BILITOT 1.2 07/26/2024    BILIDIR 0.1 10/01/2020       HgBA1c:    Lab Results   Component Value Date    HGBA1C 5.3 07/26/2024       TSH:    Lab Results   Component Value Date    TSH 3.11 02/12/2019       BNP:   Lab Results   Component Value Date    BNP 18 07/21/2024        PT/INR:    Lab Results   Component Value Date    PROTIME 13.5  (H) 10/01/2020    INR 1.2 (H) 10/01/2020       Cardiac Enzymes:    Lab Results   Component Value Date    TROPHS 4 07/21/2024    TROPHS 4 07/21/2024         Diagnostic Studies:     Nuclear Stress Test  Result Date: 8/2/2024    Abnormal Lexiscan Myoview cardiac perfusion stress test. No Myocardial ischemia by perfusion imaging. Moderate inferior apical from apex to mid base myocardial infarction by perfusion imaging. Normal left ventricular systolic function. Left ventricular ejection fraction 64 %. The above study is abnormal. Does display a moderate-sized inferior defect from inferior apical region into the mid base region. No ischemia seen. Global wall motion and function are normal. Patient does have a clinical history of MI in 1999 in 2003 and which may account for this. The prior nuclear scan however done in 2016 was interpreted as normal. Clinical correlation is advised..     Signed by: Omkar Newman 8/2/2024 11:49 AM Dictation workstation:   [id number]      Problem List:     Patient Active Problem List   Diagnosis    Abnormal LFTs    Coronary artery disease involving native coronary artery of native heart with angina pectoris (CMS-HCC)    Bilateral sensorineural hearing loss    Chronic nasal congestion    Cirrhosis (Multi)    Cough    Dizziness    Elevated serum creatinine    Eustachian tube dysfunction    Fatty liver    Hearing loss, bilateral    History of PTCA    Hyperlipidemia    Hypersplenism    Hypertension    Inability to maintain erection    Insomnia    Memory changes    Nicotine dependence    Palpitations    Past myocardial infarction    Retained myringotomy tube    Small bowel obstruction (Multi)    Somnolence, daytime    Thrombocytopenia (CMS-HCC)    Type 2 diabetes mellitus without complication, without long-term current use of insulin (Multi)    History of alcohol abuse    Class 1 obesity with serious comorbidity and body mass index (BMI) of 33.0 to 33.9 in adult    Abnormal finding in urine     Alcohol abuse    Anal discharge    Bursitis of elbow    Difficulty breathing    Dysfunction of both eustachian tubes    History of cardiac catheterization    History of ear disorder    History of pulmonary embolism    Inflammatory dermatosis    Lateral epicondylitis of both elbows    Portal hypertension (Multi)    Tinnitus, right ear    Pulmonary embolism without acute cor pulmonale, unspecified chronicity, unspecified pulmonary embolism type (Multi)    Chest pain       Asessment:     Problem List Items Addressed This Visit             ICD-10-CM    Coronary artery disease involving native coronary artery of native heart with angina pectoris (CMS-HCC) - Primary I25.119    Relevant Medications    isosorbide mononitrate ER (Imdur) 30 mg 24 hr tablet    nitroglycerin (Nitrostat) 0.4 mg SL tablet    Other Relevant Orders    Follow Up In Cardiology    History of PTCA Z98.61    Relevant Medications    isosorbide mononitrate ER (Imdur) 30 mg 24 hr tablet    Other Relevant Orders    Follow Up In Cardiology    Hyperlipidemia E78.5    Relevant Orders    Follow Up In Cardiology    Hypertension I10    Relevant Orders    Follow Up In Cardiology    Past myocardial infarction I25.2    Relevant Medications    nitroglycerin (Nitrostat) 0.4 mg SL tablet    Other Relevant Orders    Follow Up In Cardiology    Type 2 diabetes mellitus without complication, without long-term current use of insulin (Multi) E11.9    Relevant Orders    Follow Up In Cardiology    History of pulmonary embolism Z86.711    Relevant Orders    Follow Up In Cardiology     Other Visit Diagnoses         Codes    Atherosclerosis of native coronary artery of native heart without angina pectoris     I25.10    Relevant Medications    isosorbide mononitrate ER (Imdur) 30 mg 24 hr tablet    nitroglycerin (Nitrostat) 0.4 mg SL tablet    Other Relevant Orders    Follow Up In Cardiology    Shortness of breath     R06.02    Relevant Medications    isosorbide mononitrate ER  (Imdur) 30 mg 24 hr tablet    Other Relevant Orders    Follow Up In Cardiology

## 2024-08-05 NOTE — TELEPHONE ENCOUNTER
----- Message from Kip Birmingham sent at 8/2/2024  4:52 PM EDT -----  Stress test reviewed and looks fine.  Normal blood flow to the heart.  Area of scar tissue related to prior heart attack.  Normal pumping function.  Nothing to suggest any significant progression of heart artery blockage.  Continue same and follow-up as scheduled.  Thanks.

## 2024-08-05 NOTE — LETTER
August 5, 2024     Patient: Yuri Reynolds   YOB: 1960   Date of Visit: 8/5/2024       To Whom It May Concern:    Yuri Reynolds was seen in my clinic on 8/5/2024 at 3:45 pm. Please excuse Yuri for his absence from work on this day to make the appointment. Patient is schedule to return to work with a 10-15 weight restriction.     If you have any questions or concerns, please don't hesitate to call.         Sincerely,         Kip Birmingham MD        CC: No Recipients

## 2024-08-05 NOTE — PATIENT INSTRUCTIONS
PLEASE BRING ALL MEDICATIONS BOTTLES TO OFFICE VISITS.     START: IMDUR 30 MG- TAKE 1 TABLET DAILY   START: Nitroglycerin 0.4 MG- TAKE as directed as needed

## 2024-08-07 ENCOUNTER — PATIENT OUTREACH (OUTPATIENT)
Dept: CARE COORDINATION | Facility: CLINIC | Age: 64
End: 2024-08-07
Payer: COMMERCIAL

## 2024-08-07 NOTE — PROGRESS NOTES
Call regarding appt. with PCP on 7/30/24 after hospitalization.  At time of outreach call the patient feels as if their condition has improved since last visit. Pt denies chest discomfort at this time and has all of his prescribed medications.  Reviewed the PCP appointment with the pt and addressed any questions or concerns.

## 2024-08-08 ENCOUNTER — PATIENT MESSAGE (OUTPATIENT)
Dept: PRIMARY CARE | Facility: CLINIC | Age: 64
End: 2024-08-08
Payer: COMMERCIAL

## 2024-08-08 DIAGNOSIS — R06.02 SOB (SHORTNESS OF BREATH): Primary | ICD-10-CM

## 2024-08-13 RX ORDER — ALBUTEROL SULFATE 90 UG/1
2 INHALANT RESPIRATORY (INHALATION) EVERY 6 HOURS PRN
Qty: 18 G | Refills: 0 | Status: SHIPPED | OUTPATIENT
Start: 2024-08-13 | End: 2025-08-13

## 2024-08-28 ENCOUNTER — PREP FOR PROCEDURE (OUTPATIENT)
Dept: CARDIOLOGY | Facility: CLINIC | Age: 64
End: 2024-08-28

## 2024-08-28 ENCOUNTER — LAB (OUTPATIENT)
Dept: LAB | Facility: LAB | Age: 64
End: 2024-08-28
Payer: COMMERCIAL

## 2024-08-28 ENCOUNTER — OFFICE VISIT (OUTPATIENT)
Dept: CARDIOLOGY | Facility: CLINIC | Age: 64
End: 2024-08-28
Payer: COMMERCIAL

## 2024-08-28 VITALS
HEIGHT: 72 IN | DIASTOLIC BLOOD PRESSURE: 76 MMHG | WEIGHT: 241 LBS | HEART RATE: 64 BPM | BODY MASS INDEX: 32.64 KG/M2 | SYSTOLIC BLOOD PRESSURE: 127 MMHG

## 2024-08-28 DIAGNOSIS — I20.0 ANGINA PECTORIS, UNSTABLE (MULTI): Primary | ICD-10-CM

## 2024-08-28 DIAGNOSIS — I25.119 CORONARY ARTERY DISEASE INVOLVING NATIVE CORONARY ARTERY OF NATIVE HEART WITH ANGINA PECTORIS (CMS-HCC): Primary | ICD-10-CM

## 2024-08-28 DIAGNOSIS — Z98.61 HISTORY OF PTCA: ICD-10-CM

## 2024-08-28 DIAGNOSIS — E78.49 OTHER HYPERLIPIDEMIA: ICD-10-CM

## 2024-08-28 DIAGNOSIS — I10 PRIMARY HYPERTENSION: ICD-10-CM

## 2024-08-28 DIAGNOSIS — I25.2 PAST MYOCARDIAL INFARCTION: ICD-10-CM

## 2024-08-28 DIAGNOSIS — F17.219 CIGARETTE NICOTINE DEPENDENCE WITH NICOTINE-INDUCED DISORDER: ICD-10-CM

## 2024-08-28 DIAGNOSIS — I20.0 ANGINA PECTORIS, UNSTABLE (MULTI): ICD-10-CM

## 2024-08-28 DIAGNOSIS — E11.9 TYPE 2 DIABETES MELLITUS WITHOUT COMPLICATION, WITHOUT LONG-TERM CURRENT USE OF INSULIN (MULTI): ICD-10-CM

## 2024-08-28 DIAGNOSIS — Z86.711 HISTORY OF PULMONARY EMBOLISM: ICD-10-CM

## 2024-08-28 LAB
ANION GAP SERPL CALC-SCNC: 12 MMOL/L (ref 10–20)
BUN SERPL-MCNC: 17 MG/DL (ref 6–23)
CALCIUM SERPL-MCNC: 9.4 MG/DL (ref 8.6–10.3)
CHLORIDE SERPL-SCNC: 104 MMOL/L (ref 98–107)
CO2 SERPL-SCNC: 24 MMOL/L (ref 21–32)
CREAT SERPL-MCNC: 0.95 MG/DL (ref 0.5–1.3)
EGFRCR SERPLBLD CKD-EPI 2021: 90 ML/MIN/1.73M*2
ERYTHROCYTE [DISTWIDTH] IN BLOOD BY AUTOMATED COUNT: 13.6 % (ref 11.5–14.5)
GLUCOSE SERPL-MCNC: 74 MG/DL (ref 74–99)
HCT VFR BLD AUTO: 41.6 % (ref 41–52)
HGB BLD-MCNC: 14.1 G/DL (ref 13.5–17.5)
MCH RBC QN AUTO: 28.4 PG (ref 26–34)
MCHC RBC AUTO-ENTMCNC: 33.9 G/DL (ref 32–36)
MCV RBC AUTO: 84 FL (ref 80–100)
NRBC BLD-RTO: 0 /100 WBCS (ref 0–0)
PLATELET # BLD AUTO: 81 X10*3/UL (ref 150–450)
POTASSIUM SERPL-SCNC: 4.1 MMOL/L (ref 3.5–5.3)
RBC # BLD AUTO: 4.96 X10*6/UL (ref 4.5–5.9)
SODIUM SERPL-SCNC: 136 MMOL/L (ref 136–145)
WBC # BLD AUTO: 5.4 X10*3/UL (ref 4.4–11.3)

## 2024-08-28 PROCEDURE — 3048F LDL-C <100 MG/DL: CPT | Performed by: INTERNAL MEDICINE

## 2024-08-28 PROCEDURE — 3008F BODY MASS INDEX DOCD: CPT | Performed by: INTERNAL MEDICINE

## 2024-08-28 PROCEDURE — 3074F SYST BP LT 130 MM HG: CPT | Performed by: INTERNAL MEDICINE

## 2024-08-28 PROCEDURE — 36415 COLL VENOUS BLD VENIPUNCTURE: CPT

## 2024-08-28 PROCEDURE — 3044F HG A1C LEVEL LT 7.0%: CPT | Performed by: INTERNAL MEDICINE

## 2024-08-28 PROCEDURE — 3078F DIAST BP <80 MM HG: CPT | Performed by: INTERNAL MEDICINE

## 2024-08-28 PROCEDURE — 1036F TOBACCO NON-USER: CPT | Performed by: INTERNAL MEDICINE

## 2024-08-28 PROCEDURE — 85027 COMPLETE CBC AUTOMATED: CPT

## 2024-08-28 PROCEDURE — 3061F NEG MICROALBUMINURIA REV: CPT | Performed by: INTERNAL MEDICINE

## 2024-08-28 PROCEDURE — 99215 OFFICE O/P EST HI 40 MIN: CPT | Performed by: INTERNAL MEDICINE

## 2024-08-28 PROCEDURE — 80048 BASIC METABOLIC PNL TOTAL CA: CPT

## 2024-08-28 PROCEDURE — 4010F ACE/ARB THERAPY RXD/TAKEN: CPT | Performed by: INTERNAL MEDICINE

## 2024-08-28 NOTE — H&P (VIEW-ONLY)
Patient:  Yuri Reynolds  YOB: 1960  MRN: [id number]       HPI:       Yuri Reynolds is a 64 y.o. male who returns today for cardiac follow-up.    He has a history of atherosclerotic heart disease with remote inferior wall myocardial infarction 1999. He had an apical myocardial infarction in August 2003. He underwent angioplasty and stenting of the right coronary artery in 1999 and in 2003. A myocardial perfusion study in April 2015 was negative for ischemia with calculated LV ejection fraction of 65%. He has a history of essential hypertension, type 2 diabetes mellitus, and hyperlipidemia. He stopped smoking tobacco back in January 2017.      He previously noted some intermittent palpitations and a Holter monitor showed normal sinus rhythm with occasional PACs and PVCs. His symptoms correlated with the ectopy. He also had some exertional shortness breath in the setting of progressive weight gain. Extensive pulmonary evaluation was unremarkable. An echocardiogram in August 2017 showed normal LV systolic function and normal valvular structure and function. He was treated in 2019 for a bowel obstruction. He was managed conservatively and it resolved. In the process he was diagnosed with cirrhosis of the liver. He was drinking quite heavily at the time but has completely stopped using any alcohol. He also stopped smoking.       He was seen in the office April 10, 2024 and complained of some exertional shortness of breath, fatigue, and diaphoresis.  He was scheduled for an echocardiogram and Lexiscan myocardial perfusion study.  There were some delays in scheduling.  He was seen in consultation at [facility] on July 22, 2024.  He reported ongoing shortness of breath with lesser amounts of exertion.  He was also experiencing intermittent pressure discomfort in his upper chest over the previous few weeks.  He states on at least 2 occasions the chest pressure was quite intense and prolonged.  At 1 point he thought  he might be having a heart attack so he decided to come to the emergency department.  Initial EKG showed normal sinus rhythm with some lateral nonspecific ST-T wave changes.  Repeat EKG was normal.  High-sensitivity troponin levels 4 and 4.  CBC and CMP normal with exception of sodium 135.  CT of the chest showed nonenhancement of right lower lobe subsegmental branches suspicious for pulmonary emboli.  He was placed on a heparin drip and admitted to telemetry.  This was subsequently transitioned to [facility].   He denied any recent travel.  He had been active without limitations.  No recent trauma.       At the time of his office visit August 5, 2024 he noted intermittent exertional chest discomfort.  A Lexiscan myocardial perfusion study August 2, 2024 showed a moderate inferoapical myocardial infarction.  No myocardial ischemia.  Calculated LV ejection fraction of 64%.  He does have a prior history of apical myocardial infarction 1999 and inferior myocardial infarction in 2003.  Echocardiogram July 22, 2024 showed an estimated LV ejection fraction 55 to 60% with mild concentric left ventricular hypertrophy.  Trivial to 1+ tricuspid regurgitation with estimated RVSP 23 mmHg.       He has gone back to work but continues to avoid any heavy lifting.  He describes 3 different types of chest discomfort.  He again notes that if he overdoes it he does feel a pressure-like discomfort in his upper chest and this radiates into both jaws.  He also notes some discomfort in the back of his neck, discomfort going down into both forearms, as well as some discomfort in the mid upper abdomen.  No other exacerbating or relieving factors.  The symptoms have improved somewhat since he started on isosorbide mononitrate but he still is needed to significantly curtail his activities.  He describes some burning discomfort in his lower chest and upper abdomen when he wakes up about 5 AM to go to the bathroom.  This is different than the  symptoms described above.  He also notes some burning discomfort in his upper chest at times when he is eating.  He denies any shortness of breath at rest.    He denies any orthopnea, PND, or increasing peripheral edema.  He denies any palpitations, lightheadedness, near-syncope, or syncope.  He denies any fever, chills, or cough.  He denies any nausea or vomiting.  Lab studies July 26, 2024 showed a cholesterol 124 with HDL 31, LDL 57, and triglycerides 182.  Comprehensive metabolic profile was normal with exception of sodium 135.  Hemoglobin A1c 5.3.  CBC on July 22, 2024 was normal.  BNP was 18.  High-sensitivity troponin level 4.     Some of his symptoms are clearly suggestive of accelerating angina pectoris.  He also has some symptoms suggesting reflux disease and possible gastritis.  He is scheduled to undergo cardiac catheterization along with angioplasty and stenting if deemed necessary on August 30, 2024.  He will hold 2 doses of Eliquis and metformin prior to the procedure.  Imdur was increased to 120 mg daily.  He was advised on the use of as needed sublingual nitroglycerin and need to seek medical attention immediately should he develop any new or worsening symptoms.  CBC and BMP will be drawn today.  Other details as noted below.     The above portion of this note was dictated by me using voice recognition software. I personally performed the services described in the documentation. The scribe entering the documentation below was in my presence. I affirm that the information is both accurate and complete.       Objective:     Vitals:    08/28/24 1440   BP: 127/76   Pulse: 64       Wt Readings from Last 4 Encounters:   08/28/24 109 kg (241 lb)   08/05/24 109 kg (241 lb)   07/30/24 108 kg (237 lb 14.4 oz)   07/22/24 108 kg (238 lb 8.6 oz)       Allergies:     No Known Allergies       Medications:     Current Outpatient Medications   Medication Instructions    albuterol 90 mcg/actuation inhaler 2 puffs,  inhalation, Every 6 hours PRN    apixaban (ELIQUIS) 5 mg, oral, 2 times daily    aspirin 81 mg EC tablet 1 tablet, oral, Daily    isosorbide mononitrate ER (IMDUR) 120 mg, oral, Daily, Do not crush or chew.    lisinopril 10 mg, oral, Daily    metFORMIN XR (GLUCOPHAGE-XR) 500 mg, oral, Daily with evening meal    metoprolol succinate XL (TOPROL-XL) 50 mg, oral, Daily    nitroglycerin (NITROSTAT) 0.4 mg, sublingual, Every 5 min PRN, May repeat dose every 5 minutes for up to 3 doses total.    rosuvastatin (CRESTOR) 40 mg, oral, Daily    triamcinolone (Kenalog) 0.1 % cream 1 Application, Topical, 2 times daily PRN, Monday - Friday only       Physical Examination:   GENERAL:  Well developed, well nourished, in no acute distress.  CHEST:  Symmetric and nontender.  NEURO/PSYCH:  Alert and oriented times three with approppriate behavior and responses.  NECK:  Supple, no JVD, no bruit.  LUNGS:  Clear to auscultation bilaterally, normal respiratory effort.  HEART:  Rate and rhythm regular with no evident murmur, no gallop appreciated.        There are no rubs, clicks or heaves.  EXTREMITIES:  Warm with good color, no clubbing or cyanosis.  There is no edema noted.  PERIPHERAL VASCULAR:  Pulses present and equally palpable; 2+ throughout.      Lab:     CBC:   Lab Results   Component Value Date    WBC 4.5 07/22/2024    RBC 5.02 07/22/2024    HGB 14.2 07/22/2024    HCT 41.6 07/22/2024    PLT 64 (L) 07/22/2024        CMP:    Lab Results   Component Value Date     (L) 07/26/2024    K 4.0 07/26/2024     07/26/2024    CO2 27 07/26/2024    BUN 11 07/26/2024    CREATININE 0.96 07/26/2024    GLUCOSE 109 (H) 07/26/2024    CALCIUM 10.0 07/26/2024       Lipid Profile:    Lab Results   Component Value Date    TRIG 182 (H) 07/26/2024    HDL 30.7 07/26/2024    LDLCALC 57 07/26/2024       BMP:  Lab Results   Component Value Date     (L) 07/26/2024     (L) 07/22/2024     (L) 07/21/2024    K 4.0 07/26/2024    K 3.9  07/22/2024    K 3.9 07/21/2024     07/26/2024     07/22/2024     07/21/2024    CO2 27 07/26/2024    CO2 24 07/22/2024    CO2 24 07/21/2024    BUN 11 07/26/2024    BUN 16 07/22/2024    BUN 16 07/21/2024    CREATININE 0.96 07/26/2024    CREATININE 0.89 07/22/2024    CREATININE 1.05 07/21/2024       CBC:  Lab Results   Component Value Date    WBC 4.5 07/22/2024    WBC 6.2 07/21/2024    WBC 5.6 12/15/2022    WBC 4.6 05/18/2022    WBC 5.8 04/13/2022    RBC 5.02 07/22/2024    RBC 5.49 07/21/2024    RBC 5.40 12/15/2022    RBC 5.37 05/18/2022    RBC 5.43 04/13/2022    HGB 14.2 07/22/2024    HGB 15.6 07/21/2024    HGB 15.3 12/15/2022    HGB 15.3 05/18/2022    HGB 15.6 04/13/2022    HCT 41.6 07/22/2024    HCT 45.5 07/21/2024    HCT 46.0 12/15/2022    HCT 46.4 05/18/2022    HCT 46.6 04/13/2022    MCV 83 07/22/2024    MCV 83 07/21/2024    MCV 85 12/15/2022    MCV 86 05/18/2022    MCV 86 04/13/2022    MCH 28.3 07/22/2024    MCH 28.4 07/21/2024    MCHC 34.1 07/22/2024    MCHC 34.3 07/21/2024    MCHC 33.3 12/15/2022    MCHC 33.0 05/18/2022    MCHC 33.5 04/13/2022    RDW 13.1 07/22/2024    RDW 13.1 07/21/2024    RDW 13.6 12/15/2022    RDW 13.2 05/18/2022    RDW 13.3 04/13/2022    PLT 64 (L) 07/22/2024    PLT 81 (L) 07/21/2024    PLT 84 (L) 12/15/2022    PLT 88 (L) 05/18/2022    PLT 85 (L) 04/13/2022       Hepatic Function Panel:    Lab Results   Component Value Date    ALKPHOS 82 07/26/2024    ALT 35 07/26/2024    AST 27 07/26/2024    PROT 7.0 07/26/2024    BILITOT 1.2 07/26/2024    BILIDIR 0.1 10/01/2020       HgBA1c:    Lab Results   Component Value Date    HGBA1C 5.3 07/26/2024       TSH:    Lab Results   Component Value Date    TSH 3.11 02/12/2019       BNP:   Lab Results   Component Value Date    BNP 18 07/21/2024        PT/INR:    Lab Results   Component Value Date    PROTIME 13.5 (H) 10/01/2020    INR 1.2 (H) 10/01/2020       Cardiac Enzymes:    Lab Results   Component Value Date    TROPHS 4 07/21/2024     TROPHS 4 07/21/2024       Diagnostic Studies:     Nuclear Stress Test  Result Date: 8/2/2024    Abnormal Lexiscan Myoview cardiac perfusion stress test. No Myocardial ischemia by perfusion imaging. Moderate inferior apical from apex to mid base myocardial infarction by perfusion imaging. Normal left ventricular systolic function. Left ventricular ejection fraction 64 %. The above study is abnormal. Does display a moderate-sized inferior defect from inferior apical region into the mid base region. No ischemia seen. Global wall motion and function are normal. Patient does have a clinical history of MI in 1999 in 2003 and which may account for this. The prior nuclear scan however done in 2016 was interpreted as normal. Clinical correlation is advised..     Signed by: Omkar Newman 8/2/2024 11:49 AM mother      Problem List:     Patient Active Problem List   Diagnosis    Abnormal LFTs    Coronary artery disease involving native coronary artery of native heart with angina pectoris (CMS-HCC)    Bilateral sensorineural hearing loss    Chronic nasal congestion    Cirrhosis (Multi)    Cough    Dizziness    Elevated serum creatinine    Eustachian tube dysfunction    Fatty liver    Hearing loss, bilateral    History of PTCA    Hyperlipidemia    Hypersplenism    Hypertension    Inability to maintain erection    Insomnia    Memory changes    Nicotine dependence    Palpitations    Past myocardial infarction    Retained myringotomy tube    Small bowel obstruction (Multi)    Somnolence, daytime    Thrombocytopenia (CMS-HCC)    Type 2 diabetes mellitus without complication, without long-term current use of insulin (Multi)    History of alcohol abuse    Class 1 obesity with serious comorbidity and body mass index (BMI) of 33.0 to 33.9 in adult    Abnormal finding in urine    Alcohol abuse    Anal discharge    Bursitis of elbow    Difficulty breathing    Dysfunction of both eustachian tubes    History of cardiac catheterization     History of ear disorder    History of pulmonary embolism    Inflammatory dermatosis    Lateral epicondylitis of both elbows    Portal hypertension (Multi)    Tinnitus, right ear    Pulmonary embolism without acute cor pulmonale, unspecified chronicity, unspecified pulmonary embolism type (Multi)    Chest pain    Angina pectoris, unstable (Multi)       Asessment:     Problem List Items Addressed This Visit             ICD-10-CM    Coronary artery disease involving native coronary artery of native heart with angina pectoris (CMS-HCC) - Primary I25.119    History of PTCA Z98.61    Hyperlipidemia E78.5    Hypertension I10    Nicotine dependence F17.200    Past myocardial infarction I25.2    Type 2 diabetes mellitus without complication, without long-term current use of insulin (Multi) E11.9    History of pulmonary embolism Z86.711

## 2024-08-28 NOTE — PROGRESS NOTES
Patient:  Yuri Reynolds  YOB: 1960  MRN: [id number]       HPI:       Yuri Reynolds is a 64 y.o. male who returns today for cardiac follow-up.    He has a history of atherosclerotic heart disease with remote inferior wall myocardial infarction 1999. He had an apical myocardial infarction in August 2003. He underwent angioplasty and stenting of the right coronary artery in 1999 and in 2003. A myocardial perfusion study in April 2015 was negative for ischemia with calculated LV ejection fraction of 65%. He has a history of essential hypertension, type 2 diabetes mellitus, and hyperlipidemia. He stopped smoking tobacco back in January 2017.      He previously noted some intermittent palpitations and a Holter monitor showed normal sinus rhythm with occasional PACs and PVCs. His symptoms correlated with the ectopy. He also had some exertional shortness breath in the setting of progressive weight gain. Extensive pulmonary evaluation was unremarkable. An echocardiogram in August 2017 showed normal LV systolic function and normal valvular structure and function. He was treated in 2019 for a bowel obstruction. He was managed conservatively and it resolved. In the process he was diagnosed with cirrhosis of the liver. He was drinking quite heavily at the time but has completely stopped using any alcohol. He also stopped smoking.       He was seen in the office April 10, 2024 and complained of some exertional shortness of breath, fatigue, and diaphoresis.  He was scheduled for an echocardiogram and Lexiscan myocardial perfusion study.  There were some delays in scheduling.  He was seen in consultation at [facility] on July 22, 2024.  He reported ongoing shortness of breath with lesser amounts of exertion.  He was also experiencing intermittent pressure discomfort in his upper chest over the previous few weeks.  He states on at least 2 occasions the chest pressure was quite intense and prolonged.  At 1 point he thought  Colonoscopy scheduled with DR BERG in [place] on 8/12/2024 at 10:45 a.m.  Instructions sent to patient.   he might be having a heart attack so he decided to come to the emergency department.  Initial EKG showed normal sinus rhythm with some lateral nonspecific ST-T wave changes.  Repeat EKG was normal.  High-sensitivity troponin levels 4 and 4.  CBC and CMP normal with exception of sodium 135.  CT of the chest showed nonenhancement of right lower lobe subsegmental branches suspicious for pulmonary emboli.  He was placed on a heparin drip and admitted to telemetry.  This was subsequently transitioned to [facility].   He denied any recent travel.  He had been active without limitations.  No recent trauma.       At the time of his office visit August 5, 2024 he noted intermittent exertional chest discomfort.  A Lexiscan myocardial perfusion study August 2, 2024 showed a moderate inferoapical myocardial infarction.  No myocardial ischemia.  Calculated LV ejection fraction of 64%.  He does have a prior history of apical myocardial infarction 1999 and inferior myocardial infarction in 2003.  Echocardiogram July 22, 2024 showed an estimated LV ejection fraction 55 to 60% with mild concentric left ventricular hypertrophy.  Trivial to 1+ tricuspid regurgitation with estimated RVSP 23 mmHg.       He has gone back to work but continues to avoid any heavy lifting.  He describes 3 different types of chest discomfort.  He again notes that if he overdoes it he does feel a pressure-like discomfort in his upper chest and this radiates into both jaws.  He also notes some discomfort in the back of his neck, discomfort going down into both forearms, as well as some discomfort in the mid upper abdomen.  No other exacerbating or relieving factors.  The symptoms have improved somewhat since he started on isosorbide mononitrate but he still is needed to significantly curtail his activities.  He describes some burning discomfort in his lower chest and upper abdomen when he wakes up about 5 AM to go to the bathroom.  This is different than the  symptoms described above.  He also notes some burning discomfort in his upper chest at times when he is eating.  He denies any shortness of breath at rest.    He denies any orthopnea, PND, or increasing peripheral edema.  He denies any palpitations, lightheadedness, near-syncope, or syncope.  He denies any fever, chills, or cough.  He denies any nausea or vomiting.  Lab studies July 26, 2024 showed a cholesterol 124 with HDL 31, LDL 57, and triglycerides 182.  Comprehensive metabolic profile was normal with exception of sodium 135.  Hemoglobin A1c 5.3.  CBC on July 22, 2024 was normal.  BNP was 18.  High-sensitivity troponin level 4.     Some of his symptoms are clearly suggestive of accelerating angina pectoris.  He also has some symptoms suggesting reflux disease and possible gastritis.  He is scheduled to undergo cardiac catheterization along with angioplasty and stenting if deemed necessary on August 30, 2024.  He will hold 2 doses of Eliquis and metformin prior to the procedure.  Imdur was increased to 120 mg daily.  He was advised on the use of as needed sublingual nitroglycerin and need to seek medical attention immediately should he develop any new or worsening symptoms.  CBC and BMP will be drawn today.  Other details as noted below.     The above portion of this note was dictated by me using voice recognition software. I personally performed the services described in the documentation. The scribe entering the documentation below was in my presence. I affirm that the information is both accurate and complete.       Objective:     Vitals:    08/28/24 1440   BP: 127/76   Pulse: 64       Wt Readings from Last 4 Encounters:   08/28/24 109 kg (241 lb)   08/05/24 109 kg (241 lb)   07/30/24 108 kg (237 lb 14.4 oz)   07/22/24 108 kg (238 lb 8.6 oz)       Allergies:     No Known Allergies       Medications:     Current Outpatient Medications   Medication Instructions    albuterol 90 mcg/actuation inhaler 2 puffs,  inhalation, Every 6 hours PRN    apixaban (ELIQUIS) 5 mg, oral, 2 times daily    aspirin 81 mg EC tablet 1 tablet, oral, Daily    isosorbide mononitrate ER (IMDUR) 120 mg, oral, Daily, Do not crush or chew.    lisinopril 10 mg, oral, Daily    metFORMIN XR (GLUCOPHAGE-XR) 500 mg, oral, Daily with evening meal    metoprolol succinate XL (TOPROL-XL) 50 mg, oral, Daily    nitroglycerin (NITROSTAT) 0.4 mg, sublingual, Every 5 min PRN, May repeat dose every 5 minutes for up to 3 doses total.    rosuvastatin (CRESTOR) 40 mg, oral, Daily    triamcinolone (Kenalog) 0.1 % cream 1 Application, Topical, 2 times daily PRN, Monday - Friday only       Physical Examination:   GENERAL:  Well developed, well nourished, in no acute distress.  CHEST:  Symmetric and nontender.  NEURO/PSYCH:  Alert and oriented times three with approppriate behavior and responses.  NECK:  Supple, no JVD, no bruit.  LUNGS:  Clear to auscultation bilaterally, normal respiratory effort.  HEART:  Rate and rhythm regular with no evident murmur, no gallop appreciated.        There are no rubs, clicks or heaves.  EXTREMITIES:  Warm with good color, no clubbing or cyanosis.  There is no edema noted.  PERIPHERAL VASCULAR:  Pulses present and equally palpable; 2+ throughout.      Lab:     CBC:   Lab Results   Component Value Date    WBC 4.5 07/22/2024    RBC 5.02 07/22/2024    HGB 14.2 07/22/2024    HCT 41.6 07/22/2024    PLT 64 (L) 07/22/2024        CMP:    Lab Results   Component Value Date     (L) 07/26/2024    K 4.0 07/26/2024     07/26/2024    CO2 27 07/26/2024    BUN 11 07/26/2024    CREATININE 0.96 07/26/2024    GLUCOSE 109 (H) 07/26/2024    CALCIUM 10.0 07/26/2024       Lipid Profile:    Lab Results   Component Value Date    TRIG 182 (H) 07/26/2024    HDL 30.7 07/26/2024    LDLCALC 57 07/26/2024       BMP:  Lab Results   Component Value Date     (L) 07/26/2024     (L) 07/22/2024     (L) 07/21/2024    K 4.0 07/26/2024    K 3.9  07/22/2024    K 3.9 07/21/2024     07/26/2024     07/22/2024     07/21/2024    CO2 27 07/26/2024    CO2 24 07/22/2024    CO2 24 07/21/2024    BUN 11 07/26/2024    BUN 16 07/22/2024    BUN 16 07/21/2024    CREATININE 0.96 07/26/2024    CREATININE 0.89 07/22/2024    CREATININE 1.05 07/21/2024       CBC:  Lab Results   Component Value Date    WBC 4.5 07/22/2024    WBC 6.2 07/21/2024    WBC 5.6 12/15/2022    WBC 4.6 05/18/2022    WBC 5.8 04/13/2022    RBC 5.02 07/22/2024    RBC 5.49 07/21/2024    RBC 5.40 12/15/2022    RBC 5.37 05/18/2022    RBC 5.43 04/13/2022    HGB 14.2 07/22/2024    HGB 15.6 07/21/2024    HGB 15.3 12/15/2022    HGB 15.3 05/18/2022    HGB 15.6 04/13/2022    HCT 41.6 07/22/2024    HCT 45.5 07/21/2024    HCT 46.0 12/15/2022    HCT 46.4 05/18/2022    HCT 46.6 04/13/2022    MCV 83 07/22/2024    MCV 83 07/21/2024    MCV 85 12/15/2022    MCV 86 05/18/2022    MCV 86 04/13/2022    MCH 28.3 07/22/2024    MCH 28.4 07/21/2024    MCHC 34.1 07/22/2024    MCHC 34.3 07/21/2024    MCHC 33.3 12/15/2022    MCHC 33.0 05/18/2022    MCHC 33.5 04/13/2022    RDW 13.1 07/22/2024    RDW 13.1 07/21/2024    RDW 13.6 12/15/2022    RDW 13.2 05/18/2022    RDW 13.3 04/13/2022    PLT 64 (L) 07/22/2024    PLT 81 (L) 07/21/2024    PLT 84 (L) 12/15/2022    PLT 88 (L) 05/18/2022    PLT 85 (L) 04/13/2022       Hepatic Function Panel:    Lab Results   Component Value Date    ALKPHOS 82 07/26/2024    ALT 35 07/26/2024    AST 27 07/26/2024    PROT 7.0 07/26/2024    BILITOT 1.2 07/26/2024    BILIDIR 0.1 10/01/2020       HgBA1c:    Lab Results   Component Value Date    HGBA1C 5.3 07/26/2024       TSH:    Lab Results   Component Value Date    TSH 3.11 02/12/2019       BNP:   Lab Results   Component Value Date    BNP 18 07/21/2024        PT/INR:    Lab Results   Component Value Date    PROTIME 13.5 (H) 10/01/2020    INR 1.2 (H) 10/01/2020       Cardiac Enzymes:    Lab Results   Component Value Date    TROPHS 4 07/21/2024     TROPHS 4 07/21/2024       Diagnostic Studies:     Nuclear Stress Test  Result Date: 8/2/2024    Abnormal Lexiscan Myoview cardiac perfusion stress test. No Myocardial ischemia by perfusion imaging. Moderate inferior apical from apex to mid base myocardial infarction by perfusion imaging. Normal left ventricular systolic function. Left ventricular ejection fraction 64 %. The above study is abnormal. Does display a moderate-sized inferior defect from inferior apical region into the mid base region. No ischemia seen. Global wall motion and function are normal. Patient does have a clinical history of MI in 1999 in 2003 and which may account for this. The prior nuclear scan however done in 2016 was interpreted as normal. Clinical correlation is advised..     Signed by: Omkar Newman 8/2/2024 11:49 AM mother      Problem List:     Patient Active Problem List   Diagnosis    Abnormal LFTs    Coronary artery disease involving native coronary artery of native heart with angina pectoris (CMS-HCC)    Bilateral sensorineural hearing loss    Chronic nasal congestion    Cirrhosis (Multi)    Cough    Dizziness    Elevated serum creatinine    Eustachian tube dysfunction    Fatty liver    Hearing loss, bilateral    History of PTCA    Hyperlipidemia    Hypersplenism    Hypertension    Inability to maintain erection    Insomnia    Memory changes    Nicotine dependence    Palpitations    Past myocardial infarction    Retained myringotomy tube    Small bowel obstruction (Multi)    Somnolence, daytime    Thrombocytopenia (CMS-HCC)    Type 2 diabetes mellitus without complication, without long-term current use of insulin (Multi)    History of alcohol abuse    Class 1 obesity with serious comorbidity and body mass index (BMI) of 33.0 to 33.9 in adult    Abnormal finding in urine    Alcohol abuse    Anal discharge    Bursitis of elbow    Difficulty breathing    Dysfunction of both eustachian tubes    History of cardiac catheterization     History of ear disorder    History of pulmonary embolism    Inflammatory dermatosis    Lateral epicondylitis of both elbows    Portal hypertension (Multi)    Tinnitus, right ear    Pulmonary embolism without acute cor pulmonale, unspecified chronicity, unspecified pulmonary embolism type (Multi)    Chest pain    Angina pectoris, unstable (Multi)       Asessment:     Problem List Items Addressed This Visit             ICD-10-CM    Coronary artery disease involving native coronary artery of native heart with angina pectoris (CMS-HCC) - Primary I25.119    History of PTCA Z98.61    Hyperlipidemia E78.5    Hypertension I10    Nicotine dependence F17.200    Past myocardial infarction I25.2    Type 2 diabetes mellitus without complication, without long-term current use of insulin (Multi) E11.9    History of pulmonary embolism Z86.711

## 2024-08-30 ENCOUNTER — APPOINTMENT (OUTPATIENT)
Dept: RADIOLOGY | Facility: HOSPITAL | Age: 64
End: 2024-08-30
Payer: COMMERCIAL

## 2024-08-30 ENCOUNTER — HOSPITAL ENCOUNTER (OUTPATIENT)
Facility: HOSPITAL | Age: 64
Setting detail: OUTPATIENT SURGERY
Discharge: HOME | End: 2024-08-30
Attending: INTERNAL MEDICINE | Admitting: INTERNAL MEDICINE
Payer: COMMERCIAL

## 2024-08-30 ENCOUNTER — APPOINTMENT (OUTPATIENT)
Dept: CARDIOLOGY | Facility: HOSPITAL | Age: 64
End: 2024-08-30
Payer: COMMERCIAL

## 2024-08-30 VITALS
HEART RATE: 67 BPM | OXYGEN SATURATION: 99 % | WEIGHT: 235.45 LBS | HEIGHT: 72 IN | TEMPERATURE: 97.2 F | BODY MASS INDEX: 31.89 KG/M2 | RESPIRATION RATE: 10 BRPM | DIASTOLIC BLOOD PRESSURE: 68 MMHG | SYSTOLIC BLOOD PRESSURE: 116 MMHG

## 2024-08-30 DIAGNOSIS — R07.9 CHEST PAIN, UNSPECIFIED TYPE: ICD-10-CM

## 2024-08-30 DIAGNOSIS — I25.2 PAST MYOCARDIAL INFARCTION: ICD-10-CM

## 2024-08-30 DIAGNOSIS — I25.721 ATHEROSCLEROSIS OF AUTOLOGOUS ARTERY CORONARY ARTERY BYPASS GRAFT(S) WITH ANGINA PECTORIS WITH DOCUMENTED SPASM: ICD-10-CM

## 2024-08-30 DIAGNOSIS — I26.99 PULMONARY EMBOLISM WITHOUT ACUTE COR PULMONALE, UNSPECIFIED CHRONICITY, UNSPECIFIED PULMONARY EMBOLISM TYPE (MULTI): ICD-10-CM

## 2024-08-30 DIAGNOSIS — F17.218 CIGARETTE NICOTINE DEPENDENCE WITH OTHER NICOTINE-INDUCED DISORDER: ICD-10-CM

## 2024-08-30 DIAGNOSIS — I20.0 ANGINA PECTORIS, UNSTABLE (MULTI): Primary | ICD-10-CM

## 2024-08-30 DIAGNOSIS — I25.119 CORONARY ARTERY DISEASE INVOLVING NATIVE CORONARY ARTERY OF NATIVE HEART WITH ANGINA PECTORIS: ICD-10-CM

## 2024-08-30 PROCEDURE — 7100000002 HC RECOVERY ROOM TIME - EACH INCREMENTAL 1 MINUTE: Performed by: INTERNAL MEDICINE

## 2024-08-30 PROCEDURE — 99152 MOD SED SAME PHYS/QHP 5/>YRS: CPT | Performed by: INTERNAL MEDICINE

## 2024-08-30 PROCEDURE — 2500000005 HC RX 250 GENERAL PHARMACY W/O HCPCS: Performed by: INTERNAL MEDICINE

## 2024-08-30 PROCEDURE — 93458 L HRT ARTERY/VENTRICLE ANGIO: CPT | Performed by: INTERNAL MEDICINE

## 2024-08-30 PROCEDURE — 2720000007 HC OR 272 NO HCPCS: Performed by: INTERNAL MEDICINE

## 2024-08-30 PROCEDURE — 2550000001 HC RX 255 CONTRASTS: Performed by: INTERNAL MEDICINE

## 2024-08-30 PROCEDURE — 71046 X-RAY EXAM CHEST 2 VIEWS: CPT

## 2024-08-30 PROCEDURE — 93005 ELECTROCARDIOGRAM TRACING: CPT

## 2024-08-30 PROCEDURE — 2500000004 HC RX 250 GENERAL PHARMACY W/ HCPCS (ALT 636 FOR OP/ED): Performed by: INTERNAL MEDICINE

## 2024-08-30 PROCEDURE — 99024 POSTOP FOLLOW-UP VISIT: CPT | Performed by: NURSE PRACTITIONER

## 2024-08-30 PROCEDURE — 99222 1ST HOSP IP/OBS MODERATE 55: CPT | Performed by: NURSE PRACTITIONER

## 2024-08-30 PROCEDURE — 71275 CT ANGIOGRAPHY CHEST: CPT

## 2024-08-30 PROCEDURE — 2780000003 HC OR 278 NO HCPCS: Performed by: INTERNAL MEDICINE

## 2024-08-30 PROCEDURE — 71250 CT THORAX DX C-: CPT

## 2024-08-30 PROCEDURE — C1760 CLOSURE DEV, VASC: HCPCS | Performed by: INTERNAL MEDICINE

## 2024-08-30 PROCEDURE — 93970 EXTREMITY STUDY: CPT

## 2024-08-30 PROCEDURE — 7100000001 HC RECOVERY ROOM TIME - INITIAL BASE CHARGE: Performed by: INTERNAL MEDICINE

## 2024-08-30 PROCEDURE — 93010 ELECTROCARDIOGRAM REPORT: CPT | Performed by: INTERNAL MEDICINE

## 2024-08-30 PROCEDURE — 7100000010 HC PHASE TWO TIME - EACH INCREMENTAL 1 MINUTE: Performed by: INTERNAL MEDICINE

## 2024-08-30 PROCEDURE — 7100000009 HC PHASE TWO TIME - INITIAL BASE CHARGE: Performed by: INTERNAL MEDICINE

## 2024-08-30 PROCEDURE — 93880 EXTRACRANIAL BILAT STUDY: CPT

## 2024-08-30 PROCEDURE — G0269 OCCLUSIVE DEVICE IN VEIN ART: HCPCS | Mod: 59 | Performed by: INTERNAL MEDICINE

## 2024-08-30 RX ORDER — SODIUM CHLORIDE 9 MG/ML
100 INJECTION, SOLUTION INTRAVENOUS CONTINUOUS
Status: DISCONTINUED | OUTPATIENT
Start: 2024-08-30 | End: 2024-08-30

## 2024-08-30 RX ORDER — MIDAZOLAM HYDROCHLORIDE 1 MG/ML
INJECTION, SOLUTION INTRAMUSCULAR; INTRAVENOUS AS NEEDED
Status: DISCONTINUED | OUTPATIENT
Start: 2024-08-30 | End: 2024-08-30 | Stop reason: HOSPADM

## 2024-08-30 RX ORDER — LIDOCAINE HYDROCHLORIDE 20 MG/ML
INJECTION, SOLUTION INFILTRATION; PERINEURAL AS NEEDED
Status: DISCONTINUED | OUTPATIENT
Start: 2024-08-30 | End: 2024-08-30 | Stop reason: HOSPADM

## 2024-08-30 RX ORDER — ASPIRIN 325 MG
325 TABLET ORAL ONCE
Status: DISCONTINUED | OUTPATIENT
Start: 2024-08-30 | End: 2024-08-30

## 2024-08-30 RX ORDER — SODIUM CHLORIDE 9 MG/ML
100 INJECTION, SOLUTION INTRAVENOUS CONTINUOUS
Status: ACTIVE | OUTPATIENT
Start: 2024-08-30 | End: 2024-08-30

## 2024-08-30 RX ORDER — FENTANYL CITRATE 50 UG/ML
INJECTION, SOLUTION INTRAMUSCULAR; INTRAVENOUS AS NEEDED
Status: DISCONTINUED | OUTPATIENT
Start: 2024-08-30 | End: 2024-08-30 | Stop reason: HOSPADM

## 2024-08-30 ASSESSMENT — ENCOUNTER SYMPTOMS
SHORTNESS OF BREATH: 1
FATIGUE: 1
PALPITATIONS: 0
DIAPHORESIS: 1
DIZZINESS: 0
ACTIVITY CHANGE: 1

## 2024-08-30 ASSESSMENT — PAIN SCALES - GENERAL
PAINLEVEL_OUTOF10: 0 - NO PAIN

## 2024-08-30 ASSESSMENT — COLUMBIA-SUICIDE SEVERITY RATING SCALE - C-SSRS
2. HAVE YOU ACTUALLY HAD ANY THOUGHTS OF KILLING YOURSELF?: NO
1. IN THE PAST MONTH, HAVE YOU WISHED YOU WERE DEAD OR WISHED YOU COULD GO TO SLEEP AND NOT WAKE UP?: NO
6. HAVE YOU EVER DONE ANYTHING, STARTED TO DO ANYTHING, OR PREPARED TO DO ANYTHING TO END YOUR LIFE?: NO

## 2024-08-30 ASSESSMENT — PAIN - FUNCTIONAL ASSESSMENT
PAIN_FUNCTIONAL_ASSESSMENT: 0-10

## 2024-08-30 NOTE — DISCHARGE INSTRUCTIONS
CARDIAC CATHETERIZATION DISCHARGE INSTRUCTIONS     **FOR SUDDEN AND SEVERE CHEST PAIN, SHORTNESS OF BREATH OR ANY SIGNS OF ANGINA-TAKE YOUR NITROGYCERIN AS DIRECTED AND CALL 911! IF YOU HAVE EXCESSIVE BLEEDING, SIGNS OF STROKE, OR CHANGES IN MENTAL STATUS YOU SHOULD CALL 911 IMMEDIATELY.     If your provider has prescribed aspirin and/or clopidogrel (Plavix), or prasugrel (Effient), or ticagrelor (Brilinta), DO NOT STOP THESE MEDICATIONS UNLESS INSTRUCTED BY YOUR CARDIOLOGIST for any reason without talking to your cardiologist first. If any of these were prescribed, you must take them every day without missing a single dose. If you are getting low on these medications, contact your provider immediately for a refill.     FOR NEXT 24 HOURS  - Upon discharge, you should return home and rest for the remainder of the day and evening. You do not have to stay on bed rest but should not be very active.  It is recommended a responsible adult be with you for the first 24 hours after the procedure.    - No driving for 24 hours after procedure. Please arrange for someone to drive you home from the hospital today.     - Do not drive, operate machinery, or use power tools for 24 hours after your procedure.     - Do not make any legal decisions for 24 hours after your procedure.     - Do not drink alcoholic beverages for 24 hours after your procedure.    WOUND CARE   *FOR FEMORAL (LEG) ACCESS*  ·      No heavy lifting, pushing or pulling over 10 pounds for 3-4 days. No squatting, frequent bending, strenuous exercise, sexual activity or any activity that could cause stress on your groin site that could cause bleeding from arterial puncture site.   ·      No submerged bathing, swimming, or hot tubs for the next 7 days, or until fully healed.  ·      Avoid sexual activity for 3-4 days until any groin discomfort has ceased.    - Remove dressing from the site 24 hours after the procedure.  Wash the site gently with soap and  water. You may apply a Band-Aid to the site.  Rinse well and pat dry. Keep the area clean and dry. Avoid lotions, ointments, or powders until fully healed.     - You may shower the day after your procedure.      - It is normal to notice a small bruise around the puncture site and/or a small grape sized or smaller lump. Any large bruising or large lump warrants a call to the office.     - If bleeding should occur, lay down and apply pressure to the affected area for 10 minutes.  If the bleeding stops notify your physician.  If there is a large amount of bleeding or spurting of blood CALL 911 immediately.  DO NOT drive yourself to the hospital.    - You may experience some tenderness, bruising or minimal inflammation.  If you have any concerns or if any of these symptoms become excessive, contact your cardiologist [phone] or go to the emergency room.     OTHER INSTRUCTIONS  - You may take acetaminophen (Tylenol) as directed for discomfort.  If pain is not relieved with acetaminophen (Tylenol), contact your doctor.    - If you notice or experience any of the following, you should notify your doctor or seek medical attention  Chest pain or discomfort  Change in mental status or weakness in extremities.  Dizziness, light headedness, or feeling faint.  Change in the site where the procedure was performed, such as bleeding or an increased area of bruising or swelling.  Tingling, numbness, pain, or coolness in the leg/arm beyond the site where the procedure was performed.  Signs of infection (i.e. shaking chills, temperature > 100 degrees Fahrenheit, warmth, redness) in the leg/arm area where the procedure was performed.  Changes in urination   Bloody or black stools  Vomiting blood  Severe nose bleeds  Any excessive bleeding

## 2024-08-30 NOTE — H&P (VIEW-ONLY)
Inpatient consult to Cardiothoracic Surgery  Consult performed by: NAVARRO Beck-CNP  Consult ordered by: MARLO Jones  Reason for consult: Triple vessel CAD; Dr. CABALLERO recommending CABG          Reason For Consult  Triple vessel CAD; Dr. CABALLERO recommending CABG    History Of Present Illness  Yuri Reynolds is a 64 y.o. male with PMHx of HTN, HLD, CAD with remote hx of MI (1999, 2003) s/p PCI, former nicotine dependence (quit 2017), former alcohol abuse associated with alcoholic cirrhosis (quit drinking in 2019), chronic thrombocytopenia 2/2 cirrhosis, and recently diagnosed PE on Eliquis. He has been having symptoms of exertional chest pain associated with dyspnea since April which have continued to progress where activity is now limited. He was admitted to [facility] in July with chest pain and SOB; CTA at that time suspicious for PE and patient started on Eliquis. Echo at that time revealed normal LV function with EF 55-60%, mild concentric LVH, no RWMA's, and trivial to 1+ tricuspid regurgitation with RVSP 23mmHg. He was evaluated by Cardiology this admission who felt ischemic eval could be done electively. Pt discharged to home on Eliquis on 7/22. He underwent Lexiscan perfusion study 8/2 revealing a moderate inferoapical myocardial infarction and angiogram advised. He presented to [facility] electively today and underwent coronary angiogram under the care of Dr. Moser. This revealed severe triple vessel disease including  of Cx and surgical revascularization advised. CTS consulted for further recommendations.     Pt seen in bed s/p [facility]. He is afebrile and HDS. Maintaining sinus rhythm. Normotensive. Adequate oxygen saturation on room air. Currently chest pain free but does endorse intermittent resting symptoms of chest heaviness with radiation to left arm and throat. Additionally notes symptoms with moderate exertion that relieve with rest. Office visit with Dr. Gan scheduled this Tuesday at  1500. Will obtain pre-op testing prior to DC as well as CTA chest to reassess recently diagnosed PE.      Past Medical History  He has a past medical history of Alcohol abuse, uncomplicated (12/20/2022), Coronary artery disease, Diabetes mellitus (Multi), Hyperlipidemia, Hypertension, Old myocardial infarction, Other specified postprocedural states, Personal history of other diseases of the nervous system and sense organs, and Personal history of pulmonary embolism.    Surgical History  He has a past surgical history that includes Other surgical history (10/20/2021).     Social History  He reports that he quit smoking about 8 years ago. His smoking use included cigarettes. He has never been exposed to tobacco smoke. He has never used smokeless tobacco. He reports that he does not currently use alcohol. He reports that he does not use drugs.    Family History  Family History   Problem Relation Name Age of Onset    Diabetes Mother      Other (Other) Father          Arteriosclerotic cardiovascular disease; cardiac disorder    Heart attack Father      Diabetes Sister      Other (Other) Other Grandfather         cardiac disorder        Allergies  Patient has no known allergies.    Review of Systems   Constitutional:  Positive for activity change, diaphoresis and fatigue.   Respiratory:  Positive for shortness of breath.    Cardiovascular:  Positive for chest pain. Negative for palpitations and leg swelling.        Associated with diaphoresis   Neurological:  Negative for dizziness and syncope.   All other systems reviewed and are negative.       Physical Exam  Vitals and nursing note reviewed.   Constitutional:       Appearance: Normal appearance. He is obese.   HENT:      Head: Normocephalic and atraumatic.      Right Ear: External ear normal.      Left Ear: External ear normal.      Nose: Nose normal.      Mouth/Throat:      Mouth: Mucous membranes are moist.      Pharynx: Oropharynx is clear.   Eyes:      Extraocular  Movements: Extraocular movements intact.      Pupils: Pupils are equal, round, and reactive to light.   Cardiovascular:      Rate and Rhythm: Normal rate and regular rhythm.      Heart sounds: No murmur heard.  Pulmonary:      Effort: Pulmonary effort is normal.      Breath sounds: Normal breath sounds.   Abdominal:      General: Bowel sounds are normal.      Palpations: Abdomen is soft.   Musculoskeletal:         General: Normal range of motion.      Cervical back: Normal range of motion and neck supple.   Skin:     General: Skin is warm and dry.   Neurological:      General: No focal deficit present.      Mental Status: He is alert and oriented to person, place, and time.   Psychiatric:         Mood and Affect: Mood normal.         Behavior: Behavior normal.          Last Recorded Vitals  Blood pressure 116/68, pulse 67, temperature 36.2 °C (97.2 °F), temperature source Temporal, resp. rate 10, height 1.829 m (6'), weight 107 kg (235 lb 7.2 oz), SpO2 99%.    Relevant Results  Scheduled medications     Continuous medications  sodium chloride 0.9%, 100 mL/hr      PRN medications    Results for orders placed or performed during the hospital encounter of 08/30/24 (from the past 24 hour(s))   ECG 12 lead STAT   Result Value Ref Range    Ventricular Rate 74 BPM    Atrial Rate 74 BPM    CO Interval 200 ms    QRS Duration 84 ms    QT Interval 368 ms    QTC Calculation(Bazett) 408 ms    P Axis 61 degrees    R Axis -20 degrees    T Axis 14 degrees    QRS Count 13 beats    Q Onset 224 ms    P Onset 124 ms    P Offset 180 ms    T Offset 408 ms    QTC Fredericia 394 ms     ECG 12 lead STAT    Result Date: 8/30/2024  Normal sinus rhythm Normal ECG When compared with ECG of 30-AUG-2024 11:41, (unconfirmed) No significant change was found        Assessment/Plan     CAD; Unstable Angina   Pt with progressive anginal symptoms since April. Admitted 7/21-7/22 for chest pain and SOB at which time CTA chest suspicious for PE and pt  started on Eliquis. Echo at that time revealed normal LV function with EF 55-60%, mild concentric LVH, no RWMA's, and trivial to 1+ tricuspid regurgitation with RVSP 23mmHg. He was evaluated by Cardiology this admission who felt ischemic eval could be done electively. Pt discharged to home on Eliquis on 7/22. He underwent Lexiscan perfusion study 8/2 revealing a moderate inferoapical myocardial infarction and angiogram advised. He presented to [facility] electively today and underwent coronary angiogram under the care of Dr. Moser. This revealed severe triple vessel disease including  of Cx and surgical revascularization advised. CTS consulted for further recommendations.   -Dr. Gan updated  -OV scheduled this Tuesday, 9/3, at 1500  -Obtain routine pre-op testing including labs, CT chest, carotid duplex, and vein mapping prior to discharge  -Continue medical management per Cardiology: Aspirin, high intensity Statin, Toprol XL, and Imdur  -Lisinopril 10mg daily; will need held 48H prior to CABG for vasoplegia ppx   -OK to continue Eliquis for now; will instruct pt when to hold once OR date determined    Pulmonary Embolus  Admitted 7/21-7/22 for chest pain and SOB at which time CTA chest suspicious for PE and pt started on Eliquis.  -repeat CTA chest prior to DC  -OK to continue Eliquis for now; will instruct pt when to hold once OR date determined    NID-DM2; Obesity  Hgb A1C 5.6% (3/2024)  -resume home Metformin as instructed post contrast exposure    HTN; HLD  -management as outlined above  -optimize medical management as hemodynamics allow    Chronic Thrombocytopenia  Possibly secondary to alcoholic cirrhosis. Per chart review baseline Platelets ~64-88k over past 2 years.  -pt scheduled to see hematology at end of September       I spent 60 minutes in the professional and overall care of this patient.

## 2024-08-30 NOTE — NURSING NOTE
Pt returned to room via cart from ordered tests for CABG, R groin site soft drsg D&I with 2+ R pedal pulse. Pt offering no complaints at this time.

## 2024-08-30 NOTE — NURSING NOTE
Printed discharge instructions reviewed including but not limited: restrictions post sedation, follow up appts, restrictions post heart cath r/t groin site care and s/s of bleeding and hematoma reviewed and what to do if it occurs, pt advised to call 911 if he develops angina, pt also advised to call Dr Moser (office number provided) if he has any questions or concerns.

## 2024-08-30 NOTE — PROGRESS NOTES
Patient is stable status post [facility] under the care of Dr. Moser.  Discussed results of procedure with patient and his wife.  Pictures provided.  Findings of the C revealed triple-vessel coronary artery disease with 70% stenosis in the mid LAD, occluded circumflex artery with distal collateral filling, subtotal occlusion in the mid RCA with distal collateral filling and an LVEF of 60%.  Dr. Moser is recommending consult to CT surgery for CABG.  Patient will continue aspirin 81 mg daily, isosorbide mononitrate, metoprolol succinate and high intensity statin therapy.  He was instructed to resume his Eliquis on 8/31/2024 with his morning dose.  Further recommendations per CT surgery.  Will obtain preop testing prior to discharge today.  Patient instructed to report to the emergency department should he develop chest pain unrelieved with rest and/or SL nitro.  He was instructed to take it easy with no lifting, pushing, pulling, greater than 10 pounds until he has undergone surgery.  Postprocedural activity, restrictions, potential complications, medications and future follow-up discussed at length.  Lifestyle modifications discussed at length.  Multiple questions answered.  Both verbalized understanding.

## 2024-08-30 NOTE — CONSULTS
Inpatient consult to Cardiothoracic Surgery  Consult performed by: NAVARRO Beck-CNP  Consult ordered by: MARLO Jones  Reason for consult: Triple vessel CAD; Dr. CABALLERO recommending CABG          Reason For Consult  Triple vessel CAD; Dr. CABALLERO recommending CABG    History Of Present Illness  Yuri Reynolds is a 64 y.o. male with PMHx of HTN, HLD, CAD with remote hx of MI (1999, 2003) s/p PCI, former nicotine dependence (quit 2017), former alcohol abuse associated with alcoholic cirrhosis (quit drinking in 2019), chronic thrombocytopenia 2/2 cirrhosis, and recently diagnosed PE on Eliquis. He has been having symptoms of exertional chest pain associated with dyspnea since April which have continued to progress where activity is now limited. He was admitted to [facility] in July with chest pain and SOB; CTA at that time suspicious for PE and patient started on Eliquis. Echo at that time revealed normal LV function with EF 55-60%, mild concentric LVH, no RWMA's, and trivial to 1+ tricuspid regurgitation with RVSP 23mmHg. He was evaluated by Cardiology this admission who felt ischemic eval could be done electively. Pt discharged to home on Eliquis on 7/22. He underwent Lexiscan perfusion study 8/2 revealing a moderate inferoapical myocardial infarction and angiogram advised. He presented to [facility] electively today and underwent coronary angiogram under the care of Dr. Moser. This revealed severe triple vessel disease including  of Cx and surgical revascularization advised. CTS consulted for further recommendations.     Pt seen in bed s/p [facility]. He is afebrile and HDS. Maintaining sinus rhythm. Normotensive. Adequate oxygen saturation on room air. Currently chest pain free but does endorse intermittent resting symptoms of chest heaviness with radiation to left arm and throat. Additionally notes symptoms with moderate exertion that relieve with rest. Office visit with Dr. Gan scheduled this Tuesday at  1500. Will obtain pre-op testing prior to DC as well as CTA chest to reassess recently diagnosed PE.      Past Medical History  He has a past medical history of Alcohol abuse, uncomplicated (12/20/2022), Coronary artery disease, Diabetes mellitus (Multi), Hyperlipidemia, Hypertension, Old myocardial infarction, Other specified postprocedural states, Personal history of other diseases of the nervous system and sense organs, and Personal history of pulmonary embolism.    Surgical History  He has a past surgical history that includes Other surgical history (10/20/2021).     Social History  He reports that he quit smoking about 8 years ago. His smoking use included cigarettes. He has never been exposed to tobacco smoke. He has never used smokeless tobacco. He reports that he does not currently use alcohol. He reports that he does not use drugs.    Family History  Family History   Problem Relation Name Age of Onset    Diabetes Mother      Other (Other) Father          Arteriosclerotic cardiovascular disease; cardiac disorder    Heart attack Father      Diabetes Sister      Other (Other) Other Grandfather         cardiac disorder        Allergies  Patient has no known allergies.    Review of Systems   Constitutional:  Positive for activity change, diaphoresis and fatigue.   Respiratory:  Positive for shortness of breath.    Cardiovascular:  Positive for chest pain. Negative for palpitations and leg swelling.        Associated with diaphoresis   Neurological:  Negative for dizziness and syncope.   All other systems reviewed and are negative.       Physical Exam  Vitals and nursing note reviewed.   Constitutional:       Appearance: Normal appearance. He is obese.   HENT:      Head: Normocephalic and atraumatic.      Right Ear: External ear normal.      Left Ear: External ear normal.      Nose: Nose normal.      Mouth/Throat:      Mouth: Mucous membranes are moist.      Pharynx: Oropharynx is clear.   Eyes:      Extraocular  Movements: Extraocular movements intact.      Pupils: Pupils are equal, round, and reactive to light.   Cardiovascular:      Rate and Rhythm: Normal rate and regular rhythm.      Heart sounds: No murmur heard.  Pulmonary:      Effort: Pulmonary effort is normal.      Breath sounds: Normal breath sounds.   Abdominal:      General: Bowel sounds are normal.      Palpations: Abdomen is soft.   Musculoskeletal:         General: Normal range of motion.      Cervical back: Normal range of motion and neck supple.   Skin:     General: Skin is warm and dry.   Neurological:      General: No focal deficit present.      Mental Status: He is alert and oriented to person, place, and time.   Psychiatric:         Mood and Affect: Mood normal.         Behavior: Behavior normal.          Last Recorded Vitals  Blood pressure 116/68, pulse 67, temperature 36.2 °C (97.2 °F), temperature source Temporal, resp. rate 10, height 1.829 m (6'), weight 107 kg (235 lb 7.2 oz), SpO2 99%.    Relevant Results  Scheduled medications     Continuous medications  sodium chloride 0.9%, 100 mL/hr      PRN medications    Results for orders placed or performed during the hospital encounter of 08/30/24 (from the past 24 hour(s))   ECG 12 lead STAT   Result Value Ref Range    Ventricular Rate 74 BPM    Atrial Rate 74 BPM    NM Interval 200 ms    QRS Duration 84 ms    QT Interval 368 ms    QTC Calculation(Bazett) 408 ms    P Axis 61 degrees    R Axis -20 degrees    T Axis 14 degrees    QRS Count 13 beats    Q Onset 224 ms    P Onset 124 ms    P Offset 180 ms    T Offset 408 ms    QTC Fredericia 394 ms     ECG 12 lead STAT    Result Date: 8/30/2024  Normal sinus rhythm Normal ECG When compared with ECG of 30-AUG-2024 11:41, (unconfirmed) No significant change was found        Assessment/Plan     CAD; Unstable Angina   Pt with progressive anginal symptoms since April. Admitted 7/21-7/22 for chest pain and SOB at which time CTA chest suspicious for PE and pt  started on Eliquis. Echo at that time revealed normal LV function with EF 55-60%, mild concentric LVH, no RWMA's, and trivial to 1+ tricuspid regurgitation with RVSP 23mmHg. He was evaluated by Cardiology this admission who felt ischemic eval could be done electively. Pt discharged to home on Eliquis on 7/22. He underwent Lexiscan perfusion study 8/2 revealing a moderate inferoapical myocardial infarction and angiogram advised. He presented to [facility] electively today and underwent coronary angiogram under the care of Dr. Moser. This revealed severe triple vessel disease including  of Cx and surgical revascularization advised. CTS consulted for further recommendations.   -Dr. Gan updated  -OV scheduled this Tuesday, 9/3, at 1500  -Obtain routine pre-op testing including labs, CT chest, carotid duplex, and vein mapping prior to discharge  -Continue medical management per Cardiology: Aspirin, high intensity Statin, Toprol XL, and Imdur  -Lisinopril 10mg daily; will need held 48H prior to CABG for vasoplegia ppx   -OK to continue Eliquis for now; will instruct pt when to hold once OR date determined    Pulmonary Embolus  Admitted 7/21-7/22 for chest pain and SOB at which time CTA chest suspicious for PE and pt started on Eliquis.  -repeat CTA chest prior to DC  -OK to continue Eliquis for now; will instruct pt when to hold once OR date determined    NID-DM2; Obesity  Hgb A1C 5.6% (3/2024)  -resume home Metformin as instructed post contrast exposure    HTN; HLD  -management as outlined above  -optimize medical management as hemodynamics allow    Chronic Thrombocytopenia  Possibly secondary to alcoholic cirrhosis. Per chart review baseline Platelets ~64-88k over past 2 years.  -pt scheduled to see hematology at end of September       I spent 60 minutes in the professional and overall care of this patient.

## 2024-08-31 LAB
ATRIAL RATE: 74 BPM
P AXIS: 61 DEGREES
P OFFSET: 180 MS
P ONSET: 124 MS
PR INTERVAL: 200 MS
Q ONSET: 224 MS
QRS COUNT: 13 BEATS
QRS DURATION: 84 MS
QT INTERVAL: 368 MS
QTC CALCULATION(BAZETT): 408 MS
QTC FREDERICIA: 394 MS
R AXIS: -20 DEGREES
T AXIS: 14 DEGREES
T OFFSET: 408 MS
VENTRICULAR RATE: 74 BPM

## 2024-09-03 ENCOUNTER — OFFICE VISIT (OUTPATIENT)
Dept: CARDIAC SURGERY | Facility: CLINIC | Age: 64
End: 2024-09-03
Payer: COMMERCIAL

## 2024-09-03 VITALS
OXYGEN SATURATION: 98 % | TEMPERATURE: 97.3 F | DIASTOLIC BLOOD PRESSURE: 58 MMHG | RESPIRATION RATE: 14 BRPM | HEIGHT: 72 IN | BODY MASS INDEX: 32.1 KG/M2 | WEIGHT: 237 LBS | HEART RATE: 71 BPM | SYSTOLIC BLOOD PRESSURE: 117 MMHG

## 2024-09-03 DIAGNOSIS — I25.118 CORONARY ARTERY DISEASE OF NATIVE ARTERY OF NATIVE HEART WITH STABLE ANGINA PECTORIS (CMS-HCC): Primary | ICD-10-CM

## 2024-09-03 PROCEDURE — 99205 OFFICE O/P NEW HI 60 MIN: CPT | Performed by: THORACIC SURGERY (CARDIOTHORACIC VASCULAR SURGERY)

## 2024-09-03 PROCEDURE — 3044F HG A1C LEVEL LT 7.0%: CPT | Performed by: THORACIC SURGERY (CARDIOTHORACIC VASCULAR SURGERY)

## 2024-09-03 PROCEDURE — 3061F NEG MICROALBUMINURIA REV: CPT | Performed by: THORACIC SURGERY (CARDIOTHORACIC VASCULAR SURGERY)

## 2024-09-03 PROCEDURE — 3078F DIAST BP <80 MM HG: CPT | Performed by: THORACIC SURGERY (CARDIOTHORACIC VASCULAR SURGERY)

## 2024-09-03 PROCEDURE — 3048F LDL-C <100 MG/DL: CPT | Performed by: THORACIC SURGERY (CARDIOTHORACIC VASCULAR SURGERY)

## 2024-09-03 PROCEDURE — 4010F ACE/ARB THERAPY RXD/TAKEN: CPT | Performed by: THORACIC SURGERY (CARDIOTHORACIC VASCULAR SURGERY)

## 2024-09-03 PROCEDURE — 3008F BODY MASS INDEX DOCD: CPT | Performed by: THORACIC SURGERY (CARDIOTHORACIC VASCULAR SURGERY)

## 2024-09-03 PROCEDURE — 3074F SYST BP LT 130 MM HG: CPT | Performed by: THORACIC SURGERY (CARDIOTHORACIC VASCULAR SURGERY)

## 2024-09-03 PROCEDURE — 99215 OFFICE O/P EST HI 40 MIN: CPT | Performed by: THORACIC SURGERY (CARDIOTHORACIC VASCULAR SURGERY)

## 2024-09-08 NOTE — PROGRESS NOTES
Chief Complaint  Dyspnea    HPI:   Mr. Yuri Reynolds is an 64 y.o. male, who is a patient of Dr. Marc Harding, DO   I have been asked to see them by Dr. Portillo to evaluate multivessel coronary artery disease.     He has a history of atherosclerotic heart disease with remote inferior wall myocardial infarction 1999. He had an apical myocardial infarction in August 2003. He underwent angioplasty and stenting of the right coronary artery in 1999 and in 2003. A myocardial perfusion study in April 2015 was negative for ischemia with calculated LV ejection fraction of 65%. He has a history of essential hypertension, type 2 diabetes mellitus, and hyperlipidemia. He stopped smoking tobacco back in January 2017.      He previously noted some intermittent palpitations and a Holter monitor showed normal sinus rhythm with occasional PACs and PVCs. His symptoms correlated with the ectopy. He also had some exertional shortness breath in the setting of progressive weight gain. Extensive pulmonary evaluation was unremarkable. An echocardiogram in August 2017 showed normal LV systolic function and normal valvular structure and function. He was treated in 2019 for a bowel obstruction. He was managed conservatively and it resolved. In the process he was diagnosed with cirrhosis of the liver. He was drinking quite heavily at the time but has completely stopped using any alcohol. He also stopped smoking.       He was seen in the office April 10, 2024 and complained of some exertional shortness of breath, fatigue, and diaphoresis.  He was scheduled for an echocardiogram and Lexiscan myocardial perfusion study.  There were some delays in scheduling.  He was seen in consultation at [facility] on July 22, 2024.  He reported ongoing shortness of breath with lesser amounts of exertion.  He was also experiencing intermittent pressure discomfort in his upper chest over the previous few weeks.  He states on at least 2 occasions the chest  pressure was quite intense and prolonged.  At 1 point he thought he might be having a heart attack so he decided to come to the emergency department.  Initial EKG showed normal sinus rhythm with some lateral nonspecific ST-T wave changes.  Repeat EKG was normal.  High-sensitivity troponin levels 4 and 4.  CBC and CMP normal with exception of sodium 135.  CT of the chest showed nonenhancement of right lower lobe subsegmental branches suspicious for pulmonary emboli.  He was placed on a heparin drip and admitted to telemetry.  This was subsequently transitioned to [facility].   He denied any recent travel.  He had been active without limitations.  No recent trauma.       At the time of his office visit August 5, 2024 he noted intermittent exertional chest discomfort.  A Lexiscan myocardial perfusion study August 2, 2024 showed a moderate inferoapical myocardial infarction.  No myocardial ischemia.  Calculated LV ejection fraction of 64%.  He does have a prior history of apical myocardial infarction 1999 and inferior myocardial infarction in 2003.  Echocardiogram July 22, 2024 showed an estimated LV ejection fraction 55 to 60% with mild concentric left ventricular hypertrophy.  Trivial to 1+ tricuspid regurgitation with estimated RVSP 23 mmHg.       He has gone back to work but continues to avoid any heavy lifting.  He describes 3 different types of chest discomfort.  He again notes that if he overdoes it he does feel a pressure-like discomfort in his upper chest and this radiates into both jaws.  He also notes some discomfort in the back of his neck, discomfort going down into both forearms, as well as some discomfort in the mid upper abdomen.  No other exacerbating or relieving factors.  The symptoms have improved somewhat since he started on isosorbide mononitrate but he still is needed to significantly curtail his activities.  He describes some burning discomfort in his lower chest and upper abdomen when he wakes up  about 5 AM to go to the bathroom.  This is different than the symptoms described above.  He also notes some burning discomfort in his upper chest at times when he is eating.  He denies any shortness of breath at rest.    He denies any orthopnea, PND, or increasing peripheral edema.  He denies any palpitations, lightheadedness, near-syncope, or syncope.  He denies any fever, chills, or cough.  He denies any nausea or vomiting.  Lab studies July 26, 2024 showed a cholesterol 124 with HDL 31, LDL 57, and triglycerides 182.  Comprehensive metabolic profile was normal with exception of sodium 135.  Hemoglobin A1c 5.3.  CBC on July 22, 2024 was normal.  BNP was 18.  High-sensitivity troponin level 4.        Past Medical History:   Diagnosis Date    Alcohol abuse, uncomplicated 12/20/2022    Alcohol abuse    Coronary artery disease     Diabetes mellitus (Multi)     Hyperlipidemia     Hypertension     Old myocardial infarction     History of myocardial infarction    Other specified postprocedural states     History of cardiac cath    Personal history of other diseases of the nervous system and sense organs     History of eustachian tube dysfunction    Personal history of pulmonary embolism     History of pulmonary embolism       Past Surgical History:   Procedure Laterality Date    CARDIAC CATHETERIZATION N/A 8/30/2024    Procedure: Left Heart Cath, With LV;  Surgeon: Db Moser MD;  Location: [facility];  Service: Cardiovascular;  Laterality: N/A;  left heart cath at [facility] with Dr. Casillas or Dr. Moser this week. Pt to hold Eliquis and Metforming 48 hours prior. He was advised ot increase his Isosorbide to120 mg daily.Non-fasitng labs for BMP and CBC to be done prior.    OTHER SURGICAL HISTORY  10/20/2021    Percutaneous transluminal coronary angioplasty       Family History   Problem Relation Name Age of Onset    Diabetes Mother      Other (Other) Father          Arteriosclerotic  cardiovascular disease; cardiac disorder    Heart attack Father      Diabetes Sister      Other (Other) Other Grandfather         cardiac disorder       Social History     Socioeconomic History    Marital status:      Spouse name: Not on file    Number of children: Not on file    Years of education: Not on file    Highest education level: Not on file   Occupational History    Not on file   Tobacco Use    Smoking status: Former     Current packs/day: 0.00     Types: Cigarettes     Quit date:      Years since quittin.6     Passive exposure: Never    Smokeless tobacco: Never   Vaping Use    Vaping status: Never Used   Substance and Sexual Activity    Alcohol use: Not Currently    Drug use: Never    Sexual activity: Not on file   Other Topics Concern    Not on file   Social History Narrative    Not on file     Social Determinants of Health     Financial Resource Strain: Low Risk  (2024)    Overall Financial Resource Strain (CARDIA)     Difficulty of Paying Living Expenses: Not hard at all   Food Insecurity: Not on file   Transportation Needs: No Transportation Needs (2024)    PRAPARE - Transportation     Lack of Transportation (Medical): No     Lack of Transportation (Non-Medical): No   Physical Activity: Not on file   Stress: Not on file   Social Connections: Not on file   Intimate Partner Violence: Not on file   Housing Stability: Low Risk  (2024)    Housing Stability Vital Sign     Unable to Pay for Housing in the Last Year: No     Number of Times Moved in the Last Year: 1     Homeless in the Last Year: No       No Known Allergies    Outpatient Encounter Medications as of 9/3/2024   Medication Sig Dispense Refill    albuterol 90 mcg/actuation inhaler Inhale 2 puffs every 6 hours if needed for shortness of breath. 18 g 0    apixaban (Eliquis) 5 mg tablet Take 1 tablet (5 mg) by mouth 2 times a day. 180 tablet 0    aspirin 81 mg EC tablet Take 1 tablet (81 mg) by mouth once daily.       isosorbide mononitrate ER (Imdur) 120 mg 24 hr tablet Take 1 tablet (120 mg) by mouth once daily. Do not crush or chew. 90 tablet 3    lisinopril 10 mg tablet Take 1 tablet (10 mg) by mouth once daily. 90 tablet 3    metFORMIN  mg 24 hr tablet Take 1 tablet (500 mg) by mouth once daily in the evening. Take with meals. 90 tablet 0    metoprolol succinate XL (Toprol-XL) 50 mg 24 hr tablet Take 1 tablet (50 mg) by mouth once daily. 90 tablet 3    nitroglycerin (Nitrostat) 0.4 mg SL tablet Place 1 tablet (0.4 mg) under the tongue every 5 minutes if needed for chest pain. May repeat dose every 5 minutes for up to 3 doses total. 90 tablet 1    rosuvastatin (Crestor) 40 mg tablet Take 1 tablet (40 mg) by mouth once daily. 90 tablet 3    triamcinolone (Kenalog) 0.1 % cream Apply 1 Application topically 2 times a day as needed (hisotry of dermatitis). Monday - Friday only 45 g 0    [DISCONTINUED] isosorbide mononitrate ER (Imdur) 60 mg 24 hr tablet Take 1 tablet (60 mg) by mouth once daily. 90 tablet 0     No facility-administered encounter medications on file as of 9/3/2024.       Physical Exam  GENERAL:  Well developed, well nourished, in no acute distress.  CHEST:  Symmetric and nontender.  NEURO/PSYCH:  Alert and oriented times three with approppriate behavior and responses.  LUNGS:  Clear to auscultation bilaterally, normal respiratory effort.  HEART:  Rate and rhythm regular with no evident murmur, no gallop appreciated.                   There are no rubs, clicks or heaves.    Lab Results   Component Value Date    WBC 5.4 08/28/2024    HGB 14.1 08/28/2024    HCT 41.6 08/28/2024    MCV 84 08/28/2024    PLT 81 (L) 08/28/2024     Lab Results   Component Value Date    GLUCOSE 74 08/28/2024    CALCIUM 9.4 08/28/2024     08/28/2024    K 4.1 08/28/2024    CO2 24 08/28/2024     08/28/2024    BUN 17 08/28/2024    CREATININE 0.95 08/28/2024       Encounter Date: 08/30/24   ECG 12 lead STAT   Result Value     Ventricular Rate 74    Atrial Rate 74    DC Interval 200    QRS Duration 84    QT Interval 368    QTC Calculation(Bazett) 408    P Axis 61    R Axis -20    T Axis 14    QRS Count 13    Q Onset 224    P Onset 124    P Offset 180    T Offset 408    QTC Fredericia 394    Narrative    Normal sinus rhythm  Normal ECG  When compared with ECG of 30-AUG-2024 11:41, (unconfirmed)  No significant change was found  Confirmed by Ramesh Lacey ([id number]) on 8/31/2024 2:18:31 PM     TTE 07/22/2024  CONCLUSIONS:   1. The left ventricular systolic function is normal, with a visually estimated ejection fraction of 55-60%.   2. Spectral Doppler shows an impaired relaxation pattern of left ventricular diastolic filling.   3. There is normal right ventricular global systolic function.   4. Trace to mild tricuspid regurgitation.    Left heart cath 08/30/2024  CONCLUSIONS:   1. Proximal and distal Left Main: 10% stenosis.   2. Mid LAD Lesion: The percent stenosis is 50%.   3. Circumflex Coronary Artery: fills via collaterals.   4. Mid CX Lesion: The percent stenosis is 100%.   5. Right Coronary Artery: contains patent previously placed stents and fills via collaterals.   6. Mid RCA Lesion: The percent stenosis is 95%.   7. Mid RCA Lesion: The percent stenosis is 80%.   8. Mid PLVB RCA Lesion: The percent stenosis is 50 %.   9. The Left Ventricular Ejection Fraction is 60%.    Assessment and Plan:   Mr. Yuri Reynolds is an 64 y.o. male who presents with multivessel coronary artery disease.  This is associated with chest discomfort and shortness of breath    We had a detailed discussion regarding the indications for intervention on his coronary artery disease.  This included percutaneous as well as open surgical approaches. I do believe that surgical coronary revascularization is in his best interest, given the coronary anatomy and presentation of his symptoms.  He understands that the goal of this procedure will be to relieve symptoms if  present, preserve left ventricular function, prevent future myocardial infarction, and prolong life.  I discussed the specific risks of bleeding, infection, transfusion, myocardial infarction, stroke, renal failure, up to and including death.  In further evaluation we will obtain  Gi and hematology consultation for Hx of cirrhosis and pulmonary embolism  He  have agreed to surgery and signed to electronic consent form here in the office.      The surgery will be scheduled within 2 weeks after completion of all investigations and consultations.    Freeman Gan MD.

## 2024-09-11 ENCOUNTER — OFFICE VISIT (OUTPATIENT)
Dept: GASTROENTEROLOGY | Facility: HOSPITAL | Age: 64
End: 2024-09-11
Payer: COMMERCIAL

## 2024-09-11 VITALS
WEIGHT: 233.1 LBS | DIASTOLIC BLOOD PRESSURE: 66 MMHG | SYSTOLIC BLOOD PRESSURE: 124 MMHG | HEART RATE: 80 BPM | RESPIRATION RATE: 20 BRPM | TEMPERATURE: 97.8 F | OXYGEN SATURATION: 98 % | BODY MASS INDEX: 31.61 KG/M2

## 2024-09-11 DIAGNOSIS — K74.60 LIVER DISEASE, CHRONIC, WITH CIRRHOSIS (MULTI): ICD-10-CM

## 2024-09-11 DIAGNOSIS — K76.9 LIVER DISEASE, CHRONIC, WITH CIRRHOSIS (MULTI): ICD-10-CM

## 2024-09-11 PROCEDURE — 99214 OFFICE O/P EST MOD 30 MIN: CPT | Performed by: STUDENT IN AN ORGANIZED HEALTH CARE EDUCATION/TRAINING PROGRAM

## 2024-09-11 PROCEDURE — 3048F LDL-C <100 MG/DL: CPT | Performed by: STUDENT IN AN ORGANIZED HEALTH CARE EDUCATION/TRAINING PROGRAM

## 2024-09-11 PROCEDURE — 99204 OFFICE O/P NEW MOD 45 MIN: CPT | Performed by: STUDENT IN AN ORGANIZED HEALTH CARE EDUCATION/TRAINING PROGRAM

## 2024-09-11 PROCEDURE — 4010F ACE/ARB THERAPY RXD/TAKEN: CPT | Performed by: STUDENT IN AN ORGANIZED HEALTH CARE EDUCATION/TRAINING PROGRAM

## 2024-09-11 PROCEDURE — 3078F DIAST BP <80 MM HG: CPT | Performed by: STUDENT IN AN ORGANIZED HEALTH CARE EDUCATION/TRAINING PROGRAM

## 2024-09-11 PROCEDURE — 1036F TOBACCO NON-USER: CPT | Performed by: STUDENT IN AN ORGANIZED HEALTH CARE EDUCATION/TRAINING PROGRAM

## 2024-09-11 PROCEDURE — 3044F HG A1C LEVEL LT 7.0%: CPT | Performed by: STUDENT IN AN ORGANIZED HEALTH CARE EDUCATION/TRAINING PROGRAM

## 2024-09-11 PROCEDURE — 3074F SYST BP LT 130 MM HG: CPT | Performed by: STUDENT IN AN ORGANIZED HEALTH CARE EDUCATION/TRAINING PROGRAM

## 2024-09-11 PROCEDURE — 3061F NEG MICROALBUMINURIA REV: CPT | Performed by: STUDENT IN AN ORGANIZED HEALTH CARE EDUCATION/TRAINING PROGRAM

## 2024-09-11 ASSESSMENT — PAIN SCALES - GENERAL: PAINLEVEL: 0-NO PAIN

## 2024-09-11 NOTE — PATIENT INSTRUCTIONS
If you have any questions or need assistance, please don't hesitate to contact us.     Our offices are located at [facility].  Please use the numbers below when calling.   Dr. Everett: Office [phone] Fax: [phone]  Hepatology Nurse Coordinator: Mariah JUAN [phone]       [phone]   (See below for department name when scheduling)      PLAN OF CARE:    TEST DUE    [x]   LABS  Type: LIVER CANCER SCREENING: Patients with advanced fibrosis or cirrhosis are at increased risk for liver cancer.  Successful treatment of liver cancer depends on early detection.  You should have regular interval screening for liver cancer every 6 months.  A blood test called Alpha-fetoprotein (AFP) and ultrasound of the liver is an important part of liver cancer screening to detect tumors.  You should also expect to have a follow up appointment with your liver doctor every 6 months Due : Today and March    [x]   IMAGING (Department Radiology)  Type: US Due : First available and March    [x]   ENDOSCOPY (Department Gastro)  Type: EGD (UPPER ENDOSCOPY) Due : First available    [x]   FOLLOW UP  (Department Gastro)   Due : March         Dr. Everett  Locations: WED: Replaced by [facility] ([facility] 6th Floor) 8:00  AM - 12:00 PM  THURS: [facility] ([facility]) 8:00 AM - 4:20 PM       You should have no more than 2,000 mg (sodium/salt) per day.  1 teaspoon of salt = about 2,300 mg.  An average sandwich is 1,522mg. Despite what people think, 70% of sodium intake comes from packaged and prepared foods-not from table salt added to food.  Read labels and choose foods low in sodium.  Using label information on food packages will help make the best low salt choices.  Avoid salt by preparing your own foods when you can, using fresh ingredients.  Season home cooking with fresh herbs and spices.     Foods to AVOID  Eliminate the salt shaker  Buy fresh  Avoid canned soups, entrees, vegetables, boxed, and frozen  foods  Avoid bread, rolls, bagels...  Cold cuts, cured meats  Pizza  Poultry should be fresh, choose 15% solution  Sandwiches (bread and lunch meats)  Consider your condiments  Restaurant food

## 2024-09-11 NOTE — PROGRESS NOTES
[facility] HEPATOLOGY CLINIC NOTE     History of Present Illness:   Yuri Reynolds is a 64 y.o. male who presents to clinic for evaluation of alcohol-related liver disease and compensated cirrhosis.  The patient is undergoing a coronary artery bypass graft surgery and presents for preoperative clearance.    The patient last saw his cardiologist on 9/3/2024 for atherosclerotic heart disease.  He does have a history of an inferior wall myocardial infarction and 1999 as well as an apical myocardial infarction in 2003.  He underwent coronary angioplasty and stenting in 1999 and 2003.  He recently presented in August for intermittent exertional chest pain.  He did have a myocardial perfusion study that showed evidence of a myocardial infarction but no myocardial ischemia.  Prior echocardiogram demonstrated an left ventricular ejection fraction of 55 to 60% with left ventricular hypertrophy.  There is also mild tricuspid regurgitation and RVSP was 23.  He later underwent a cardiac cath on 8/30 that demonstrates a 50% lesion in the mid LAD and 100% stenosis in the mid circumflex artery as well as greater than 80-90% in the right coronary artery.  The patient also had a CT angiogram of the chest on 7/221 that demonstrated findings suspicious of a pulmonary embolism.  He is currently on apixaban.    The patient's current weight is 233 pounds, the most he is ever weighed is 235 pounds.The patient also has a history of diabetes mellitus, hyperlipidemia, hypertension and obesity.  He recognizes that there is some dietary indiscretion and recognizes he does need to lose weight.  Fortunately he was able to quit smoking over 5 years ago.  He is currently on medications to control his hypertension, hyperlipidemia and heart disease.  He is currently on metformin and does not take insulin.  His last hemoglobin A1c was 5.3%.    He does have a history of heavy alcohol use.  His last drink was 7-8 years ago and does comment  that he consumed alcohol daily, often 5-7 drinks of whiskey that he would pour himself.  He has never been to an outpatient treatment program, he has never been admitted to the hospital with alcohol withdrawal.  He has never had a DUI or other social complications of alcohol use.  The patient does not have any history of substance abuse.    The patient has never had any decompensating events, denying any history of variceal hemorrhage, ascites or significant hepatic encephalopathy.  The past 5 years the patient has endorsed some brain fog that occurs occasionally during the day.  He has never had insomnia, episodes of confusion or difficulty driving.  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  He has 1-2 bowel movements per day there is never been hepatic encephalopathy document his chart he has not had an ammonia level obtained.    The patient did have an upper endoscopy in 2019 that demonstrated no esophageal varices.  The patient also had a FibroScan performed in 2019 that demonstrated a mean liver stiffness of 62.1 kPa.  His CAP score was 314.    Review of Systems  Review of systems otherwise negative.     Social History   reports that he quit smoking about 8 years ago. His smoking use included cigarettes. He has never been exposed to tobacco smoke. He has never used smokeless tobacco. He reports that he does not currently use alcohol. He reports that he does not use drugs.     Family History  family history includes Diabetes in his mother and sister; Heart attack in his father; Other in his father and another family member.  No other family history of liver disease.    Allergies  No Known Allergies    Medications  Current Outpatient Medications   Medication Instructions    albuterol 90 mcg/actuation inhaler 2 puffs, inhalation, Every 6 hours PRN    apixaban (ELIQUIS) 5 mg, oral, 2 times daily    aspirin 81 mg EC tablet 1 tablet, oral, Daily    isosorbide mononitrate ER (IMDUR) 120  mg, oral, Daily, Do not crush or chew.    lisinopril 10 mg, oral, Daily    metFORMIN XR (GLUCOPHAGE-XR) 500 mg, oral, Daily with evening meal    metoprolol succinate XL (TOPROL-XL) 50 mg, oral, Daily    nitroglycerin (NITROSTAT) 0.4 mg, sublingual, Every 5 min PRN, May repeat dose every 5 minutes for up to 3 doses total.    rosuvastatin (CRESTOR) 40 mg, oral, Daily    triamcinolone (Kenalog) 0.1 % cream 1 Application, Topical, 2 times daily PRN, Monday - Friday only        Visit Vitals  /66   Pulse 80   Temp 36.6 °C (97.8 °F)   Resp 20      Physical Exam    Middle-age man in no acute distress, nontoxic-appearing, well-nourished and developed  BMI is 31.6  Extraocular's are intact, there is no conjunctival icterus or jaundice  Heart is regular rate and rhythm, there are no murmurs  Lungs are clear to auscultation bilaterally  His abdomen is distended but soft nontender palpation there is a palpable splenomegaly, I am able to palpate his liver and it is nontender  There is no lower extremity edema  No asterixis  No palmar erythema, there is a small telangiectasia on his right nose  No spider angiomata    Labs    Lab Results   Component Value Date    WBC 5.4 08/28/2024    HGB 14.1 08/28/2024    HCT 41.6 08/28/2024    MCV 84 08/28/2024    PLT 81 (L) 08/28/2024     Lab Results   Component Value Date    GLUCOSE 74 08/28/2024    CALCIUM 9.4 08/28/2024     08/28/2024    K 4.1 08/28/2024    CO2 24 08/28/2024     08/28/2024    BUN 17 08/28/2024    CREATININE 0.95 08/28/2024     Lab Results   Component Value Date    ALT 35 07/26/2024    AST 27 07/26/2024    ALKPHOS 82 07/26/2024    BILITOT 1.2 07/26/2024     Lab Results   Component Value Date    INR 1.2 (H) 10/01/2020    INR 1.2 (H) 03/30/2020    INR 1.2 (H) 09/30/2019    PROTIME 13.5 (H) 10/01/2020    PROTIME 13.3 (H) 03/30/2020    PROTIME 13.3 (H) 09/30/2019     ASSESSMENT AND PLAN:    # Compensated cirrhosis  # Alcohol-related liver disease  # Metabolic  risk factors    The patient is well compensated liver disease without any signs of decompensating events.  The most likely etiology of his liver disease is alcohol related liver disease with metabolic risk factors including diabetes, hyperlipidemia and hypertension which are all well-controlled.  The patient does have a history of obesity however his BMI is just above 30.  Based on review of his labs, physical exam and history, the patient is low risk for complications after surgery.    Perioperative mortality would be 0.9% at 7 days, 3.9% at 30 days, and 6.3% at 90 days postoperation.     This is based a Post-operative mortality risk calculator developed by [facility]. [email or web address]-[email or web address]-cirrhosis/[email or web address]#asa    We discussed the very small probability of decompensation following his surgery although the coronary artery bypass is not an intra-abdominal procedure it is still a fairly major surgery and the risks of liver decompensation when discussed with the patient today's clinic visit.    # At risk for hepatocellular carcinoma  Will order US and AFP, no required before surgery.     # At risk for esophageal varices   Will order EGD for screening to be done in 6 months.     # Follow Up  Will see the patient in 6 months with MELD, US, AFP, and EGD for routine follow up.     Dennis Everett MD  [facility], [facility]               Ear Star Wedge Flap Text: The defect edges were debeveled with a #15 blade scalpel.  Given the location of the defect and the proximity to free margins (helical rim) an ear star wedge flap was deemed most appropriate.  Using a sterile surgical marker, the appropriate flap was drawn incorporating the defect and placing the expected incisions between the helical rim and antihelix where possible.  The area thus outlined was incised through and through with a #15 scalpel blade.

## 2024-09-16 ENCOUNTER — LAB (OUTPATIENT)
Dept: LAB | Facility: CLINIC | Age: 64
End: 2024-09-16
Payer: COMMERCIAL

## 2024-09-16 ENCOUNTER — APPOINTMENT (OUTPATIENT)
Dept: HEMATOLOGY/ONCOLOGY | Facility: CLINIC | Age: 64
End: 2024-09-16
Payer: COMMERCIAL

## 2024-09-16 ENCOUNTER — OFFICE VISIT (OUTPATIENT)
Dept: HEMATOLOGY/ONCOLOGY | Facility: CLINIC | Age: 64
End: 2024-09-16
Payer: COMMERCIAL

## 2024-09-16 VITALS
DIASTOLIC BLOOD PRESSURE: 66 MMHG | SYSTOLIC BLOOD PRESSURE: 122 MMHG | BODY MASS INDEX: 32.2 KG/M2 | HEART RATE: 72 BPM | WEIGHT: 237.44 LBS | OXYGEN SATURATION: 96 % | RESPIRATION RATE: 16 BRPM | TEMPERATURE: 98.1 F

## 2024-09-16 DIAGNOSIS — I26.99 PULMONARY EMBOLISM WITHOUT ACUTE COR PULMONALE, UNSPECIFIED CHRONICITY, UNSPECIFIED PULMONARY EMBOLISM TYPE (MULTI): ICD-10-CM

## 2024-09-16 PROCEDURE — 3048F LDL-C <100 MG/DL: CPT | Performed by: INTERNAL MEDICINE

## 2024-09-16 PROCEDURE — 3074F SYST BP LT 130 MM HG: CPT | Performed by: INTERNAL MEDICINE

## 2024-09-16 PROCEDURE — 85306 CLOT INHIBIT PROT S FREE: CPT

## 2024-09-16 PROCEDURE — 3061F NEG MICROALBUMINURIA REV: CPT | Performed by: INTERNAL MEDICINE

## 2024-09-16 PROCEDURE — 36415 COLL VENOUS BLD VENIPUNCTURE: CPT

## 2024-09-16 PROCEDURE — 3044F HG A1C LEVEL LT 7.0%: CPT | Performed by: INTERNAL MEDICINE

## 2024-09-16 PROCEDURE — 3078F DIAST BP <80 MM HG: CPT | Performed by: INTERNAL MEDICINE

## 2024-09-16 PROCEDURE — 85303 CLOT INHIBIT PROT C ACTIVITY: CPT

## 2024-09-16 PROCEDURE — 4010F ACE/ARB THERAPY RXD/TAKEN: CPT | Performed by: INTERNAL MEDICINE

## 2024-09-16 PROCEDURE — 99204 OFFICE O/P NEW MOD 45 MIN: CPT | Performed by: INTERNAL MEDICINE

## 2024-09-16 PROCEDURE — 85301 ANTITHROMBIN III ANTIGEN: CPT

## 2024-09-16 PROCEDURE — 99214 OFFICE O/P EST MOD 30 MIN: CPT | Performed by: INTERNAL MEDICINE

## 2024-09-16 ASSESSMENT — PAIN SCALES - GENERAL: PAINLEVEL: 0-NO PAIN

## 2024-09-16 NOTE — PROGRESS NOTES
Patient ID: Yuri Reynolds is a 64 y.o. male.  Referring Physician: Marc Harding DO  [address] [place] [address]  Primary Care Provider: Marc Harding DO      Subjective    HPI  Mr. Yuri Reynolds is a 63 y/o M presenting for initial consultation at the request of Dr. Marc Harding for history of pulmonary embolism. Patient had CT of the chest 8/30/24. It showed decreased vascular perfusion and moderate atherosclerotic calcifications of the coronary arteries. CT angio on the same day did not show any pulmonary embolism. Lower extremity venous duplex from 8/30/24 as well. However, on 7/21/24 he was diagnosed with segmental posterior right lower lobe suspicious for pulmonary embolism along with cirrhotic liver. He has since been on Eliquis.     The patient had a pulmonary embolism 25 years ago. No other history of blood clots. There is no  family history of blood clots, strokes, or cancers, although his father had heart disease. He is a former smoker, having quit 7 years ago. The patient reports experiencing chest pain and shortness of breath, particularly when walking, which leads to pain. He is largely sedentary and has had cirrhosis of the liver for many years. He has three daughters who had healthy pregnancies and no issues with blood clots. The patient denies any occurrence of hematochezia.    Patient's past medical history, surgical history, family history and social history reviewed.    Review of Systems:   Review of Systems:    Positive per HPI, otherwise negative.       Objective   Vitals:    09/16/24 1159   BP: 122/66   Pulse: 72   Resp: 16   Temp: 36.7 °C (98.1 °F)   SpO2: 96%        Physical Exam  Gen: appears well in clinic, NAD  HEENT: atraumatic head, normocephalic, EOMI, conjunctiva normal  LUNG: no increased WOB, CTAB  CV: No JVD. RRR  GI: soft, NT, ND  LE: no LE edema  Skin: no obvious rashes or lesions on visible skin  Neuro: interactive, no focal deficits noted  Psych: normal mood and  affect  Performance Status:  Symptomatic; fully ambulatory  Labs/Imaging/Pathology: personally reviewed reports and images in Epic electronic medical record system. Pertinent results as it related to the plan represented in below in assessment and plan.   Assessment/Plan    History of questionable pulmonary embolism July 2024   History of pulmonary embolism 25 years ago  - Patient reports he developed multiple small pulmonary emboli post stent placement and no furhter clots since  - 7/21/24 finding suspicious for PE given non enhancment of RLL segmental branches however no improvement after starting eliquis and shortly after diagnosed with significan CAD  - now thought his symptoms were more likely coronary disease   - Follow-up imaging 8/30/24 without active clot  - given no active clot and questionable dx, we discussed ultimately his need for intervention with bypass for his CAD takes precedence and he could safely hold AC prior to surgery  -no contraindications from hematology standpoint to proceed with upcoming CABG  - We will complete a hypercoagulable workup to better understand clotting risk and given first clot was before age 46 however minimal suspicion given no family hx and prior clot was over 25 years ago with no further clots since  - RTC with me likely as needed, however, we will call him with results from labs today    RTC as needed. This note has been transcribed using a medical scribe and there is a possibility of unintentional typing misprints.    Diagnoses and all orders for this visit:  Pulmonary embolism without acute cor pulmonale, unspecified chronicity, unspecified pulmonary embolism type (Multi)  -     Referral to Hematology and Oncology  -     Protein C Activity; Future  -     Protein S Activity; Future  -     Prothrombin Gene Mutation Analysis; Future  -     Factor V Leiden; Future  -     Antithrombin Antigen; Future    Krissy Abraham MD  Hematology/Oncology  [facility] at   [facility] Attestation  By signing my name below, I [employer] attest that this documentation has been prepared under the direction and in the presence of Krissy Abraham MD.    Time Spent  Prep time on day of patient encounter: 5 minutes  Time spent directly with patient, family or caregiver: 22 minutes  Additional Time Spent on Patient Care Activities: 7 minutes  Documentation Time: 7 minutes  Other Time Spent: 0 minutes  Total: 41 minutes

## 2024-09-17 LAB
PROT C ACT/NOR PPP CHRO: 101 % ACTIVITY (ref 70–150)
PROT S ACT/NOR PPP: 140 % ACTIVITY (ref 64–150)

## 2024-09-19 ENCOUNTER — APPOINTMENT (OUTPATIENT)
Dept: HEMATOLOGY/ONCOLOGY | Facility: CLINIC | Age: 64
End: 2024-09-19
Payer: COMMERCIAL

## 2024-09-19 LAB — AT III AG ACT/NOR PPP IA: 112 % (ref 82–136)

## 2024-09-20 ENCOUNTER — HOSPITAL ENCOUNTER (OUTPATIENT)
Facility: HOSPITAL | Age: 64
Setting detail: SURGERY ADMIT
End: 2024-09-20
Attending: THORACIC SURGERY (CARDIOTHORACIC VASCULAR SURGERY) | Admitting: THORACIC SURGERY (CARDIOTHORACIC VASCULAR SURGERY)
Payer: COMMERCIAL

## 2024-09-20 ENCOUNTER — LAB (OUTPATIENT)
Dept: LAB | Facility: HOSPITAL | Age: 64
End: 2024-09-20
Payer: COMMERCIAL

## 2024-09-20 ENCOUNTER — HOSPITAL ENCOUNTER (OUTPATIENT)
Dept: OPERATING ROOM | Facility: HOSPITAL | Age: 64
Discharge: HOME | End: 2024-09-20
Payer: COMMERCIAL

## 2024-09-20 ENCOUNTER — HOSPITAL ENCOUNTER (OUTPATIENT)
Dept: OPERATING ROOM | Facility: HOSPITAL | Age: 64
Discharge: HOME | End: 2024-09-20

## 2024-09-20 DIAGNOSIS — I25.10 CORONARY ARTERY DISEASE INVOLVING NATIVE CORONARY ARTERY OF NATIVE HEART, UNSPECIFIED WHETHER ANGINA PRESENT: ICD-10-CM

## 2024-09-20 DIAGNOSIS — I20.0 ANGINA PECTORIS, UNSTABLE (MULTI): Primary | ICD-10-CM

## 2024-09-20 LAB
ABO GROUP (TYPE) IN BLOOD: NORMAL
ALBUMIN SERPL BCP-MCNC: 4.8 G/DL (ref 3.4–5)
ALBUMIN SERPL BCP-MCNC: 5 G/DL (ref 3.4–5)
ALP SERPL-CCNC: 75 U/L (ref 33–136)
ALP SERPL-CCNC: 79 U/L (ref 33–136)
ALT SERPL W P-5'-P-CCNC: 29 U/L (ref 10–52)
ALT SERPL W P-5'-P-CCNC: 30 U/L (ref 10–52)
ANION GAP SERPL CALC-SCNC: 11 MMOL/L (ref 10–20)
ANTIBODY SCREEN: NORMAL
APPEARANCE UR: CLEAR
APTT PPP: 39 SECONDS (ref 27–38)
AST SERPL W P-5'-P-CCNC: 23 U/L (ref 9–39)
AST SERPL W P-5'-P-CCNC: 23 U/L (ref 9–39)
BASOPHILS # BLD AUTO: 0.02 X10*3/UL (ref 0–0.1)
BASOPHILS NFR BLD AUTO: 0.4 %
BILIRUB DIRECT SERPL-MCNC: 0.2 MG/DL (ref 0–0.3)
BILIRUB SERPL-MCNC: 1 MG/DL (ref 0–1.2)
BILIRUB SERPL-MCNC: 1 MG/DL (ref 0–1.2)
BILIRUB UR STRIP.AUTO-MCNC: NEGATIVE MG/DL
BUN SERPL-MCNC: 20 MG/DL (ref 6–23)
CALCIUM SERPL-MCNC: 9.9 MG/DL (ref 8.6–10.3)
CHLORIDE SERPL-SCNC: 103 MMOL/L (ref 98–107)
CO2 SERPL-SCNC: 24 MMOL/L (ref 21–32)
COLOR UR: ABNORMAL
CREAT SERPL-MCNC: 0.97 MG/DL (ref 0.5–1.3)
EGFRCR SERPLBLD CKD-EPI 2021: 88 ML/MIN/1.73M*2
EOSINOPHIL # BLD AUTO: 0.11 X10*3/UL (ref 0–0.7)
EOSINOPHIL NFR BLD AUTO: 1.9 %
ERYTHROCYTE [DISTWIDTH] IN BLOOD BY AUTOMATED COUNT: 13.7 % (ref 11.5–14.5)
GLUCOSE SERPL-MCNC: 113 MG/DL (ref 74–99)
GLUCOSE UR STRIP.AUTO-MCNC: NORMAL MG/DL
HCT VFR BLD AUTO: 41.7 % (ref 41–52)
HGB BLD-MCNC: 15 G/DL (ref 13.5–17.5)
IMM GRANULOCYTES # BLD AUTO: 0.01 X10*3/UL (ref 0–0.7)
IMM GRANULOCYTES NFR BLD AUTO: 0.2 % (ref 0–0.9)
INR PPP: 1.3 (ref 0.9–1.1)
KETONES UR STRIP.AUTO-MCNC: NEGATIVE MG/DL
LEUKOCYTE ESTERASE UR QL STRIP.AUTO: NEGATIVE
LYMPHOCYTES # BLD AUTO: 1.26 X10*3/UL (ref 1.2–4.8)
LYMPHOCYTES NFR BLD AUTO: 22.1 %
MCH RBC QN AUTO: 28.9 PG (ref 26–34)
MCHC RBC AUTO-ENTMCNC: 36 G/DL (ref 32–36)
MCV RBC AUTO: 80 FL (ref 80–100)
MONOCYTES # BLD AUTO: 0.38 X10*3/UL (ref 0.1–1)
MONOCYTES NFR BLD AUTO: 6.7 %
NEUTROPHILS # BLD AUTO: 3.91 X10*3/UL (ref 1.2–7.7)
NEUTROPHILS NFR BLD AUTO: 68.7 %
NITRITE UR QL STRIP.AUTO: NEGATIVE
NRBC BLD-RTO: 0 /100 WBCS (ref 0–0)
PH UR STRIP.AUTO: 6.5 [PH]
PLATELET # BLD AUTO: 86 X10*3/UL (ref 150–450)
POTASSIUM SERPL-SCNC: 4.2 MMOL/L (ref 3.5–5.3)
PROT SERPL-MCNC: 7.4 G/DL (ref 6.4–8.2)
PROT SERPL-MCNC: 7.4 G/DL (ref 6.4–8.2)
PROT UR STRIP.AUTO-MCNC: NEGATIVE MG/DL
PROTHROMBIN TIME: 15 SECONDS (ref 9.8–12.8)
RBC # BLD AUTO: 5.19 X10*6/UL (ref 4.5–5.9)
RBC # UR STRIP.AUTO: NEGATIVE /UL
RH FACTOR (ANTIGEN D): NORMAL
SODIUM SERPL-SCNC: 134 MMOL/L (ref 136–145)
SP GR UR STRIP.AUTO: 1.01
UROBILINOGEN UR STRIP.AUTO-MCNC: ABNORMAL MG/DL
WBC # BLD AUTO: 5.7 X10*3/UL (ref 4.4–11.3)

## 2024-09-20 PROCEDURE — 84075 ASSAY ALKALINE PHOSPHATASE: CPT | Performed by: NURSE PRACTITIONER

## 2024-09-20 PROCEDURE — 36415 COLL VENOUS BLD VENIPUNCTURE: CPT | Performed by: NURSE PRACTITIONER

## 2024-09-20 PROCEDURE — 86923 COMPATIBILITY TEST ELECTRIC: CPT

## 2024-09-20 PROCEDURE — 85025 COMPLETE CBC W/AUTO DIFF WBC: CPT

## 2024-09-20 PROCEDURE — 86901 BLOOD TYPING SEROLOGIC RH(D): CPT

## 2024-09-20 PROCEDURE — 85610 PROTHROMBIN TIME: CPT

## 2024-09-20 PROCEDURE — 84075 ASSAY ALKALINE PHOSPHATASE: CPT

## 2024-09-20 PROCEDURE — 81003 URINALYSIS AUTO W/O SCOPE: CPT

## 2024-09-20 RX ORDER — CEFAZOLIN SODIUM 2 G/50ML
2 SOLUTION INTRAVENOUS ONCE
Status: DISCONTINUED | OUTPATIENT
Start: 2024-09-20 | End: 2024-09-23 | Stop reason: HOSPADM

## 2024-09-20 RX ORDER — SODIUM CHLORIDE, SODIUM LACTATE, POTASSIUM CHLORIDE, CALCIUM CHLORIDE 600; 310; 30; 20 MG/100ML; MG/100ML; MG/100ML; MG/100ML
20 INJECTION, SOLUTION INTRAVENOUS CONTINUOUS
Status: DISCONTINUED | OUTPATIENT
Start: 2024-09-20 | End: 2024-09-23 | Stop reason: HOSPADM

## 2024-09-20 NOTE — PREPROCEDURE INSTRUCTIONS

## 2024-09-22 ENCOUNTER — ANESTHESIA EVENT (OUTPATIENT)
Dept: OPERATING ROOM | Facility: HOSPITAL | Age: 64
End: 2024-09-22
Payer: COMMERCIAL

## 2024-09-23 ENCOUNTER — HOSPITAL ENCOUNTER (INPATIENT)
Facility: HOSPITAL | Age: 64
LOS: 5 days | Discharge: HOME HEALTH | DRG: 236 | End: 2024-09-28
Attending: THORACIC SURGERY (CARDIOTHORACIC VASCULAR SURGERY) | Admitting: NURSE PRACTITIONER
Payer: COMMERCIAL

## 2024-09-23 ENCOUNTER — ANESTHESIA (OUTPATIENT)
Dept: OPERATING ROOM | Facility: HOSPITAL | Age: 64
End: 2024-09-23
Payer: COMMERCIAL

## 2024-09-23 ENCOUNTER — APPOINTMENT (OUTPATIENT)
Dept: CARDIOLOGY | Facility: HOSPITAL | Age: 64
DRG: 236 | End: 2024-09-23
Payer: COMMERCIAL

## 2024-09-23 ENCOUNTER — APPOINTMENT (OUTPATIENT)
Dept: RADIOLOGY | Facility: HOSPITAL | Age: 64
DRG: 236 | End: 2024-09-23
Payer: COMMERCIAL

## 2024-09-23 ENCOUNTER — HOSPITAL ENCOUNTER (INPATIENT)
Dept: OPERATING ROOM | Facility: HOSPITAL | Age: 64
Discharge: HOME | DRG: 236 | End: 2024-09-23
Payer: COMMERCIAL

## 2024-09-23 ENCOUNTER — ANESTHESIA EVENT (OUTPATIENT)
Dept: OPERATING ROOM | Facility: HOSPITAL | Age: 64
End: 2024-09-23
Payer: COMMERCIAL

## 2024-09-23 VITALS
RESPIRATION RATE: 16 BRPM | OXYGEN SATURATION: 96 % | HEART RATE: 71 BPM | BODY MASS INDEX: 31.53 KG/M2 | SYSTOLIC BLOOD PRESSURE: 123 MMHG | TEMPERATURE: 97 F | WEIGHT: 232.81 LBS | HEIGHT: 72 IN | DIASTOLIC BLOOD PRESSURE: 68 MMHG

## 2024-09-23 DIAGNOSIS — I25.118 CORONARY ARTERY DISEASE OF NATIVE ARTERY OF NATIVE HEART WITH STABLE ANGINA PECTORIS: ICD-10-CM

## 2024-09-23 DIAGNOSIS — R07.9 CHEST PAIN, UNSPECIFIED TYPE: Primary | ICD-10-CM

## 2024-09-23 DIAGNOSIS — I20.0 ANGINA PECTORIS, UNSTABLE (MULTI): ICD-10-CM

## 2024-09-23 LAB
ABO GROUP (TYPE) IN BLOOD: NORMAL
ACT BLD: 115 SEC (ref 82–174)
ACT BLD: 118 SEC (ref 82–174)
ACT BLD: 508 SEC (ref 82–174)
ACT BLD: 514 SEC (ref 82–174)
ACT BLD: 682 SEC (ref 82–174)
ACT BLD: 895 SEC (ref 82–174)
ACT BLD: NORMAL S
ALBUMIN SERPL BCP-MCNC: 3.7 G/DL (ref 3.4–5)
ANION GAP BLDA CALCULATED.4IONS-SCNC: 10 MMO/L (ref 10–25)
ANION GAP BLDA CALCULATED.4IONS-SCNC: 10 MMO/L (ref 10–25)
ANION GAP BLDA CALCULATED.4IONS-SCNC: 7 MMO/L (ref 10–25)
ANION GAP BLDA CALCULATED.4IONS-SCNC: 8 MMO/L (ref 10–25)
ANION GAP BLDV CALCULATED.4IONS-SCNC: 12 MMOL/L (ref 10–25)
ANION GAP BLDV CALCULATED.4IONS-SCNC: 6 MMOL/L (ref 10–25)
ANION GAP SERPL CALC-SCNC: 12 MMOL/L (ref 10–20)
APTT PPP: 28 SECONDS (ref 27–38)
BASE EXCESS BLDA CALC-SCNC: -0.8 MMOL/L (ref -2–3)
BASE EXCESS BLDA CALC-SCNC: -3.2 MMOL/L (ref -2–3)
BASE EXCESS BLDA CALC-SCNC: -3.6 MMOL/L (ref -2–3)
BASE EXCESS BLDA CALC-SCNC: -3.8 MMOL/L (ref -2–3)
BASE EXCESS BLDA CALC-SCNC: -5.1 MMOL/L (ref -2–3)
BASE EXCESS BLDA CALC-SCNC: 1.7 MMOL/L (ref -2–3)
BASE EXCESS BLDA CALC-SCNC: 2.1 MMOL/L (ref -2–3)
BASE EXCESS BLDA CALC-SCNC: 2.1 MMOL/L (ref -2–3)
BASE EXCESS BLDV CALC-SCNC: -5.8 MMOL/L (ref -2–3)
BASE EXCESS BLDV CALC-SCNC: 0 MMOL/L (ref -2–3)
BODY TEMPERATURE: ABNORMAL
BUN SERPL-MCNC: 14 MG/DL (ref 6–23)
CA-I BLDA-SCNC: 0.84 MMOL/L (ref 1.1–1.33)
CA-I BLDA-SCNC: 0.92 MMOL/L (ref 1.1–1.33)
CA-I BLDA-SCNC: 0.94 MMOL/L (ref 1.1–1.33)
CA-I BLDA-SCNC: 1.07 MMOL/L (ref 1.1–1.33)
CA-I BLDA-SCNC: 1.08 MMOL/L (ref 1.1–1.33)
CA-I BLDA-SCNC: 1.17 MMOL/L (ref 1.1–1.33)
CA-I BLDA-SCNC: 1.19 MMOL/L (ref 1.1–1.33)
CA-I BLDA-SCNC: 1.26 MMOL/L (ref 1.1–1.33)
CA-I BLDV-SCNC: 1.08 MMOL/L (ref 1.1–1.33)
CA-I BLDV-SCNC: 1.28 MMOL/L (ref 1.1–1.33)
CALCIUM SERPL-MCNC: 7.6 MG/DL (ref 8.6–10.3)
CHLORIDE BLDA-SCNC: 104 MMOL/L (ref 98–107)
CHLORIDE BLDA-SCNC: 105 MMOL/L (ref 98–107)
CHLORIDE BLDA-SCNC: 110 MMOL/L (ref 98–107)
CHLORIDE BLDA-SCNC: 110 MMOL/L (ref 98–107)
CHLORIDE BLDA-SCNC: 111 MMOL/L (ref 98–107)
CHLORIDE BLDA-SCNC: 113 MMOL/L (ref 98–107)
CHLORIDE BLDV-SCNC: 104 MMOL/L (ref 98–107)
CHLORIDE BLDV-SCNC: 105 MMOL/L (ref 98–107)
CHLORIDE SERPL-SCNC: 113 MMOL/L (ref 98–107)
CO2 SERPL-SCNC: 23 MMOL/L (ref 21–32)
COHGB MFR BLDA: 0.8 %
COHGB MFR BLDA: 0.9 %
COHGB MFR BLDA: 1 %
COHGB MFR BLDA: 1 %
COHGB MFR BLDA: 1.4 %
CREAT SERPL-MCNC: 0.92 MG/DL (ref 0.5–1.3)
DO-HGB MFR BLDA: 0.8 % (ref 0–5)
DO-HGB MFR BLDA: 0.9 % (ref 0–5)
DO-HGB MFR BLDA: 1.1 % (ref 0–5)
DO-HGB MFR BLDA: 1.2 % (ref 0–5)
DO-HGB MFR BLDA: 2.3 % (ref 0–5)
EGFRCR SERPLBLD CKD-EPI 2021: >90 ML/MIN/1.73M*2
ELECTRONICALLY SIGNED BY: NORMAL
ELECTRONICALLY SIGNED BY: NORMAL
ERYTHROCYTE [DISTWIDTH] IN BLOOD BY AUTOMATED COUNT: 14.1 % (ref 11.5–14.5)
FACTOR II-PROTHROMBIN INTERPRETATION: NORMAL
FACTOR II-PROTHROMBIN RESULT: NORMAL
FACTOR V LEIDEN INTERPRETATION: NORMAL
FACTOR V LEIDEN RESULT: NORMAL
FIBRINOGEN PPP-MCNC: 190 MG/DL (ref 200–400)
GLUCOSE BLD MANUAL STRIP-MCNC: 101 MG/DL (ref 74–99)
GLUCOSE BLD MANUAL STRIP-MCNC: 105 MG/DL (ref 74–99)
GLUCOSE BLD MANUAL STRIP-MCNC: 123 MG/DL (ref 74–99)
GLUCOSE BLD MANUAL STRIP-MCNC: 164 MG/DL (ref 74–99)
GLUCOSE BLDA-MCNC: 107 MG/DL (ref 74–99)
GLUCOSE BLDA-MCNC: 125 MG/DL (ref 74–99)
GLUCOSE BLDA-MCNC: 158 MG/DL (ref 74–99)
GLUCOSE BLDA-MCNC: 159 MG/DL (ref 74–99)
GLUCOSE BLDA-MCNC: 183 MG/DL (ref 74–99)
GLUCOSE BLDA-MCNC: 193 MG/DL (ref 74–99)
GLUCOSE BLDA-MCNC: 206 MG/DL (ref 74–99)
GLUCOSE BLDA-MCNC: ABNORMAL MG/DL
GLUCOSE BLDV-MCNC: 125 MG/DL (ref 74–99)
GLUCOSE BLDV-MCNC: 222 MG/DL (ref 74–99)
GLUCOSE SERPL-MCNC: 112 MG/DL (ref 74–99)
HCO3 BLDA-SCNC: 22 MMOL/L (ref 22–26)
HCO3 BLDA-SCNC: 22 MMOL/L (ref 22–26)
HCO3 BLDA-SCNC: 22.7 MMOL/L (ref 22–26)
HCO3 BLDA-SCNC: 23.1 MMOL/L (ref 22–26)
HCO3 BLDA-SCNC: 24.3 MMOL/L (ref 22–26)
HCO3 BLDA-SCNC: 25.5 MMOL/L (ref 22–26)
HCO3 BLDA-SCNC: 25.8 MMOL/L (ref 22–26)
HCO3 BLDA-SCNC: 27.8 MMOL/L (ref 22–26)
HCO3 BLDV-SCNC: 22.2 MMOL/L (ref 22–26)
HCO3 BLDV-SCNC: 26.4 MMOL/L (ref 22–26)
HCT VFR BLD AUTO: 35.7 % (ref 41–52)
HCT VFR BLD EST: 35 % (ref 41–52)
HCT VFR BLD EST: 35 % (ref 41–52)
HCT VFR BLD EST: 36 % (ref 41–52)
HCT VFR BLD EST: 38 % (ref 41–52)
HCT VFR BLD EST: 39 % (ref 41–52)
HCT VFR BLD EST: 39 % (ref 41–52)
HCT VFR BLD EST: 40 % (ref 41–52)
HCT VFR BLD EST: 40 % (ref 41–52)
HCT VFR BLD EST: 42 % (ref 41–52)
HCT VFR BLD EST: 44 % (ref 41–52)
HGB BLD-MCNC: 12.7 G/DL (ref 13.5–17.5)
HGB BLDA-MCNC: 11.6 G/DL (ref 13.5–17.5)
HGB BLDA-MCNC: 11.8 G/DL (ref 13.5–17.5)
HGB BLDA-MCNC: 11.9 G/DL (ref 13.5–17.5)
HGB BLDA-MCNC: 11.9 G/DL (ref 13.5–17.5)
HGB BLDA-MCNC: 12.7 G/DL (ref 13.5–17.5)
HGB BLDA-MCNC: 12.7 G/DL (ref 13.5–17.5)
HGB BLDA-MCNC: 13.1 G/DL (ref 13.5–17.5)
HGB BLDA-MCNC: 13.3 G/DL (ref 13.5–17.5)
HGB BLDA-MCNC: 13.3 G/DL (ref 13.5–17.5)
HGB BLDA-MCNC: 14 G/DL (ref 13.5–17.5)
HGB BLDA-MCNC: 14 G/DL (ref 13.5–17.5)
HGB BLDA-MCNC: 14.5 G/DL (ref 13.5–17.5)
HGB BLDA-MCNC: 14.5 G/DL (ref 13.5–17.5)
HGB BLDV-MCNC: 12.9 G/DL (ref 13.5–17.5)
HGB BLDV-MCNC: 13.4 G/DL (ref 13.5–17.5)
HOLD SPECIMEN: NORMAL
INHALED O2 CONCENTRATION: 100 %
INHALED O2 CONCENTRATION: 21 %
INHALED O2 CONCENTRATION: 50 %
INHALED O2 CONCENTRATION: 80 %
INHALED O2 CONCENTRATION: 90 %
INR PPP: 1.4 (ref 0.9–1.1)
LACTATE BLDA-SCNC: 0.8 MMOL/L (ref 0.4–2)
LACTATE BLDA-SCNC: 1.3 MMOL/L (ref 0.4–2)
LACTATE BLDA-SCNC: 1.7 MMOL/L (ref 0.4–2)
LACTATE BLDA-SCNC: 2.2 MMOL/L (ref 0.4–2)
LACTATE BLDA-SCNC: 2.4 MMOL/L (ref 0.4–2)
LACTATE BLDA-SCNC: 2.6 MMOL/L (ref 0.4–2)
LACTATE BLDA-SCNC: 2.6 MMOL/L (ref 0.4–2)
LACTATE BLDA-SCNC: 3.1 MMOL/L (ref 0.4–2)
LACTATE BLDV-SCNC: 0.8 MMOL/L (ref 0.4–2)
LACTATE BLDV-SCNC: 2.6 MMOL/L (ref 0.4–2)
MAGNESIUM SERPL-MCNC: 2.6 MG/DL (ref 1.6–2.4)
MCH RBC QN AUTO: 29 PG (ref 26–34)
MCHC RBC AUTO-ENTMCNC: 35.6 G/DL (ref 32–36)
MCV RBC AUTO: 82 FL (ref 80–100)
METHGB MFR BLDA: 0.2 % (ref 0–1.5)
METHGB MFR BLDA: 0.6 % (ref 0–1.5)
METHGB MFR BLDA: 0.7 % (ref 0–1.5)
METHGB MFR BLDA: 0.7 % (ref 0–1.5)
METHGB MFR BLDA: 0.8 % (ref 0–1.5)
NRBC BLD-RTO: 0 /100 WBCS (ref 0–0)
OXYHGB MFR BLDA: 94.2 % (ref 94–98)
OXYHGB MFR BLDA: 95.7 % (ref 94–98)
OXYHGB MFR BLDA: 95.7 % (ref 94–98)
OXYHGB MFR BLDA: 97.1 % (ref 94–98)
OXYHGB MFR BLDA: 97.1 % (ref 94–98)
OXYHGB MFR BLDA: 97.2 % (ref 94–98)
OXYHGB MFR BLDA: 97.5 % (ref 94–98)
OXYHGB MFR BLDA: 97.5 % (ref 94–98)
OXYHGB MFR BLDA: 97.6 % (ref 94–98)
OXYHGB MFR BLDA: 98.2 % (ref 94–98)
OXYHGB MFR BLDA: 98.2 % (ref 94–98)
OXYHGB MFR BLDV: 86.1 % (ref 45–75)
OXYHGB MFR BLDV: 88.8 % (ref 45–75)
PCO2 BLDA: 35 MM HG (ref 38–42)
PCO2 BLDA: 38 MM HG (ref 38–42)
PCO2 BLDA: 39 MM HG (ref 38–42)
PCO2 BLDA: 41 MM HG (ref 38–42)
PCO2 BLDA: 45 MM HG (ref 38–42)
PCO2 BLDA: 47 MM HG (ref 38–42)
PCO2 BLDA: 48 MM HG (ref 38–42)
PCO2 BLDA: 48 MM HG (ref 38–42)
PCO2 BLDV: 49 MM HG (ref 41–51)
PCO2 BLDV: 53 MM HG (ref 41–51)
PEEP CMH2O: 5 CM H2O
PH BLDA: 7.27 PH (ref 7.38–7.42)
PH BLDA: 7.29 PH (ref 7.38–7.42)
PH BLDA: 7.31 PH (ref 7.38–7.42)
PH BLDA: 7.36 PH (ref 7.38–7.42)
PH BLDA: 7.38 PH (ref 7.38–7.42)
PH BLDA: 7.38 PH (ref 7.38–7.42)
PH BLDA: 7.44 PH (ref 7.38–7.42)
PH BLDA: 7.47 PH (ref 7.38–7.42)
PH BLDV: 7.23 PH (ref 7.33–7.43)
PH BLDV: 7.34 PH (ref 7.33–7.43)
PHOSPHATE SERPL-MCNC: 1.8 MG/DL (ref 2.5–4.9)
PLATELET # BLD AUTO: 140 X10*3/UL (ref 150–450)
PLATELET # BLD AUTO: 140 X10*3/UL (ref 150–450)
PO2 BLDA: 239 MM HG (ref 85–95)
PO2 BLDA: 305 MM HG (ref 85–95)
PO2 BLDA: 341 MM HG (ref 85–95)
PO2 BLDA: 341 MM HG (ref 85–95)
PO2 BLDA: 347 MM HG (ref 85–95)
PO2 BLDA: 416 MM HG (ref 85–95)
PO2 BLDA: 71 MM HG (ref 85–95)
PO2 BLDA: 88 MM HG (ref 85–95)
PO2 BLDV: 62 MM HG (ref 35–45)
PO2 BLDV: 64 MM HG (ref 35–45)
POTASSIUM BLDA-SCNC: 3.6 MMOL/L (ref 3.5–5.3)
POTASSIUM BLDA-SCNC: 4.4 MMOL/L (ref 3.5–5.3)
POTASSIUM BLDA-SCNC: 4.5 MMOL/L (ref 3.5–5.3)
POTASSIUM BLDA-SCNC: 4.9 MMOL/L (ref 3.5–5.3)
POTASSIUM BLDA-SCNC: 5 MMOL/L (ref 3.5–5.3)
POTASSIUM BLDA-SCNC: 5.6 MMOL/L (ref 3.5–5.3)
POTASSIUM BLDA-SCNC: 6 MMOL/L (ref 3.5–5.3)
POTASSIUM BLDA-SCNC: 6.3 MMOL/L (ref 3.5–5.3)
POTASSIUM BLDV-SCNC: 4.5 MMOL/L (ref 3.5–5.3)
POTASSIUM BLDV-SCNC: 5.9 MMOL/L (ref 3.5–5.3)
POTASSIUM SERPL-SCNC: 3.6 MMOL/L (ref 3.5–5.3)
PROTHROMBIN TIME: 16.1 SECONDS (ref 9.8–12.8)
RBC # BLD AUTO: 4.38 X10*6/UL (ref 4.5–5.9)
RH FACTOR (ANTIGEN D): NORMAL
SAO2 % BLDA: 96 % (ref 94–100)
SAO2 % BLDA: 98 % (ref 94–100)
SAO2 % BLDA: 99 % (ref 94–100)
SAO2 % BLDV: 88 % (ref 45–75)
SAO2 % BLDV: 91 % (ref 45–75)
SODIUM BLDA-SCNC: 130 MMOL/L (ref 136–145)
SODIUM BLDA-SCNC: 130 MMOL/L (ref 136–145)
SODIUM BLDA-SCNC: 133 MMOL/L (ref 136–145)
SODIUM BLDA-SCNC: 134 MMOL/L (ref 136–145)
SODIUM BLDA-SCNC: 135 MMOL/L (ref 136–145)
SODIUM BLDA-SCNC: 138 MMOL/L (ref 136–145)
SODIUM BLDA-SCNC: 138 MMOL/L (ref 136–145)
SODIUM BLDA-SCNC: 141 MMOL/L (ref 136–145)
SODIUM BLDV-SCNC: 132 MMOL/L (ref 136–145)
SODIUM BLDV-SCNC: 133 MMOL/L (ref 136–145)
SODIUM SERPL-SCNC: 144 MMOL/L (ref 136–145)
SPECIMEN DRAWN FROM PATIENT: ABNORMAL
TIDAL VOLUME: 550 ML
VENTILATOR MODE: ABNORMAL
VENTILATOR RATE: 16 BPM
WBC # BLD AUTO: 17.9 X10*3/UL (ref 4.4–11.3)

## 2024-09-23 PROCEDURE — 84132 ASSAY OF SERUM POTASSIUM: CPT

## 2024-09-23 PROCEDURE — 85027 COMPLETE CBC AUTOMATED: CPT | Performed by: NURSE PRACTITIONER

## 2024-09-23 PROCEDURE — 71045 X-RAY EXAM CHEST 1 VIEW: CPT | Performed by: RADIOLOGY

## 2024-09-23 PROCEDURE — 02100Z9 BYPASS CORONARY ARTERY, ONE ARTERY FROM LEFT INTERNAL MAMMARY, OPEN APPROACH: ICD-10-PCS | Performed by: THORACIC SURGERY (CARDIOTHORACIC VASCULAR SURGERY)

## 2024-09-23 PROCEDURE — P9035 PLATELET PHERES LEUKOREDUCED: HCPCS

## 2024-09-23 PROCEDURE — 37799 UNLISTED PX VASCULAR SURGERY: CPT | Performed by: NURSE PRACTITIONER

## 2024-09-23 PROCEDURE — 3600000012 HC PERFUSION TIME - EACH INCREMENTAL 1 MINUTE: Performed by: THORACIC SURGERY (CARDIOTHORACIC VASCULAR SURGERY)

## 2024-09-23 PROCEDURE — S0017 INJECTION, AMINOCAPROIC ACID: HCPCS | Performed by: ANESTHESIOLOGY

## 2024-09-23 PROCEDURE — 33509 NDSC HRV UXTR ART 1 SGM CAB: CPT | Performed by: THORACIC SURGERY (CARDIOTHORACIC VASCULAR SURGERY)

## 2024-09-23 PROCEDURE — 2500000005 HC RX 250 GENERAL PHARMACY W/O HCPCS: Performed by: THORACIC SURGERY (CARDIOTHORACIC VASCULAR SURGERY)

## 2024-09-23 PROCEDURE — 2500000004 HC RX 250 GENERAL PHARMACY W/ HCPCS (ALT 636 FOR OP/ED): Performed by: STUDENT IN AN ORGANIZED HEALTH CARE EDUCATION/TRAINING PROGRAM

## 2024-09-23 PROCEDURE — 2500000005 HC RX 250 GENERAL PHARMACY W/O HCPCS: Performed by: NURSE ANESTHETIST, CERTIFIED REGISTERED

## 2024-09-23 PROCEDURE — 2020000001 HC ICU ROOM DAILY

## 2024-09-23 PROCEDURE — 3600000011 HC PERFUSION TIME - INITIAL BASE CHARGE: Performed by: THORACIC SURGERY (CARDIOTHORACIC VASCULAR SURGERY)

## 2024-09-23 PROCEDURE — 2500000004 HC RX 250 GENERAL PHARMACY W/ HCPCS (ALT 636 FOR OP/ED)

## 2024-09-23 PROCEDURE — 2500000005 HC RX 250 GENERAL PHARMACY W/O HCPCS

## 2024-09-23 PROCEDURE — 82375 ASSAY CARBOXYHB QUANT: CPT

## 2024-09-23 PROCEDURE — 85347 COAGULATION TIME ACTIVATED: CPT

## 2024-09-23 PROCEDURE — 94681 O2 UPTK CO2 OUTP % O2 XTRC: CPT

## 2024-09-23 PROCEDURE — 82947 ASSAY GLUCOSE BLOOD QUANT: CPT

## 2024-09-23 PROCEDURE — 2720000007 HC OR 272 NO HCPCS: Performed by: THORACIC SURGERY (CARDIOTHORACIC VASCULAR SURGERY)

## 2024-09-23 PROCEDURE — 3600000018 HC OR TIME - INITIAL BASE CHARGE - PROCEDURE LEVEL SIX: Performed by: THORACIC SURGERY (CARDIOTHORACIC VASCULAR SURGERY)

## 2024-09-23 PROCEDURE — 84132 ASSAY OF SERUM POTASSIUM: CPT | Performed by: NURSE PRACTITIONER

## 2024-09-23 PROCEDURE — 06BP4ZZ EXCISION OF RIGHT SAPHENOUS VEIN, PERCUTANEOUS ENDOSCOPIC APPROACH: ICD-10-PCS | Performed by: THORACIC SURGERY (CARDIOTHORACIC VASCULAR SURGERY)

## 2024-09-23 PROCEDURE — 94002 VENT MGMT INPAT INIT DAY: CPT

## 2024-09-23 PROCEDURE — 85610 PROTHROMBIN TIME: CPT | Performed by: NURSE PRACTITIONER

## 2024-09-23 PROCEDURE — S0017 INJECTION, AMINOCAPROIC ACID: HCPCS | Performed by: NURSE ANESTHETIST, CERTIFIED REGISTERED

## 2024-09-23 PROCEDURE — 2780000003 HC OR 278 NO HCPCS: Performed by: THORACIC SURGERY (CARDIOTHORACIC VASCULAR SURGERY)

## 2024-09-23 PROCEDURE — 84132 ASSAY OF SERUM POTASSIUM: CPT | Performed by: SURGERY

## 2024-09-23 PROCEDURE — 85384 FIBRINOGEN ACTIVITY: CPT | Performed by: NURSE PRACTITIONER

## 2024-09-23 PROCEDURE — 71045 X-RAY EXAM CHEST 1 VIEW: CPT

## 2024-09-23 PROCEDURE — 99291 CRITICAL CARE FIRST HOUR: CPT

## 2024-09-23 PROCEDURE — 93005 ELECTROCARDIOGRAM TRACING: CPT

## 2024-09-23 PROCEDURE — 33517 CABG ARTERY-VEIN SINGLE: CPT | Performed by: THORACIC SURGERY (CARDIOTHORACIC VASCULAR SURGERY)

## 2024-09-23 PROCEDURE — 2500000004 HC RX 250 GENERAL PHARMACY W/ HCPCS (ALT 636 FOR OP/ED): Performed by: NURSE PRACTITIONER

## 2024-09-23 PROCEDURE — 2500000005 HC RX 250 GENERAL PHARMACY W/O HCPCS: Performed by: NURSE PRACTITIONER

## 2024-09-23 PROCEDURE — A4649 SURGICAL SUPPLIES: HCPCS | Performed by: THORACIC SURGERY (CARDIOTHORACIC VASCULAR SURGERY)

## 2024-09-23 PROCEDURE — 3700000002 HC GENERAL ANESTHESIA TIME - EACH INCREMENTAL 1 MINUTE: Performed by: THORACIC SURGERY (CARDIOTHORACIC VASCULAR SURGERY)

## 2024-09-23 PROCEDURE — 33534 CABG ARTERIAL TWO: CPT | Performed by: THORACIC SURGERY (CARDIOTHORACIC VASCULAR SURGERY)

## 2024-09-23 PROCEDURE — 021009W BYPASS CORONARY ARTERY, ONE ARTERY FROM AORTA WITH AUTOLOGOUS VENOUS TISSUE, OPEN APPROACH: ICD-10-PCS | Performed by: THORACIC SURGERY (CARDIOTHORACIC VASCULAR SURGERY)

## 2024-09-23 PROCEDURE — 2500000004 HC RX 250 GENERAL PHARMACY W/ HCPCS (ALT 636 FOR OP/ED): Performed by: ANESTHESIOLOGY

## 2024-09-23 PROCEDURE — 02100AW BYPASS CORONARY ARTERY, ONE ARTERY FROM AORTA WITH AUTOLOGOUS ARTERIAL TISSUE, OPEN APPROACH: ICD-10-PCS | Performed by: THORACIC SURGERY (CARDIOTHORACIC VASCULAR SURGERY)

## 2024-09-23 PROCEDURE — 5A1221Z PERFORMANCE OF CARDIAC OUTPUT, CONTINUOUS: ICD-10-PCS | Performed by: THORACIC SURGERY (CARDIOTHORACIC VASCULAR SURGERY)

## 2024-09-23 PROCEDURE — 2500000004 HC RX 250 GENERAL PHARMACY W/ HCPCS (ALT 636 FOR OP/ED): Performed by: THORACIC SURGERY (CARDIOTHORACIC VASCULAR SURGERY)

## 2024-09-23 PROCEDURE — 2500000001 HC RX 250 WO HCPCS SELF ADMINISTERED DRUGS (ALT 637 FOR MEDICARE OP): Performed by: NURSE PRACTITIONER

## 2024-09-23 PROCEDURE — 2500000004 HC RX 250 GENERAL PHARMACY W/ HCPCS (ALT 636 FOR OP/ED): Mod: JZ

## 2024-09-23 PROCEDURE — 33508 ENDOSCOPIC VEIN HARVEST: CPT | Performed by: THORACIC SURGERY (CARDIOTHORACIC VASCULAR SURGERY)

## 2024-09-23 PROCEDURE — 05BA4ZZ EXCISION OF LEFT BRACHIAL VEIN, PERCUTANEOUS ENDOSCOPIC APPROACH: ICD-10-PCS | Performed by: THORACIC SURGERY (CARDIOTHORACIC VASCULAR SURGERY)

## 2024-09-23 PROCEDURE — P9045 ALBUMIN (HUMAN), 5%, 250 ML: HCPCS | Mod: JZ

## 2024-09-23 PROCEDURE — P9047 ALBUMIN (HUMAN), 25%, 50ML: HCPCS | Mod: JZ | Performed by: THORACIC SURGERY (CARDIOTHORACIC VASCULAR SURGERY)

## 2024-09-23 PROCEDURE — C1887 CATHETER, GUIDING: HCPCS | Performed by: THORACIC SURGERY (CARDIOTHORACIC VASCULAR SURGERY)

## 2024-09-23 PROCEDURE — 83735 ASSAY OF MAGNESIUM: CPT | Performed by: NURSE PRACTITIONER

## 2024-09-23 PROCEDURE — 2500000004 HC RX 250 GENERAL PHARMACY W/ HCPCS (ALT 636 FOR OP/ED): Mod: JZ | Performed by: NURSE ANESTHETIST, CERTIFIED REGISTERED

## 2024-09-23 PROCEDURE — 3700000001 HC GENERAL ANESTHESIA TIME - INITIAL BASE CHARGE: Performed by: THORACIC SURGERY (CARDIOTHORACIC VASCULAR SURGERY)

## 2024-09-23 PROCEDURE — 3600000017 HC OR TIME - EACH INCREMENTAL 1 MINUTE - PROCEDURE LEVEL SIX: Performed by: THORACIC SURGERY (CARDIOTHORACIC VASCULAR SURGERY)

## 2024-09-23 PROCEDURE — C1900 LEAD, CORONARY VENOUS: HCPCS | Performed by: THORACIC SURGERY (CARDIOTHORACIC VASCULAR SURGERY)

## 2024-09-23 PROCEDURE — 93010 ELECTROCARDIOGRAM REPORT: CPT | Performed by: INTERNAL MEDICINE

## 2024-09-23 DEVICE — ADAPTER 13003 CARDIOPLEGIA MGMT SET 17L
Type: IMPLANTABLE DEVICE | Site: HEART | Status: NON-FUNCTIONAL
Brand: DLP®

## 2024-09-23 DEVICE — PLATE KIT, BOX CABLE, XP RIGID FIXATION: Type: IMPLANTABLE DEVICE | Site: STERNUM | Status: FUNCTIONAL

## 2024-09-23 DEVICE — PLATE, X CABLE, XP RIGID FIXATION: Type: IMPLANTABLE DEVICE | Site: STERNUM | Status: FUNCTIONAL

## 2024-09-23 DEVICE — PLATE KIT, AUXILIARYCABLE, SHARP NEEDLE, XP RIGID FIXATION: Type: IMPLANTABLE DEVICE | Site: STERNUM | Status: FUNCTIONAL

## 2024-09-23 RX ORDER — METOCLOPRAMIDE HYDROCHLORIDE 5 MG/ML
10 INJECTION INTRAMUSCULAR; INTRAVENOUS EVERY 6 HOURS PRN
Status: DISCONTINUED | OUTPATIENT
Start: 2024-09-23 | End: 2024-09-23

## 2024-09-23 RX ORDER — AMINOCAPROIC ACID 250 MG/ML
INJECTION, SOLUTION INTRAVENOUS AS NEEDED
Status: DISCONTINUED | OUTPATIENT
Start: 2024-09-23 | End: 2024-09-23

## 2024-09-23 RX ORDER — HEPARIN SODIUM 1000 [USP'U]/ML
42400 INJECTION, SOLUTION INTRAVENOUS; SUBCUTANEOUS ONCE
Status: DISCONTINUED | OUTPATIENT
Start: 2024-09-23 | End: 2024-09-23

## 2024-09-23 RX ORDER — PROTAMINE SULFATE 10 MG/ML
INJECTION, SOLUTION INTRAVENOUS AS NEEDED
Status: DISCONTINUED | OUTPATIENT
Start: 2024-09-23 | End: 2024-09-23

## 2024-09-23 RX ORDER — CEFAZOLIN SODIUM 2 G/100ML
2 INJECTION, SOLUTION INTRAVENOUS
Status: COMPLETED | OUTPATIENT
Start: 2024-09-23 | End: 2024-09-23

## 2024-09-23 RX ORDER — NITROGLYCERIN 40 MG/100ML
INJECTION INTRAVENOUS AS NEEDED
Status: DISCONTINUED | OUTPATIENT
Start: 2024-09-23 | End: 2024-09-23

## 2024-09-23 RX ORDER — ONDANSETRON HYDROCHLORIDE 2 MG/ML
4 INJECTION, SOLUTION INTRAVENOUS EVERY 8 HOURS PRN
Status: DISCONTINUED | OUTPATIENT
Start: 2024-09-23 | End: 2024-09-28 | Stop reason: HOSPADM

## 2024-09-23 RX ORDER — CISATRACURIUM BESYLATE 2 MG/ML
INJECTION, SOLUTION INTRAVENOUS AS NEEDED
Status: DISCONTINUED | OUTPATIENT
Start: 2024-09-23 | End: 2024-09-23

## 2024-09-23 RX ORDER — DOCUSATE SODIUM 100 MG/1
100 CAPSULE, LIQUID FILLED ORAL 3 TIMES DAILY
Status: DISCONTINUED | OUTPATIENT
Start: 2024-09-23 | End: 2024-09-28 | Stop reason: HOSPADM

## 2024-09-23 RX ORDER — PROPOFOL 10 MG/ML
0-50 INJECTION, EMULSION INTRAVENOUS CONTINUOUS
Status: DISCONTINUED | OUTPATIENT
Start: 2024-09-23 | End: 2024-09-23

## 2024-09-23 RX ORDER — OXYCODONE HYDROCHLORIDE 5 MG/1
10 TABLET ORAL EVERY 4 HOURS PRN
Status: DISCONTINUED | OUTPATIENT
Start: 2024-09-23 | End: 2024-09-25

## 2024-09-23 RX ORDER — CALCIUM GLUCONATE 20 MG/ML
2 INJECTION, SOLUTION INTRAVENOUS ONCE
Status: COMPLETED | OUTPATIENT
Start: 2024-09-23 | End: 2024-09-23

## 2024-09-23 RX ORDER — MAGNESIUM SULFATE HEPTAHYDRATE 500 MG/ML
INJECTION, SOLUTION INTRAMUSCULAR; INTRAVENOUS AS NEEDED
Status: DISCONTINUED | OUTPATIENT
Start: 2024-09-23 | End: 2024-09-23

## 2024-09-23 RX ORDER — SODIUM CHLORIDE, SODIUM LACTATE, POTASSIUM CHLORIDE, CALCIUM CHLORIDE 600; 310; 30; 20 MG/100ML; MG/100ML; MG/100ML; MG/100ML
50 INJECTION, SOLUTION INTRAVENOUS CONTINUOUS
Status: DISCONTINUED | OUTPATIENT
Start: 2024-09-23 | End: 2024-09-24

## 2024-09-23 RX ORDER — PHENYLEPHRINE HYDROCHLORIDE 10 MG/ML
INJECTION INTRAVENOUS AS NEEDED
Status: DISCONTINUED | OUTPATIENT
Start: 2024-09-23 | End: 2024-09-23

## 2024-09-23 RX ORDER — ALBUMIN HUMAN 50 G/1000ML
25 SOLUTION INTRAVENOUS ONCE
Status: DISCONTINUED | OUTPATIENT
Start: 2024-09-23 | End: 2024-09-28 | Stop reason: HOSPADM

## 2024-09-23 RX ORDER — CEFAZOLIN SODIUM 2 G/100ML
2 INJECTION, SOLUTION INTRAVENOUS
Status: DISCONTINUED | OUTPATIENT
Start: 2024-09-23 | End: 2024-09-23

## 2024-09-23 RX ORDER — ALBUMIN HUMAN 50 G/1000ML
SOLUTION INTRAVENOUS
Status: COMPLETED
Start: 2024-09-23 | End: 2024-09-23

## 2024-09-23 RX ORDER — HEPARIN SODIUM 1000 [USP'U]/ML
42400 INJECTION, SOLUTION INTRAVENOUS; SUBCUTANEOUS ONCE
Status: COMPLETED | OUTPATIENT
Start: 2024-09-23 | End: 2024-09-23

## 2024-09-23 RX ORDER — PAPAVERINE HYDROCHLORIDE 30 MG/ML
INJECTION INTRAMUSCULAR; INTRAVENOUS AS NEEDED
Status: DISCONTINUED | OUTPATIENT
Start: 2024-09-23 | End: 2024-09-23 | Stop reason: HOSPADM

## 2024-09-23 RX ORDER — ASPIRIN 300 MG/1
150 SUPPOSITORY RECTAL ONCE
Status: DISCONTINUED | OUTPATIENT
Start: 2024-09-23 | End: 2024-09-28 | Stop reason: HOSPADM

## 2024-09-23 RX ORDER — POTASSIUM CHLORIDE 29.8 MG/ML
INJECTION INTRAVENOUS AS NEEDED
Status: DISCONTINUED | OUTPATIENT
Start: 2024-09-23 | End: 2024-09-23

## 2024-09-23 RX ORDER — DEXTROSE 50 % IN WATER (D50W) INTRAVENOUS SYRINGE
25
Status: DISCONTINUED | OUTPATIENT
Start: 2024-09-23 | End: 2024-09-28 | Stop reason: HOSPADM

## 2024-09-23 RX ORDER — METOCLOPRAMIDE 10 MG/1
10 TABLET ORAL EVERY 6 HOURS PRN
Status: DISCONTINUED | OUTPATIENT
Start: 2024-09-23 | End: 2024-09-25

## 2024-09-23 RX ORDER — SODIUM CHLORIDE 9 MG/ML
INJECTION, SOLUTION INTRAVENOUS CONTINUOUS PRN
Status: DISCONTINUED | OUTPATIENT
Start: 2024-09-23 | End: 2024-09-23

## 2024-09-23 RX ORDER — VANCOMYCIN HYDROCHLORIDE 1 G/20ML
INJECTION, POWDER, LYOPHILIZED, FOR SOLUTION INTRAVENOUS AS NEEDED
Status: DISCONTINUED | OUTPATIENT
Start: 2024-09-23 | End: 2024-09-23 | Stop reason: HOSPADM

## 2024-09-23 RX ORDER — FENTANYL CITRATE 50 UG/ML
INJECTION, SOLUTION INTRAMUSCULAR; INTRAVENOUS AS NEEDED
Status: DISCONTINUED | OUTPATIENT
Start: 2024-09-23 | End: 2024-09-23

## 2024-09-23 RX ORDER — INDOMETHACIN 25 MG/1
CAPSULE ORAL AS NEEDED
Status: DISCONTINUED | OUTPATIENT
Start: 2024-09-23 | End: 2024-09-23

## 2024-09-23 RX ORDER — FUROSEMIDE 10 MG/ML
INJECTION INTRAMUSCULAR; INTRAVENOUS AS NEEDED
Status: DISCONTINUED | OUTPATIENT
Start: 2024-09-23 | End: 2024-09-23

## 2024-09-23 RX ORDER — SODIUM CHLORIDE, SODIUM LACTATE, POTASSIUM CHLORIDE, CALCIUM CHLORIDE 600; 310; 30; 20 MG/100ML; MG/100ML; MG/100ML; MG/100ML
INJECTION, SOLUTION INTRAVENOUS CONTINUOUS PRN
Status: DISCONTINUED | OUTPATIENT
Start: 2024-09-23 | End: 2024-09-23

## 2024-09-23 RX ORDER — POLYETHYLENE GLYCOL 3350 17 G/17G
17 POWDER, FOR SOLUTION ORAL 2 TIMES DAILY
Status: DISCONTINUED | OUTPATIENT
Start: 2024-09-23 | End: 2024-09-28 | Stop reason: HOSPADM

## 2024-09-23 RX ORDER — ROSUVASTATIN CALCIUM 20 MG/1
40 TABLET, COATED ORAL DAILY
Status: DISCONTINUED | OUTPATIENT
Start: 2024-09-23 | End: 2024-09-28 | Stop reason: HOSPADM

## 2024-09-23 RX ORDER — NOREPINEPHRINE BITARTRATE 0.03 MG/ML
INJECTION, SOLUTION INTRAVENOUS CONTINUOUS PRN
Status: DISCONTINUED | OUTPATIENT
Start: 2024-09-23 | End: 2024-09-23

## 2024-09-23 RX ORDER — LIDOCAINE 560 MG/1
1 PATCH PERCUTANEOUS; TOPICAL; TRANSDERMAL EVERY 24 HOURS
Status: DISPENSED | OUTPATIENT
Start: 2024-09-23 | End: 2024-09-26

## 2024-09-23 RX ORDER — ASPIRIN 81 MG/1
81 TABLET ORAL DAILY
Status: DISCONTINUED | OUTPATIENT
Start: 2024-09-23 | End: 2024-09-28 | Stop reason: HOSPADM

## 2024-09-23 RX ORDER — DEXMEDETOMIDINE HYDROCHLORIDE 4 UG/ML
.1-1.5 INJECTION, SOLUTION INTRAVENOUS CONTINUOUS
Status: DISCONTINUED | OUTPATIENT
Start: 2024-09-23 | End: 2024-09-23

## 2024-09-23 RX ORDER — ESMOLOL HYDROCHLORIDE 10 MG/ML
INJECTION INTRAVENOUS AS NEEDED
Status: DISCONTINUED | OUTPATIENT
Start: 2024-09-23 | End: 2024-09-23

## 2024-09-23 RX ORDER — PANTOPRAZOLE SODIUM 40 MG/10ML
40 INJECTION, POWDER, LYOPHILIZED, FOR SOLUTION INTRAVENOUS
Status: DISCONTINUED | OUTPATIENT
Start: 2024-09-24 | End: 2024-09-23

## 2024-09-23 RX ORDER — ALBUTEROL SULFATE 90 UG/1
2 INHALANT RESPIRATORY (INHALATION) EVERY 6 HOURS PRN
Status: DISCONTINUED | OUTPATIENT
Start: 2024-09-23 | End: 2024-09-28 | Stop reason: HOSPADM

## 2024-09-23 RX ORDER — ETOMIDATE 2 MG/ML
INJECTION INTRAVENOUS AS NEEDED
Status: DISCONTINUED | OUTPATIENT
Start: 2024-09-23 | End: 2024-09-23

## 2024-09-23 RX ORDER — CEFAZOLIN SODIUM 2 G/100ML
2 INJECTION, SOLUTION INTRAVENOUS ONCE
Status: DISCONTINUED | OUTPATIENT
Start: 2024-09-23 | End: 2024-09-23

## 2024-09-23 RX ORDER — NALOXONE HYDROCHLORIDE 1 MG/ML
0.2 INJECTION INTRAMUSCULAR; INTRAVENOUS; SUBCUTANEOUS EVERY 5 MIN PRN
Status: DISCONTINUED | OUTPATIENT
Start: 2024-09-23 | End: 2024-09-28 | Stop reason: HOSPADM

## 2024-09-23 RX ORDER — CEFAZOLIN SODIUM 2 G/100ML
2 INJECTION, SOLUTION INTRAVENOUS EVERY 8 HOURS
Status: COMPLETED | OUTPATIENT
Start: 2024-09-23 | End: 2024-09-25

## 2024-09-23 RX ORDER — BISACODYL 5 MG
10 TABLET, DELAYED RELEASE (ENTERIC COATED) ORAL DAILY PRN
Status: DISCONTINUED | OUTPATIENT
Start: 2024-09-23 | End: 2024-09-28 | Stop reason: HOSPADM

## 2024-09-23 RX ORDER — CALCIUM CHLORIDE INJECTION 100 MG/ML
INJECTION, SOLUTION INTRAVENOUS AS NEEDED
Status: DISCONTINUED | OUTPATIENT
Start: 2024-09-23 | End: 2024-09-23

## 2024-09-23 RX ORDER — NOREPINEPHRINE BITARTRATE/D5W 8 MG/250ML
0-1 PLASTIC BAG, INJECTION (ML) INTRAVENOUS CONTINUOUS
Status: DISCONTINUED | OUTPATIENT
Start: 2024-09-23 | End: 2024-09-24

## 2024-09-23 RX ORDER — INSULIN LISPRO 100 [IU]/ML
0-15 INJECTION, SOLUTION INTRAVENOUS; SUBCUTANEOUS EVERY 4 HOURS
Status: DISCONTINUED | OUTPATIENT
Start: 2024-09-23 | End: 2024-09-28 | Stop reason: HOSPADM

## 2024-09-23 RX ORDER — MIDAZOLAM HYDROCHLORIDE 1 MG/ML
INJECTION, SOLUTION INTRAMUSCULAR; INTRAVENOUS AS NEEDED
Status: DISCONTINUED | OUTPATIENT
Start: 2024-09-23 | End: 2024-09-23

## 2024-09-23 RX ORDER — PANTOPRAZOLE SODIUM 40 MG/1
40 TABLET, DELAYED RELEASE ORAL
Status: DISCONTINUED | OUTPATIENT
Start: 2024-09-24 | End: 2024-09-23

## 2024-09-23 RX ORDER — MUPIROCIN 20 MG/G
1 OINTMENT TOPICAL 2 TIMES DAILY
Status: DISCONTINUED | OUTPATIENT
Start: 2024-09-23 | End: 2024-09-28 | Stop reason: HOSPADM

## 2024-09-23 RX ORDER — SYRING-NEEDL,DISP,INSUL,0.3 ML 29 G X1/2"
297 SYRINGE, EMPTY DISPOSABLE MISCELLANEOUS ONCE AS NEEDED
Status: COMPLETED | OUTPATIENT
Start: 2024-09-23 | End: 2024-09-25

## 2024-09-23 RX ORDER — METOCLOPRAMIDE HYDROCHLORIDE 5 MG/ML
10 INJECTION INTRAMUSCULAR; INTRAVENOUS EVERY 6 HOURS PRN
Status: DISCONTINUED | OUTPATIENT
Start: 2024-09-23 | End: 2024-09-25

## 2024-09-23 RX ORDER — MAGNESIUM SULFATE HEPTAHYDRATE 40 MG/ML
2 INJECTION, SOLUTION INTRAVENOUS EVERY 6 HOURS PRN
Status: DISCONTINUED | OUTPATIENT
Start: 2024-09-23 | End: 2024-09-28 | Stop reason: HOSPADM

## 2024-09-23 RX ORDER — MAGNESIUM SULFATE HEPTAHYDRATE 40 MG/ML
4 INJECTION, SOLUTION INTRAVENOUS EVERY 6 HOURS PRN
Status: DISCONTINUED | OUTPATIENT
Start: 2024-09-23 | End: 2024-09-28 | Stop reason: HOSPADM

## 2024-09-23 RX ORDER — OXYCODONE HYDROCHLORIDE 5 MG/1
5 TABLET ORAL EVERY 4 HOURS PRN
Status: DISCONTINUED | OUTPATIENT
Start: 2024-09-23 | End: 2024-09-25

## 2024-09-23 RX ORDER — METOCLOPRAMIDE 10 MG/1
10 TABLET ORAL EVERY 6 HOURS PRN
Status: DISCONTINUED | OUTPATIENT
Start: 2024-09-23 | End: 2024-09-23

## 2024-09-23 RX ORDER — NICARDIPINE HYDROCHLORIDE 0.2 MG/ML
2.5-15 INJECTION INTRAVENOUS CONTINUOUS
Status: DISCONTINUED | OUTPATIENT
Start: 2024-09-23 | End: 2024-09-23

## 2024-09-23 RX ORDER — ACETAMINOPHEN 325 MG/1
650 TABLET ORAL EVERY 6 HOURS
Status: DISCONTINUED | OUTPATIENT
Start: 2024-09-23 | End: 2024-09-24

## 2024-09-23 RX ORDER — HYDRALAZINE HYDROCHLORIDE 20 MG/ML
INJECTION INTRAMUSCULAR; INTRAVENOUS AS NEEDED
Status: DISCONTINUED | OUTPATIENT
Start: 2024-09-23 | End: 2024-09-23

## 2024-09-23 RX ORDER — NITROGLYCERIN 20 MG/100ML
INJECTION INTRAVENOUS CONTINUOUS PRN
Status: DISCONTINUED | OUTPATIENT
Start: 2024-09-23 | End: 2024-09-23

## 2024-09-23 RX ORDER — LIDOCAINE HYDROCHLORIDE 10 MG/ML
INJECTION, SOLUTION INFILTRATION; PERINEURAL AS NEEDED
Status: DISCONTINUED | OUTPATIENT
Start: 2024-09-23 | End: 2024-09-23

## 2024-09-23 RX ORDER — PANTOPRAZOLE SODIUM 40 MG/1
40 TABLET, DELAYED RELEASE ORAL
Status: DISCONTINUED | OUTPATIENT
Start: 2024-09-24 | End: 2024-09-24

## 2024-09-23 RX ORDER — PANTOPRAZOLE SODIUM 40 MG/10ML
40 INJECTION, POWDER, LYOPHILIZED, FOR SOLUTION INTRAVENOUS
Status: DISCONTINUED | OUTPATIENT
Start: 2024-09-24 | End: 2024-09-24

## 2024-09-23 RX ORDER — POTASSIUM CHLORIDE 14.9 MG/ML
20 INJECTION INTRAVENOUS EVERY 6 HOURS PRN
Status: DISCONTINUED | OUTPATIENT
Start: 2024-09-23 | End: 2024-09-28 | Stop reason: HOSPADM

## 2024-09-23 RX ORDER — PROPOFOL 10 MG/ML
INJECTION, EMULSION INTRAVENOUS AS NEEDED
Status: DISCONTINUED | OUTPATIENT
Start: 2024-09-23 | End: 2024-09-23

## 2024-09-23 RX ORDER — PROTAMINE SULFATE 10 MG/ML
555 INJECTION, SOLUTION INTRAVENOUS ONCE
Status: DISCONTINUED | OUTPATIENT
Start: 2024-09-23 | End: 2024-09-23

## 2024-09-23 SDOH — SOCIAL STABILITY: SOCIAL INSECURITY: ARE THERE ANY APPARENT SIGNS OF INJURIES/BEHAVIORS THAT COULD BE RELATED TO ABUSE/NEGLECT?: NO

## 2024-09-23 SDOH — SOCIAL STABILITY: SOCIAL INSECURITY: ABUSE: ADULT

## 2024-09-23 SDOH — SOCIAL STABILITY: SOCIAL INSECURITY: HAS ANYONE EVER THREATENED TO HURT YOUR FAMILY OR YOUR PETS?: NO

## 2024-09-23 SDOH — SOCIAL STABILITY: SOCIAL INSECURITY: WERE YOU ABLE TO COMPLETE ALL THE BEHAVIORAL HEALTH SCREENINGS?: YES

## 2024-09-23 SDOH — HEALTH STABILITY: MENTAL HEALTH: CURRENT SMOKER: 0

## 2024-09-23 SDOH — SOCIAL STABILITY: SOCIAL INSECURITY: DOES ANYONE TRY TO KEEP YOU FROM HAVING/CONTACTING OTHER FRIENDS OR DOING THINGS OUTSIDE YOUR HOME?: NO

## 2024-09-23 SDOH — SOCIAL STABILITY: SOCIAL INSECURITY: ARE YOU OR HAVE YOU BEEN THREATENED OR ABUSED PHYSICALLY, EMOTIONALLY, OR SEXUALLY BY ANYONE?: NO

## 2024-09-23 SDOH — SOCIAL STABILITY: SOCIAL INSECURITY: DO YOU FEEL ANYONE HAS EXPLOITED OR TAKEN ADVANTAGE OF YOU FINANCIALLY OR OF YOUR PERSONAL PROPERTY?: NO

## 2024-09-23 SDOH — SOCIAL STABILITY: SOCIAL INSECURITY: HAVE YOU HAD THOUGHTS OF HARMING ANYONE ELSE?: NO

## 2024-09-23 SDOH — SOCIAL STABILITY: SOCIAL INSECURITY: DO YOU FEEL UNSAFE GOING BACK TO THE PLACE WHERE YOU ARE LIVING?: NO

## 2024-09-23 SDOH — SOCIAL STABILITY: SOCIAL INSECURITY: HAVE YOU HAD ANY THOUGHTS OF HARMING ANYONE ELSE?: NO

## 2024-09-23 ASSESSMENT — ACTIVITIES OF DAILY LIVING (ADL)
HEARING - RIGHT EAR: UNABLE TO ASSESS
WALKS IN HOME: INDEPENDENT
PATIENT'S MEMORY ADEQUATE TO SAFELY COMPLETE DAILY ACTIVITIES?: YES
FEEDING YOURSELF: INDEPENDENT
LACK_OF_TRANSPORTATION: NO
TOILETING: INDEPENDENT
HEARING - LEFT EAR: UNABLE TO ASSESS
GROOMING: INDEPENDENT
DRESSING YOURSELF: INDEPENDENT
ADEQUATE_TO_COMPLETE_ADL: YES
JUDGMENT_ADEQUATE_SAFELY_COMPLETE_DAILY_ACTIVITIES: YES
BATHING: INDEPENDENT

## 2024-09-23 ASSESSMENT — COGNITIVE AND FUNCTIONAL STATUS - GENERAL
DAILY ACTIVITIY SCORE: 24
MOBILITY SCORE: 24
PATIENT BASELINE BEDBOUND: NO

## 2024-09-23 ASSESSMENT — PAIN SCALES - GENERAL
PAINLEVEL_OUTOF10: 8
PAINLEVEL_OUTOF10: 0 - NO PAIN
PAINLEVEL_OUTOF10: 8
PAINLEVEL_OUTOF10: 6
PAINLEVEL_OUTOF10: 8
PAINLEVEL_OUTOF10: 6
PAINLEVEL_OUTOF10: 0 - NO PAIN
PAINLEVEL_OUTOF10: 6
PAIN_LEVEL: 0
PAINLEVEL_OUTOF10: 8

## 2024-09-23 ASSESSMENT — PAIN - FUNCTIONAL ASSESSMENT
PAIN_FUNCTIONAL_ASSESSMENT: 0-10

## 2024-09-23 ASSESSMENT — PAIN DESCRIPTION - LOCATION
LOCATION: CHEST

## 2024-09-23 ASSESSMENT — LIFESTYLE VARIABLES
HOW OFTEN DO YOU HAVE 6 OR MORE DRINKS ON ONE OCCASION: PATIENT UNABLE TO ANSWER
AUDIT-C TOTAL SCORE: -1
HOW MANY STANDARD DRINKS CONTAINING ALCOHOL DO YOU HAVE ON A TYPICAL DAY: PATIENT UNABLE TO ANSWER
SKIP TO QUESTIONS 9-10: 0
AUDIT-C TOTAL SCORE: -1
HOW OFTEN DO YOU HAVE A DRINK CONTAINING ALCOHOL: PATIENT UNABLE TO ANSWER

## 2024-09-23 ASSESSMENT — PATIENT HEALTH QUESTIONNAIRE - PHQ9
2. FEELING DOWN, DEPRESSED OR HOPELESS: NOT AT ALL
SUM OF ALL RESPONSES TO PHQ9 QUESTIONS 1 & 2: 0
1. LITTLE INTEREST OR PLEASURE IN DOING THINGS: NOT AT ALL

## 2024-09-23 ASSESSMENT — PAIN SCALES - WONG BAKER
WONGBAKER_NUMERICALRESPONSE: HURTS WHOLE LOT
WONGBAKER_NUMERICALRESPONSE: HURTS WORST
WONGBAKER_NUMERICALRESPONSE: HURTS WORST

## 2024-09-23 NOTE — ADDENDUM NOTE
Addended by: THADDEUS MARTE on: 9/23/2024 07:23 AM     Modules accepted: Orders     General Sunscreen Counseling: I recommended a broad spectrum sunscreen with a SPF of 30 or higher. I explained that SPF 30 sunscreens block approximately 97 percent of the sun's harmful rays. Sunscreens should be applied at least 15 minutes prior to expected sun exposure and then every 2 hours after that as long as sun exposure continues. If swimming or exercising sunscreen should be reapplied every 45 minutes to an hour after getting wet or sweating. One ounce, or the equivalent of a shot glass full of sunscreen, is adequate to protect the skin not covered by a bathing suit. I also recommended a lip balm with a sunscreen as well. Sun protective clothing can be used in lieu of sunscreen but must be worn the entire time you are exposed to the sun's rays. Detail Level: Detailed

## 2024-09-23 NOTE — BRIEF OP NOTE
"Date: 2024  OR Location: [facility] OR    Name: Yuri Reynolds, : 1960, Age: 64 y.o., MRN: [id number], Sex: male    Diagnosis  Pre-op Diagnosis      * Coronary artery disease of native artery of native heart with stable angina pectoris [I25.118] Post-op Diagnosis     * Coronary artery disease of native artery of native heart with stable angina pectoris [I25.118]     Procedures  Creation Bypass Graft Coronary Artery x3 (LIMA-LAD, RA-OM, SVG-PDA), EVH LEFT ARM AND RIGHT LEG, INTROP HANG,STERNALOCK CHEST PLATING  08611 - GA CORONARY ARTERY BYP W/VEIN & ARTERY GRAFT 3 VEIN    Median sternotomy  2. Left forearm endoscopic radial artery harvest  3. Right thigh/lower leg endoscopic saphenous vein harvest  4. Central cannulation for cardiopulmonary bypass  5. CABG x 3: SVG to PDA, radial artery to OM, proximally piggy-backed from proximal vein, and LIMA to LAD  6. Sternal approximation with 5 regular sternal wires and 3 Sternalock wires with plates    Chest Tubes/Drains: 1 left pleural chest tube/ 1 mediastinal chest tube       Temporary Pacing Wires: Ventricular    -Settings:NA   -Underlying Rhythm:NSR    Permanent pacer/ICD: No   -Preoperative settings:    -Intra-op/ Postoperative settings:    Sternotomy performed by: Dr. Malik Md    Conduit Harvested by: left arm radial: Moose NEFF   right SV: Richard SA-C     Sternal Wires placed by: Moose NEFF     Arm/Leg/Groin Closure/Cutdown performed by: Arm: Moose NEFF   Leg: Richard SA-C     Cardio Pulmonary Bypass Time: 112 minutes  Cross-clamp Time: 91 minutes  Circulatory Arrest: No Time:     Is patient candidate for Emergency Re-sternotomy? No   -If yes, POD #10 is -    Surgeons      * Freeman Millard - Primary    Resident/Fellow/Other Assistant:  Surgeons and Role:  * No surgeons found with a matching role *  Dr. Mauro\"Altagraciay  Moose CSA Richard SA-C   Procedure Summary  Anesthesia: General  ASA: IV  Anesthesia Staff: Anesthesiologist: Rodolfo Cast MD; " Lazara Medina MD  CRNA: NAVARRO Lovett-MIGUEL  Perfusionist: Jessica Gandara  Estimated Blood Loss: 300mL  Intra-op Medications:   Administrations occurring from 0630 to 1335 on 09/23/24:   Medication Name Total Dose   aminocaproic acid (Amicar) infusion 13.15 g   ceFAZolin (Ancef) 2 g in dextrose (iso)  mL 4 g   heparin 1,000 unit/mL injection 42,400 Units 42,400 Units              Anesthesia Record               Intraprocedure I/O Totals          Intake    Norepinephrine Drip 0.00 mL    The total shown is the total volume documented since Anesthesia Start was filed.    Aminocaproic Acid Drip 0.00 mL    The total shown is the total volume documented since Anesthesia Start was filed.    Nitroglycerin Drip 0.00 mL    The total shown is the total volume documented since Anesthesia Start was filed.    LR infusion 1500.00 mL    NaCl 0.9 % infusion 1000.00 mL    perfusion prime builder 785.00 mL    Cell Saver 470 mL    Total Intake 3755 mL       Output    Urine 1200 mL    Total Output 1200 mL       Net    Net Volume 2555 mL          Specimen:   ID Type Source Tests Collected by Time   A :  Blood Blood, Venous PLATELET COUNT Freeman Millard MD 9/23/2024 4872        Staff:   Circulator: Daniel Owens Person: Alexandrea Gill Circulator: Barrington  Circulator: Kristy          Findings: Severe CAD    Complications:  None; patient tolerated the procedure well.     Disposition: ICU - intubated and hemodynamically stable.  Condition: stable  Specimens Collected:   ID Type Source Tests Collected by Time   A :  Blood Blood, Venous PLATELET COUNT Freeman Millard MD 9/23/2024 1401     Attending Attestation: I was present for the entire procedure.    Freeman Millard  Phone Number: [phone]

## 2024-09-23 NOTE — ANESTHESIA PROCEDURE NOTES
PA Catheter Placement:    Date/Time: 9/23/2024 8:13 PM    A PA catheter was placed in the pre-op for the following indication(s): central venous access and CVP monitoring.    Staffing  Performed: attending   Authorized by: Rodolfo Cast MD    Performed by: Lazara Medina MD    Completed: patient identified, IV checked, site marked, risks and benefits discussed, surgical consent, monitors and equipment checked, pre-op evaluation and timeout performed    Procedure Information    Sterility preparation included the following: provider hand hygiene performed prior to central venous catheter insertion, all 5 sterile barriers used (gloves, gown, cap, mask, large sterile drape) during central venous catheter insertion and skin prep agent completely dried prior to procedure.      The site was prepped with ChloraPrep.  Skin prep agent completely dried prior to procedure.    The patient was placed in Trendelenburg position.     PA catheter laterality: Right  Site: internal jugular vein  PA catheter placement: distal introducer port  Catheter size: 7.5  Catheter type: oximetric  Successful placement with 1 attempt(s)      The PAC placement was confirmed by pressure tracing changes and x-ray.    Post insertion care included: dressing applied.     Procedure was uneventful.

## 2024-09-23 NOTE — PROGRESS NOTES
[facility]   Progress Note    Date:  9/23/2024  Patient:  Yuri Reynolds  YOB: 1960  MRN:  [id number]   Admit Date:  9/23/2024  ========================================================================================================    No chief complaint on file.    History of Present Illness:  Yuri Reynolds is a 64 y.o. year old male patient with Past Medical History of HTN, HLD, CAD with remote hx of MI (1999, 2003) s/p PCI, former nicotine dependence (quit 2017), former alcohol abuse associated with alcoholic cirrhosis (quit drinking in 2019), chronic thrombocytopenia 2/2 cirrhosis, and recently diagnosed PE on Eliquis. He has been having symptoms of exertional chest pain associated with dyspnea since April which have continued to progress where activity is now limited. He was admitted to [facility] in July with chest pain and SOB; CTA at that time suspicious for PE and patient started on Eliquis. Echo at that time revealed normal LV function with EF 55-60%, mild concentric LVH, no RWMA's, and trivial to 1+ tricuspid regurgitation with RVSP 23mmHg. He was evaluated by Cardiology this admission who felt ischemic eval could be done electively. Pt discharged to home on Eliquis on 7/22. He underwent Lexiscan perfusion study 8/2 revealing a moderate inferoapical myocardial infarction and angiogram advised. He presented to [facility] electively today and underwent coronary angiogram under the care of Dr. Moser. This revealed severe triple vessel disease including  of Cx and surgical revascularization advised. CTS consulted for further recommendations.      On 9/03, patient was seen in office by Dr. Gan and scheduled for CABG on 9/23. Patient underwent CABG x 3 (LIMA-LAD, RA-OM, SVG-PDA) with Dr. Gan and arrived in the ICU at 1537.      Intra-Op Details:     Pump Time: 112 mins      Cross Clamp Time: 91     EBL: 500 cc     Crystalloids: 2.5 L      Colloids: plasma post op       Blood: none     Cell Saver: 470     UOP: 1200     Chest Tubes: 2 - Left pleural and mediastinal      Pacing Wires: ventricular      Intra-op Complications: pt was hypertensive at the beginning of the case & began oozing from catheter sites. This resolved once blood pressure was lowered.      Interval ICU Events:  POD 0: Patient admitted to ICU s/p CABG x 3 (LIMA-LAD, RA-OM, SVG-PDA). Intubated and mechanically ventilated. Vent settings include AC mode, PEEP 5, , and RR 16. Hemodynamics on arrival to unit include hypotension requiring .02 levophed. Will resuscitate and stabilize. Plan for Pressure Support trials and extubation as able this afternoon.    Medical History:  Past Medical History:   Diagnosis Date    Alcohol abuse, uncomplicated 12/20/2022    Alcohol abuse    Cirrhosis (Multi)     Coronary artery disease     COVID-19     VACCINATED    Dizziness     Hyperlipidemia     Hypertension     Insomnia     Old myocardial infarction     History of myocardial infarction    Other specified postprocedural states     History of cardiac cath    Palpitations     Personal history of other diseases of the nervous system and sense organs     History of eustachian tube dysfunction    Personal history of pulmonary embolism     History of pulmonary embolism    Small bowel obstruction (Multi)     Thrombocytopenia (CMS-HCC)     Type 2 diabetes mellitus (Multi)      Past Surgical History:   Procedure Laterality Date    CARDIAC CATHETERIZATION N/A 8/30/2024    Procedure: Left Heart Cath, With LV;  Surgeon: Db Moser MD;  Location: [facility];  Service: Cardiovascular;  Laterality: N/A;  left heart cath at [place] with Dr. Casillas or Dr. Moser this week. Pt to hold Eliquis and Metforming 48 hours prior. He was advised ot increase his Isosorbide to120 mg daily.Non-fasitng labs for BMP and CBC to be done prior.    OTHER SURGICAL HISTORY  10/20/2021    Percutaneous transluminal coronary angioplasty      Medications Prior to Admission   Medication Sig Dispense Refill Last Dose    albuterol 90 mcg/actuation inhaler Inhale 2 puffs every 6 hours if needed for shortness of breath. 18 g 0 2024    apixaban (Eliquis) 5 mg tablet Take 1 tablet (5 mg) by mouth 2 times a day. 180 tablet 0 Past Week    aspirin 81 mg EC tablet Take 1 tablet (81 mg) by mouth once daily.   2024    isosorbide mononitrate ER (Imdur) 120 mg 24 hr tablet Take 1 tablet (120 mg) by mouth once daily. Do not crush or chew. 90 tablet 3 2024    lisinopril 10 mg tablet Take 1 tablet (10 mg) by mouth once daily. 90 tablet 3 Past Week    metFORMIN  mg 24 hr tablet Take 1 tablet (500 mg) by mouth once daily in the evening. Take with meals. 90 tablet 0 2024    metoprolol succinate XL (Toprol-XL) 50 mg 24 hr tablet Take 1 tablet (50 mg) by mouth once daily. 90 tablet 3 2024    nitroglycerin (Nitrostat) 0.4 mg SL tablet Place 1 tablet (0.4 mg) under the tongue every 5 minutes if needed for chest pain. May repeat dose every 5 minutes for up to 3 doses total. 90 tablet 1 Past Week    rosuvastatin (Crestor) 40 mg tablet Take 1 tablet (40 mg) by mouth once daily. 90 tablet 3 2024    triamcinolone (Kenalog) 0.1 % cream Apply 1 Application topically 2 times a day as needed (hisotry of dermatitis). Monday - Friday only (Patient not taking: Reported on 2024) 45 g 0 More than a month     Patient has no known allergies.  Social History     Tobacco Use    Smoking status: Former     Current packs/day: 0.00     Types: Cigarettes     Quit date: 2016     Years since quittin.7     Passive exposure: Never    Smokeless tobacco: Never   Vaping Use    Vaping status: Never Used   Substance Use Topics    Alcohol use: Not Currently    Drug use: Never     Family History   Problem Relation Name Age of Onset    Diabetes Mother      Other (Other) Father          Arteriosclerotic cardiovascular disease; cardiac disorder    Heart attack Father       Diabetes Sister      Other (Other) Other Grandfather         cardiac disorder       Hospital Medications:    aminocaproic acid, 1 g/hr, Last Rate: 3 g/hr (09/23/24 0912)  niCARdipine, 2.5-15 mg/hr          Current Facility-Administered Medications:     albumin human 5 % infusion 25 g, 25 g, intravenous, Once, Radha Luciano, APRN-CNP    albuterol 90 mcg/actuation inhaler 2 puff, 2 puff, inhalation, q6h PRN, Radha Luciano, APRN-CNP    alteplase (Cathflo Activase) injection 2 mg, 2 mg, intra-catheter, PRN, Radha Montoyasa, APRN-CNP    aminocaproic acid (Amicar) infusion, 1 g/hr, intravenous, Continuous, Lazara Medina MD, Last Rate: 30 mL/hr at 09/23/24 0912, 3 g/hr at 09/23/24 0912    aspirin EC tablet 81 mg, 81 mg, oral, Daily, Radha Luciano APRN-CNP    aspirin suppository 150 mg, 150 mg, rectal, Once, Radha Luciano APRN-CNP    calcium chloride 0.5 g in dextrose 5% 50 mL IV, 0.5 g, intravenous, q8h PRN, Radha Montoyasa, APRN-CNP    calcium chloride 1 g in dextrose 5% 50 mL IV, 1 g, intravenous, q8h PRN, Radha A Chalomosa, APRN-CNP    desmopressin (DDAVP) 32 mcg in sodium chloride 0.9% 50 mL IV, 0.3 mcg/kg (Dosing Weight), intravenous, Once, Lazara Medina MD    lactated Ringer's bolus 500 mL, 500 mL, intravenous, Once, Radha Luciano, APRN-CNP    magnesium sulfate 2 g in sterile water for injection 50 mL, 2 g, intravenous, q6h PRN, Radha Isabelmosa, APRN-CNP    magnesium sulfate 4 g in sterile water for injection 100 mL, 4 g, intravenous, q6h PRN, Radha Isabelmosa, APRN-CNP    niCARdipine (Cardene) 40 mg in sodium chloride 200 mL (0.2 mg/mL) infusion (premix), 2.5-15 mg/hr, intravenous, Continuous, MARLO Kramer    oxygen (O2) therapy, , inhalation, Continuous - Inhalation, MARLO Kramer    [START ON 9/24/2024] pantoprazole (ProtoNix) EC tablet 40 mg, 40 mg, oral, Daily before breakfast **OR** [START ON 9/24/2024] pantoprazole (ProtoNix) injection 40 mg, 40  mg, intravenous, Daily before breakfast, NAVARRO Kramer-CNP    potassium chloride 20 mEq in sterile water for injection 100 mL, 20 mEq, intravenous, q6h PRN, Radha Luciano APRN-CNP    potassium chloride 40 mEq in dextrose 5% 250 mL IV, 40 mEq, intravenous, q6h PRN, Radha Luciano APRN-CNP    protamine 555 mg in sodium chloride 0.9% 50 mL IV, 555 mg, intravenous, Once, Lazara Medina MD    rosuvastatin (Crestor) tablet 40 mg, 40 mg, oral, Daily, NAVARRO Krmaer-CNP    Facility-Administered Medications Ordered in Other Encounters:     aminocaproic acid (Amicar) injection, , intravenous, PRN, Sydnee G Sellers, APRN-CRNA, 5 g at 09/23/24 0904    cisatracurium (Nimbex) injection, , intravenous, PRN, Sydnee G Sellers, APRN-CRNA, 6 mg at 09/23/24 1240    esmolol (Brevibloc) injection, , intravenous, PRN, Sydnee G Sellers, APRN-CRNA, 10 mg at 09/23/24 1007    etomidate (Amidate) injection, , intravenous, PRN, Sydnee G Sellers, APRN-CRNA, 20 mg at 09/23/24 0905    fentaNYL PF (Sublimaze) injection, , intravenous, PRN, Sydnee G Sellers, APRN-CRNA, 50 mcg at 09/23/24 1003    hydrALAZINE (Apresoline) injection, , intravenous, PRN, Sydnee G Sellers, APRN-CRNA, 10 mg at 09/23/24 1010    lactated Ringer's infusion, , intravenous, Continuous PRN, Sydnee G Sellers, APRN-CRNA, Last Rate: 75 mL/hr at 09/23/24 0850, New Bag at 09/23/24 0850    midazolam (Versed) injection, , intravenous, PRN, Sydnee G Sellers, APRN-CRNA, 1 mg at 09/23/24 1204    nitroglycerin (Tridil) 50 mg in dextrose 5% 250 mL (0.2 mg/mL) infusion (premix), , intravenous, Continuous PRN, Sydnee G Sellers, APRN-CRNA, Stopped at 09/23/24 1207    nitroglycerin in 5 % dextrose 10 mL syringe, , intravenous, PRN, Sydnee G Sellers, APRN-CRNA, 100 mcg at 09/23/24 0931    norepinephrine (Levophed) 8 mg in sodium chloride 0.9% 250 mL (0.032 mg/mL) infusion (premix), , intravenous, Continuous PRN, Sydnee G Sellers, APRN-CRNA, Last Rate: 9.9 mL/hr at 09/23/24 1242, 0.05 mcg/kg/min at  09/23/24 1242    perfusion prime builder, , perfusion, Continuous PRN, Freeman Millard MD, 785 mL at 09/23/24 1206    phenylephrine (Kendell-Synephrine) injection, , intravenous, PRN, Sydnee G Savoy, APRN-CRNA, 100 mcg at 09/23/24 1121    propofol (Diprivan) injection, , intravenous, PRN, Sydnee G Savoy, APRN-CRNA, 50 mcg/kg/min at 09/23/24 1151    sodium bicarbonate 1 mEq/mL (8.4 %) injection, , intravenous, PRN, Freeman Millard MD, 50 mEq at 09/23/24 1233    sodium chloride 0.9% infusion, , intravenous, Continuous PRN, Sydnee G Savoy, APRN-CRNA, Last Rate: 50 mL/hr at 09/23/24 0850, New Bag at 09/23/24 0850    Review of Systems:  14 point review of systems was completed and negative except for those specially mention in my HPI    Physical Exam:    Heart Rate:  [71-83]   Temp:  [36.1 °C (97 °F)]   Resp:  [14-16]   BP: (123-136)/(68-69)   Height:  [182.9 cm (6')]   Weight:  [106 kg (232 lb 12.9 oz)]   SpO2:  [96 %-98 %]     Physical Exam  HENT:      Head: Normocephalic.      Mouth/Throat:      Mouth: Mucous membranes are moist.      Comments: ET tube with OG in place   Eyes:      Pupils: Pupils are equal, round, and reactive to light.   Cardiovascular:      Rate and Rhythm: Normal rate and regular rhythm.      Pulses: Normal pulses.      Heart sounds:      Friction rub present.   Pulmonary:      Breath sounds: No wheezing or rales.      Comments: Mechanically ventilated   Abdominal:      General: Abdomen is flat.      Palpations: Abdomen is soft.   Musculoskeletal:         General: No swelling.      Cervical back: Normal range of motion and neck supple.      Comments: Bandages to RLE and LUE   Skin:     General: Skin is warm and dry.      Capillary Refill: Capillary refill takes less than 2 seconds.   Neurological:      GCS: GCS eye subscore is 1. GCS verbal subscore is 1. GCS motor subscore is 4.      Deep Tendon Reflexes: Reflexes normal.       Objective:  I have reviewed all medications,  laboratory results, and imaging pertinent for today's encounter.    Intake/Output Summary (Last 24 hours) at 9/23/2024 1244  Last data filed at 9/23/2024 1243  Gross per 24 hour   Intake 3785 ml   Output 2300 ml   Net 1485 ml     Assessment/Plan  Neuro/Psych/Pain Ctrl/Sedation:  #Post-Op Pain  Arrived to ICU intubated, sedated, with paralytic on board. Consider reversal of NMB.  -- Neuro checks, CAM Assessment, Delirium precautions  -- Propofol for sedation, wean as able for SAT  -- PRN Dilaudid, oxycodone, Tylenol for pain control  -- Lidocaine patches    Respiratory/ENT:  #Post-Op Respiratory Insufficiency  #hx Pulmonary Embolism currently on Eliquis   -- Arrived to ICU intubated, mechanically ventilated  -- Continue sedation with propofol infusion, wean as tolerated, RASS goal -1 to 0  -- Once NMB reversed, initiate PS trials, extubate when PS trial passed and hemodynamically stable  -- Wean supplemental oxygen for spO2 goal > 92%  -- ABG as needed  -- Daily CXR  -- post op CXR with trace right pleural effusion vs pleural thickening   -- albuterol PRN   -- holding home eliquis     Cardiovascular:  #Multivessel CAD s/p CABG x 3   #Hx of HTN and hyperlipidemia   Patient underwent LIMA-LAD, RA-OM, SVG-PDA  -- Ventricular wires in place, unpaced post op; settings: back up rate of 60 bpm    -- 1 L pleural, 1 mediastinal chest tube, maintain to wall suction, monitor output, notify if > 100cc/hr   - mediastinal tube with less output post op (15 ml) compared to L pleural (40 ml)  -- Volume resuscitation postoperatively with 250cc boluses LR as needed and albumin 5%  -- PA in place, monitor hemodynamics  -- ECHO 55-60% EF, mild LVH   -- Wean vasoactive medications to MAP 70-90, CI > 2.2  -- Daily BMP, keep K >4, Mg > 2  -- Start aspirin and statin POD 0  -- Consider beta blocker POD 1 as hemodynamics allow  -- consider beginning colchicine POD 1 due to pericardial friction rub   -- PT/OT consult and OOB POD 1  -- CVS  following and appreciate further management  -- holding home antihypertensive medications   -- holding home eliquis     GI:  #hx Liver Cirrhosis with ETOH abuse  -- IV PPI while intubated  -- Once extubated will advance diet as able    Renal/Volume Status (Intra & Extravascular):  Baseline Cr ~<0.90  pre-operatively 0.97   -- Resuscitation post-op as clinically indicated  -- Received 20 mg lasix in OR  -- Consider diuresis POD 1 as hemodynamics and volume status allow  -- Harris catheter in place, strict I/O  -- Daily BMP and electrolyte repletion  -- post op phosphorus and calcium low, replaced both     Endocrine  #Hyperglycemia  #T2DM   No history of DM, likely stress induced  -- Strict blood glucose control post-operatively  -- Glucose goal   -- cardiac SSI   -- Holding home metformin     Infectious Disease:  No concern for active infection  -- Perioperative ancef x 48 hours  -- Monitor SIRS criteria    Heme/Onc:  #Acute Blood Loss Anemia Post-Op  #Chronic thrombocytopenia  Baseline Hgb 15  -- Received 1 unit of PLT post op   -- Strict chest tube output monitoring  -- Daily CBC, transfuse for Hgb goal > 7 or signs of active bleeding with hemodynamic instablity    ORTHO/MSK:  -- PT/OT, Cardiac Rehab POD 1    Ethics/Code Status:  FULL CODE    :  DVT Prophylaxis: Holding  GI Prophylaxis: IV PPI, convert to oral when no longer NPO  Bowel Regimen: Docusate, Senna, Miralax  Diet: NPO  CVC: RIJ CVC, RIJ PA Catheter  Jackie: Yes, L radial   Harris: Yes  Restraints: yes  Dispo: ICU    Critical Care Time:  45 minutes spent in preparing to see patient (I.e. review of medical records), evaluation of diagnostics (I.e. labs, imaging, etc.), documentation, discussing plan of care with patient/ family/ caregiver, and/ or coordination of care with multidisciplinary team. Time does not include completion of procedure time.     09/23/24 at 1:07 PM - Michelle Rogers PA-C  [facility]   Medical Critical  Care

## 2024-09-23 NOTE — ANESTHESIA PROCEDURE NOTES
Arterial Line:    Date/Time: 9/23/2024 7:54 AM    Staffing  Performed: attending   Authorized by: Rodolfo Cast MD    Performed by: Lazara Medina MD    An arterial line was placed. Procedure performed using surface landmarks.in the pre-op for the following indication(s): continuous blood pressure monitoring and blood sampling needed.    A  (size) (length), Angiocath (type) catheter was placed into the Right radial artery, secured by Tegaderm,   Seldinger technique used.  Events:  patient tolerated procedure well with no complications.

## 2024-09-23 NOTE — ANESTHESIA PREPROCEDURE EVALUATION
Patient: Yuri Reynolds    Procedure Information       Date/Time: 09/23/24 0700    Procedures:       Creation Bypass Graft Coronary Artery      CABG, 2 OR MORE VESSELS    Location: [facility] / [facility]    Surgeons: Freeman Millard MD            Relevant Problems   Cardiac   (+) Angina pectoris, unstable (Multi)   (+) Chest pain   (+) Coronary artery disease involving native coronary artery of native heart with angina pectoris   (+) Hyperlipidemia   (+) Hypertension      Pulmonary   (+) Pulmonary embolism without acute cor pulmonale, unspecified chronicity, unspecified pulmonary embolism type (Multi)      Liver   (+) Cirrhosis (Multi)   (+) Fatty liver      Endocrine   (+) Class 1 obesity with serious comorbidity and body mass index (BMI) of 33.0 to 33.9 in adult   (+) Type 2 diabetes mellitus without complication, without long-term current use of insulin (Multi)      Hematology   (+) Thrombocytopenia (CMS-HCC)      HEENT   (+) Bilateral sensorineural hearing loss   (+) Hearing loss, bilateral       Clinical information reviewed:   Tobacco  Allergies  Meds  Problems  Med Hx  Surg Hx   Fam Hx  Soc   Hx        NPO Detail:  NPO/Void Status  Carbohydrate Drink Given Prior to Surgery? : N  Date of Last Liquid: 09/23/24  Time of Last Liquid: 0500 (sips of water)  Date of Last Solid: 09/22/24  Time of Last Solid: 1930  Last Intake Type: Clear fluids; Food  Time of Last Void: 0500         Physical Exam    Airway  Mallampati: II  TM distance: >3 FB  Neck ROM: full     Cardiovascular - normal exam     Dental    Pulmonary - normal exam     Abdominal   (+) obese             Anesthesia Plan    History of general anesthesia?: yes  History of complications of general anesthesia?: no    ASA 4     general   (A-Line, PA catheter and HANG)  The patient is not a current smoker.  Patient did not smoke on day of procedure.    intravenous induction   Anesthetic plan and risks discussed with patient.    Plan  discussed with CRNA and attending.

## 2024-09-23 NOTE — ANESTHESIA PREPROCEDURE EVALUATION
Patient: Yuri Reynolds    Procedure Information       Date/Time: 09/23/24 0630    Procedure: Creation Bypass Graft Coronary Artery    Location: [facility] / [facility]    Surgeons: Freeman Millard MD            Relevant Problems   Cardiac   (+) Angina pectoris, unstable (Multi)   (+) Chest pain   (+) Coronary artery disease involving native coronary artery of native heart with angina pectoris   (+) Coronary artery disease of native artery of native heart with stable angina pectoris   (+) Hyperlipidemia   (+) Hypertension      Pulmonary   (+) Pulmonary embolism without acute cor pulmonale, unspecified chronicity, unspecified pulmonary embolism type (Multi)      Liver   (+) Cirrhosis (Multi)   (+) Fatty liver      Endocrine   (+) Class 1 obesity with serious comorbidity and body mass index (BMI) of 33.0 to 33.9 in adult   (+) Type 2 diabetes mellitus without complication, without long-term current use of insulin (Multi)      Hematology   (+) Thrombocytopenia (CMS-HCC)      HEENT   (+) Bilateral sensorineural hearing loss   (+) Hearing loss, bilateral       Clinical information reviewed:   Tobacco  Allergies  Meds   Med Hx  Surg Hx   Fam Hx  Soc Hx        NPO Detail:  NPO/Void Status  Carbohydrate Drink Given Prior to Surgery? : N  Date of Last Liquid: 09/23/24  Time of Last Liquid: 0500  Date of Last Solid: 09/22/24  Time of Last Solid: 1930  Last Intake Type: Clear fluids; Food  Time of Last Void: 0500         Physical Exam    Airway  Mallampati: II  TM distance: >3 FB  Neck ROM: full     Cardiovascular - normal exam     Dental    Pulmonary - normal exam     Abdominal - normal exam  (+) obese             Anesthesia Plan    History of general anesthesia?: yes  History of complications of general anesthesia?: no    ASA 4     general   (A-Line, PA catheter, HANG)  The patient is not a current smoker.  Patient did not smoke on day of procedure.    intravenous induction   Anesthetic plan and risks  discussed with patient.    Plan discussed with CRNA and attending.

## 2024-09-23 NOTE — ANESTHESIA PROCEDURE NOTES
Airway  Date/Time: 9/23/2024 9:09 AM  Urgency: elective    Airway not difficult    Staffing  Performed: attending   Authorized by: Lazara Medina MD    Performed by: NAVARRO Lovett-MIGUEL  Patient location during procedure: OR    Indications and Patient Condition  Indications for airway management: anesthesia  Spontaneous ventilation: present  Sedation level: moderate (conscious sedation)  Preoxygenated: yes  Mask difficulty assessment: 2 - vent by mask + OA or adjuvant +/- NMBA  No planned trial extubation    Final Airway Details  Final airway type: endotracheal airway      Successful airway: ETT  Cuffed: yes   Successful intubation technique: video laryngoscopy  Endotracheal tube insertion site: oral  Blade: [place]  Blade size: #4  ETT size (mm): 8.0  Cormack-Lehane Classification: grade I - full view of glottis  Placement verified by: chest auscultation and capnometry   Measured from: lips  ETT to lips (cm): 24  Number of attempts at approach: 1

## 2024-09-24 ENCOUNTER — TELEPHONE (OUTPATIENT)
Dept: HEMATOLOGY/ONCOLOGY | Facility: CLINIC | Age: 64
End: 2024-09-24
Payer: COMMERCIAL

## 2024-09-24 ENCOUNTER — APPOINTMENT (OUTPATIENT)
Dept: RADIOLOGY | Facility: HOSPITAL | Age: 64
DRG: 236 | End: 2024-09-24
Payer: COMMERCIAL

## 2024-09-24 ENCOUNTER — APPOINTMENT (OUTPATIENT)
Dept: CARDIOLOGY | Facility: HOSPITAL | Age: 64
DRG: 236 | End: 2024-09-24
Payer: COMMERCIAL

## 2024-09-24 LAB
ALBUMIN SERPL BCP-MCNC: 3.9 G/DL (ref 3.4–5)
ANION GAP SERPL CALC-SCNC: 10 MMOL/L (ref 10–20)
BLOOD EXPIRATION DATE: NORMAL
BUN SERPL-MCNC: 15 MG/DL (ref 6–23)
CA-I BLD-SCNC: 1.13 MMOL/L (ref 1.1–1.33)
CALCIUM SERPL-MCNC: 8.5 MG/DL (ref 8.6–10.3)
CHLORIDE SERPL-SCNC: 108 MMOL/L (ref 98–107)
CO2 SERPL-SCNC: 24 MMOL/L (ref 21–32)
CREAT SERPL-MCNC: 0.87 MG/DL (ref 0.5–1.3)
DISPENSE STATUS: NORMAL
EGFRCR SERPLBLD CKD-EPI 2021: >90 ML/MIN/1.73M*2
EJECTION FRACTION APICAL 4 CHAMBER: 65.5
EJECTION FRACTION: 65 %
ERYTHROCYTE [DISTWIDTH] IN BLOOD BY AUTOMATED COUNT: 14.6 % (ref 11.5–14.5)
GLUCOSE BLD MANUAL STRIP-MCNC: 105 MG/DL (ref 74–99)
GLUCOSE BLD MANUAL STRIP-MCNC: 132 MG/DL (ref 74–99)
GLUCOSE BLD MANUAL STRIP-MCNC: 133 MG/DL (ref 74–99)
GLUCOSE BLD MANUAL STRIP-MCNC: 144 MG/DL (ref 74–99)
GLUCOSE BLD MANUAL STRIP-MCNC: 147 MG/DL (ref 74–99)
GLUCOSE BLD MANUAL STRIP-MCNC: 154 MG/DL (ref 74–99)
GLUCOSE BLD MANUAL STRIP-MCNC: 178 MG/DL (ref 74–99)
GLUCOSE SERPL-MCNC: 151 MG/DL (ref 74–99)
HCT VFR BLD AUTO: 31.1 % (ref 41–52)
HGB BLD-MCNC: 10.7 G/DL (ref 13.5–17.5)
MAGNESIUM SERPL-MCNC: 2.11 MG/DL (ref 1.6–2.4)
MCH RBC QN AUTO: 28.5 PG (ref 26–34)
MCHC RBC AUTO-ENTMCNC: 34.4 G/DL (ref 32–36)
MCV RBC AUTO: 83 FL (ref 80–100)
NRBC BLD-RTO: 0 /100 WBCS (ref 0–0)
PHOSPHATE SERPL-MCNC: 4 MG/DL (ref 2.5–4.9)
PLATELET # BLD AUTO: 84 X10*3/UL (ref 150–450)
POTASSIUM SERPL-SCNC: 4.1 MMOL/L (ref 3.5–5.3)
PRODUCT BLOOD TYPE: 6200
PRODUCT CODE: NORMAL
RBC # BLD AUTO: 3.76 X10*6/UL (ref 4.5–5.9)
SODIUM SERPL-SCNC: 138 MMOL/L (ref 136–145)
UNIT ABO: NORMAL
UNIT NUMBER: NORMAL
UNIT RH: NORMAL
UNIT VOLUME: 345
WBC # BLD AUTO: 7.7 X10*3/UL (ref 4.4–11.3)

## 2024-09-24 PROCEDURE — 2500000004 HC RX 250 GENERAL PHARMACY W/ HCPCS (ALT 636 FOR OP/ED): Performed by: STUDENT IN AN ORGANIZED HEALTH CARE EDUCATION/TRAINING PROGRAM

## 2024-09-24 PROCEDURE — 93005 ELECTROCARDIOGRAM TRACING: CPT

## 2024-09-24 PROCEDURE — 2500000004 HC RX 250 GENERAL PHARMACY W/ HCPCS (ALT 636 FOR OP/ED): Performed by: NURSE PRACTITIONER

## 2024-09-24 PROCEDURE — 83735 ASSAY OF MAGNESIUM: CPT

## 2024-09-24 PROCEDURE — 2500000001 HC RX 250 WO HCPCS SELF ADMINISTERED DRUGS (ALT 637 FOR MEDICARE OP): Performed by: NURSE PRACTITIONER

## 2024-09-24 PROCEDURE — 71045 X-RAY EXAM CHEST 1 VIEW: CPT

## 2024-09-24 PROCEDURE — 71045 X-RAY EXAM CHEST 1 VIEW: CPT | Performed by: RADIOLOGY

## 2024-09-24 PROCEDURE — 99291 CRITICAL CARE FIRST HOUR: CPT

## 2024-09-24 PROCEDURE — 82330 ASSAY OF CALCIUM: CPT

## 2024-09-24 PROCEDURE — 97165 OT EVAL LOW COMPLEX 30 MIN: CPT | Mod: GO

## 2024-09-24 PROCEDURE — 99233 SBSQ HOSP IP/OBS HIGH 50: CPT | Performed by: NURSE PRACTITIONER

## 2024-09-24 PROCEDURE — 93010 ELECTROCARDIOGRAM REPORT: CPT | Performed by: INTERNAL MEDICINE

## 2024-09-24 PROCEDURE — 97161 PT EVAL LOW COMPLEX 20 MIN: CPT | Mod: GP

## 2024-09-24 PROCEDURE — 80069 RENAL FUNCTION PANEL: CPT

## 2024-09-24 PROCEDURE — 2500000004 HC RX 250 GENERAL PHARMACY W/ HCPCS (ALT 636 FOR OP/ED)

## 2024-09-24 PROCEDURE — 2500000001 HC RX 250 WO HCPCS SELF ADMINISTERED DRUGS (ALT 637 FOR MEDICARE OP)

## 2024-09-24 PROCEDURE — 82947 ASSAY GLUCOSE BLOOD QUANT: CPT

## 2024-09-24 PROCEDURE — 85027 COMPLETE CBC AUTOMATED: CPT

## 2024-09-24 PROCEDURE — 99223 1ST HOSP IP/OBS HIGH 75: CPT | Performed by: INTERNAL MEDICINE

## 2024-09-24 PROCEDURE — 2500000005 HC RX 250 GENERAL PHARMACY W/O HCPCS: Performed by: NURSE PRACTITIONER

## 2024-09-24 PROCEDURE — 2020000001 HC ICU ROOM DAILY

## 2024-09-24 PROCEDURE — 37799 UNLISTED PX VASCULAR SURGERY: CPT

## 2024-09-24 PROCEDURE — 2500000002 HC RX 250 W HCPCS SELF ADMINISTERED DRUGS (ALT 637 FOR MEDICARE OP, ALT 636 FOR OP/ED)

## 2024-09-24 PROCEDURE — 2500000005 HC RX 250 GENERAL PHARMACY W/O HCPCS

## 2024-09-24 RX ORDER — FUROSEMIDE 10 MG/ML
20 INJECTION INTRAMUSCULAR; INTRAVENOUS ONCE
Status: COMPLETED | OUTPATIENT
Start: 2024-09-24 | End: 2024-09-24

## 2024-09-24 RX ORDER — OXYCODONE HYDROCHLORIDE 5 MG/1
5 TABLET ORAL ONCE
Status: COMPLETED | OUTPATIENT
Start: 2024-09-24 | End: 2024-09-24

## 2024-09-24 RX ORDER — ACETAMINOPHEN 10 MG/ML
1000 INJECTION, SOLUTION INTRAVENOUS EVERY 6 HOURS SCHEDULED
Status: COMPLETED | OUTPATIENT
Start: 2024-09-24 | End: 2024-09-25

## 2024-09-24 RX ORDER — TRAMADOL HYDROCHLORIDE 50 MG/1
50 TABLET ORAL EVERY 6 HOURS PRN
Status: DISCONTINUED | OUTPATIENT
Start: 2024-09-24 | End: 2024-09-28 | Stop reason: HOSPADM

## 2024-09-24 RX ORDER — METOPROLOL TARTRATE 25 MG/1
25 TABLET, FILM COATED ORAL 2 TIMES DAILY
Status: DISCONTINUED | OUTPATIENT
Start: 2024-09-24 | End: 2024-09-26

## 2024-09-24 RX ORDER — COLCHICINE 0.6 MG/1
0.6 TABLET ORAL DAILY
Status: DISCONTINUED | OUTPATIENT
Start: 2024-09-24 | End: 2024-09-28 | Stop reason: HOSPADM

## 2024-09-24 ASSESSMENT — COGNITIVE AND FUNCTIONAL STATUS - GENERAL
EATING MEALS: A LITTLE
MOBILITY SCORE: 24
WALKING IN HOSPITAL ROOM: A LITTLE
DRESSING REGULAR LOWER BODY CLOTHING: A LOT
TOILETING: A LITTLE
TOILETING: A LITTLE
MOVING FROM LYING ON BACK TO SITTING ON SIDE OF FLAT BED WITH BEDRAILS: A LOT
DAILY ACTIVITIY SCORE: 16
CLIMB 3 TO 5 STEPS WITH RAILING: TOTAL
MOBILITY SCORE: 24
MOVING TO AND FROM BED TO CHAIR: A LITTLE
DRESSING REGULAR UPPER BODY CLOTHING: A LITTLE
HELP NEEDED FOR BATHING: A LITTLE
PERSONAL GROOMING: A LITTLE
DRESSING REGULAR UPPER BODY CLOTHING: A LITTLE
MOBILITY SCORE: 24
DAILY ACTIVITIY SCORE: 20
TURNING FROM BACK TO SIDE WHILE IN FLAT BAD: A LOT
STANDING UP FROM CHAIR USING ARMS: A LITTLE
MOBILITY SCORE: 14
DRESSING REGULAR LOWER BODY CLOTHING: A LITTLE
HELP NEEDED FOR BATHING: A LOT

## 2024-09-24 ASSESSMENT — PAIN - FUNCTIONAL ASSESSMENT

## 2024-09-24 ASSESSMENT — PAIN DESCRIPTION - ORIENTATION: ORIENTATION: LEFT

## 2024-09-24 ASSESSMENT — PAIN DESCRIPTION - LOCATION
LOCATION: CHEST

## 2024-09-24 ASSESSMENT — PAIN SCALES - GENERAL
PAINLEVEL_OUTOF10: 3
PAINLEVEL_OUTOF10: 6
PAINLEVEL_OUTOF10: 8
PAINLEVEL_OUTOF10: 7
PAINLEVEL_OUTOF10: 8
PAINLEVEL_OUTOF10: 2
PAINLEVEL_OUTOF10: 8
PAINLEVEL_OUTOF10: 6
PAINLEVEL_OUTOF10: 6
PAINLEVEL_OUTOF10: 7
PAINLEVEL_OUTOF10: 6
PAINLEVEL_OUTOF10: 6
PAINLEVEL_OUTOF10: 5 - MODERATE PAIN
PAINLEVEL_OUTOF10: 5 - MODERATE PAIN
PAINLEVEL_OUTOF10: 8
PAINLEVEL_OUTOF10: 9
PAINLEVEL_OUTOF10: 6
PAINLEVEL_OUTOF10: 8
PAINLEVEL_OUTOF10: 7

## 2024-09-24 ASSESSMENT — ACTIVITIES OF DAILY LIVING (ADL): BATHING_ASSISTANCE: MODERATE

## 2024-09-24 NOTE — PROGRESS NOTES
Occupational Therapy    Evaluation    Patient Name: Yuri Reynolds  MRN: [id number]  Department: [facility]  Room: 09/09-A  Today's Date: 9/24/2024  Time Calculation  Start Time: 1302  Stop Time: 1329  Time Calculation (min): 27 min      Assessment:  OT Assessment: Limited by pain, impaired balance and decreased overall strength.  Prognosis: Excellent  End of Session Communication: Bedside nurse  End of Session Patient Position: Up in chair, Alarm off, not on at start of session  OT Assessment Results: Decreased ADL status, Decreased endurance, Decreased functional mobility  Prognosis: Excellent  Plan:  Treatment Interventions: ADL retraining, Functional transfer training, Endurance training  OT Frequency: 2 times per week  OT Discharge Recommendations: Low intensity level of continued care  OT - OK to Discharge: Yes (Once medically appropriate.)  Treatment Interventions: ADL retraining, Functional transfer training, Endurance training    Subjective   General:  General  Reason for Referral: Recent surgery  Referred By: Emmett NICOLE  Past Medical History Relevant to Rehab: HLD, HTN, MI, CAD, DM, ETOH abuse, SBO, Cirrhosis, PE  Family/Caregiver Present: Yes  Caregiver Feedback: Wife present - supportive.  Prior to Session Communication: Bedside nurse (Cleared for therapy evaluation by RN.)  Patient Position Received: Bed, 3 rail up, Alarm off, not on at start of session  General Comment: Patient s/p elective CABG x3 9/23/24. CXR 9/24: persistent consolidation and volume loss L base, small L effusion.  Precautions:  Medical Precautions: Fall precautions  Post-Surgical Precautions: Move in the Tube    Vital Sign (Past 2hrs)        Date/Time Vitals Session Patient Position Pulse Resp SpO2 BP MAP (mmHg)               09/24/24 1302 --  --  84  --  97 %  104/59  --                                    Pain:  Pain Assessment  Pain Assessment: 0-10  0-10 (Numeric) Pain Score: 9  Pain Type: Acute pain  Pain Location:  Chest    Objective   Cognition:  Overall Cognitive Status: Within Functional Limits  Orientation Level: Oriented X4           Home Living:  Home Living Comments: Patient lives with wife in a split level house with 5 CATA with a HR. Bedroom on the upper level with 6 steps with a HR. Full bathroom on the main level and 2nd level; walk in shower on the main level and tub shower on the 2nd level - uses the walk in shower.  Prior Function:  Prior Function Comments: Patient ambulates independently without a device, does not own any DME. Independent with ADLs, wife completes IADLs. Denies falls in the past 3 months. Patient drives. Works full time.     ADL:  Eating Assistance:  (Setup/S)  Grooming Assistance:  (Setup/S)  Bathing Assistance: Moderate  UE Dressing Assistance: Minimal  LE Dressing Assistance: Moderate  Toileting Assistance with Device: Minimal  Activity Tolerance:     Bed Mobility/Transfers: Bed Mobility 1  Bed Mobility 1: Supine to sitting  Bed Mobility Comments 1: HOB elevated. Min A level.    Transfer 1  Technique 1: Sit to stand, Stand to sit  Transfer Device 1:  (FWW)  Trials/Comments 1: Patient transferred EOB to the recliner with a FWW at CGA level. Cues for adherence to MITT precautions.     Standing Balance:  Dynamic Standing Balance  Dynamic Standing-Comments: Fair     Strength:  Strength Comments: B/L UE  MMT not formally assessed d/t recent surgery, WFL during tasks.     Extremities: RUE   RUE : Within Functional Limits (AROM shoulder flex completed to 90 degrees.) and LUE   LUE: Within Functional Limits (AROM shoulder flex completed to 90 degrees.)    Outcome Measures:[facility] Daily Activity  Putting on and taking off regular lower body clothing: A lot  Bathing (including washing, rinsing, drying): A lot  Putting on and taking off regular upper body clothing: A little  Toileting, which includes using toilet, bedpan or urinal: A little  Taking care of personal grooming such as brushing teeth: A  little  Eating Meals: A little  Daily Activity - Total Score: 16    Education Documentation  Precautions, taught by Payal Garcia OT at 9/24/2024  2:21 PM.  Learner: Patient  Readiness: Acceptance  Method: Explanation  Response: Verbalizes Understanding  Comment: MITT precautions.    EDUCATION:  Education  Individual(s) Educated: Patient  Education Provided: POC discussed and agreed upon, Risk and benefits of OT discussed with patient or other, Fall precautons  Patient Response to Education: Patient/Caregiver Verbalized Understanding of Information    Goals:  Encounter Problems       Encounter Problems (Active)       OT Goals       Patient will complete household distance functional mobility at a mod I level.  (Progressing)       Start:  09/24/24    Expected End:  10/08/24            Patient will complete functional transfers at a mod I level.  (Progressing)       Start:  09/24/24    Expected End:  10/08/24            Patient will complete lower body dressing at a mod I level.  (Progressing)       Start:  09/24/24    Expected End:  10/08/24            Patient will complete toileting at a mod I level.  (Progressing)       Start:  09/24/24    Expected End:  10/08/24            Patient will demonstrate fair + dynamic standing balance during functional tasks. (Progressing)       Start:  09/24/24    Expected End:  10/08/24

## 2024-09-24 NOTE — CONSULTS
Nutrition Initial Assessment:   Nutrition Assessment    Reason for Assessment: Dietitian discretion    Patient is a 64 y.o. male presenting with coronary artery disease of native artery of native heart with stable angina.   Pt is s/p CABG x 3 on 9/23/24.      Nutrition History:  Energy Intake:  (no meal intakes recorded at this time, diet recently advanced to clears)  Food and Nutrient History: RDN self consult for ERAS. Met with pt who denies recent wt changes, states  lbs. Pt reports appetite had nott been as good as it normally is over the past month, states he had been dealing with some stomach discomfort. Pt denies following diet restrictions at home, denies using supplements at home. Pt denies GI symptoms at this time, denies chewing or swallowing difficulty. Will follow diet advancement, acceptance of supplements.  Vitamin/Herbal Supplement Use: none  Food Allergies/Intolerances:  None  GI Symptoms: None  Oral Problems: None       Anthropometrics:  Height: 182.9 cm (6')   Weight: 110 kg (242 lb 4.6 oz)   BMI (Calculated): 32.85  IBW/kg (Dietitian Calculated): 80.9 kg  Percent of IBW: 136 %       Weight History:   Wt Readings from Last 10 Encounters:   09/24/24 110 kg (242 lb 4.6 oz)   09/16/24 108 kg (237 lb 7 oz)   09/11/24 106 kg (233 lb 1.6 oz)   09/03/24 108 kg (237 lb)   09/23/24 106 kg (232 lb 12.9 oz)   08/28/24 109 kg (241 lb)   08/05/24 109 kg (241 lb)   07/30/24 108 kg (237 lb 14.4 oz)   07/22/24 108 kg (238 lb 8.6 oz)   04/10/24 109 kg (240 lb)      Weight Change %:  Weight History / % Weight Change: no significant wt changes over the past 5 months per chart review.  Significant Weight Loss: No    Nutrition Focused Physical Exam Findings:  defer:   Deferred as pt visually appears well-nourished with no signs of malnutrition    Subcutaneous Fat Loss:      Muscle Wasting:     Edema:  Edema: none  Physical Findings:  Skin: Positive (wound right leg, incision to sternum, left arm)    Nutrition  Significant Labs:  BMP Trend:   Results from last 7 days   Lab Units 09/24/24  0330 09/23/24  1604 09/20/24  1519   GLUCOSE mg/dL 151* 112* 113*   CALCIUM mg/dL 8.5* 7.6* 9.9   SODIUM mmol/L 138 144 134*   POTASSIUM mmol/L 4.1 3.6 4.2   CO2 mmol/L 24 23 24   CHLORIDE mmol/L 108* 113* 103   BUN mg/dL 15 14 20   CREATININE mg/dL 0.87 0.92 0.97    , A1C:  Lab Results   Component Value Date    HGBA1C 5.3 07/26/2024   , BG POCT trend:   Results from last 7 days   Lab Units 09/24/24  1104 09/24/24  0741 09/24/24  0430 09/24/24  0042 09/23/24 2001   POCT GLUCOSE mg/dL 178* 154* 133* 147* 164*        Nutrition Specific Medications:  Scheduled medications  aspirin, 81 mg, oral, Daily  aspirin, 150 mg, rectal, Once  docusate sodium, 100 mg, oral, TID  insulin lispro, 0-15 Units, subcutaneous, q4h  polyethylene glycol, 17 g, oral, BID  rosuvastatin, 40 mg, oral, Daily    I/O:    ;      Dietary Orders (From admission, onward)       Start     Ordered    09/24/24 0930  Adult diet Clear Liquid  Diet effective now        Comments: Advance to cardiac as tolerated   Question:  Diet type  Answer:  Clear Liquid    09/24/24 0929    09/23/24 1101  Oral nutritional supplements - Carmine  Until discontinued        Comments: Twice a day.   Question Answer Comment   Deliver with Breakfast    Deliver with Lunch    Select supplement: Carmine        09/23/24 1100                     Estimated Needs:   Total Energy Estimated Needs (kCal): 2200 kCal  Method for Estimating Needs: 20 kcal/kg ABW  Total Protein Estimated Needs (g): 121 g  Method for Estimating Needs: 1.5 g/kg IBW  Total Fluid Estimated Needs (mL): 2200 mL  Method for Estimating Needs: 1 ml/kcal or per MD        Nutrition Diagnosis   Malnutrition Diagnosis  Patient has Malnutrition Diagnosis: No    Nutrition Diagnosis  Patient has Nutrition Diagnosis: Yes  Diagnosis Status (1): New  Nutrition Diagnosis 1: Increased nutrient needs  Related to (1): increased metabolic demand in  healing  As Evidenced by (1): s/p CABG x 3, sternum incision       Nutrition Interventions/Recommendations         Nutrition Prescription:  Individualized Nutrition Prescription Provided for : Advance to Cardiac diet as medically appropriate. Carmine BID        Nutrition Interventions:   Interventions: Meals and snacks, Medical food supplement  Meals and Snacks: Fat-modified diet, General healthful diet, Mineral-modified diet  Goal: Consumes 3 meals per day  Medical Food Supplement: Commercial beverage  Goal: Carmine BID (provides 90 kcal and 2.5 g protein per packet).    Collaboration and Referral of Nutrition Care: Collaboration by nutrition professional with other providers  Goal: IDT rounds    Nutrition Education:   Met with pt for Cardiac diet and Carmine education. Discussed the following with pt:  - choosing lean protein and dairy, reducing saturated fat  - avoiding trans fat foods  - choosing heart heathy fats  - limiting sodium intake  - increasing fiber intake and reducing refined carbs  - discussed rational for Carmine BID for wound healing and need to continue for 14 days - coupon provided.     Both verbal and written education provided in the one-on-one setting. Pt verbalized understanding of information provided. All questions answered to pt satisfaction throughout visit.      Education Documentation  Nutrition Care Manual, taught by Maggie Maya RD, LD at 9/24/2024 11:51 AM.  Learner: Patient  Readiness: Acceptance  Method: Explanation, Handout  Response: Verbalizes Understanding  Comment: provided and reviewed heart healthy MNT and carmine handout. RDN contact information provided.        Nutrition Monitoring and Evaluation   Food/Nutrient Related History Monitoring  Monitoring and Evaluation Plan: Energy intake, Amount of food, Fluid intake  Energy Intake: Estimated energy intake  Criteria: Pt meets >75% of estimated energy needs  Fluid Intake: Estimated fluid intake  Criteria: Meets >75% of estimated fluid  needs  Amount of Food: Estimated amout of food, Medical food intake  Criteria: Pt consumes >75% of meals and supplements    Body Composition/Growth/Weight History  Monitoring and Evaluation Plan: Weight  Weight: Measured weight  Criteria: Maintains stable weight    Biochemical Data, Medical Tests and Procedures  Monitoring and Evaluation Plan: Glucose/endocrine profile, Electrolyte/renal panel  Electrolyte and Renal Panel: Sodium, Potassium, Phosphorus  Criteria: Electrolytes WNL  Glucose/Endocrine Profile: Glucose, casual  Criteria: BG within desirable range    Nutrition Focused Physical Findings  Monitoring and Evaluation Plan: Skin  Skin: Impaired wound healing  Criteria: Promote wound healing through adequate nutrition         Time Spent (min): 45 minutes

## 2024-09-24 NOTE — CONSULTS
Inpatient consult to Cardiology  Consult performed by: Omkar Newman DO  Consult ordered by: NAVARRO Hsu-CNP  Reason for consult: c/p cabg                                                          Date:   9/24/2024  Patient name:  Yuri Reynolds  Date of admission:  9/23/2024  5:26 AM  MRN:   [id number]  YOB: 1960  Time of Consult:  11:35 AM    Consulting Cardiologist: NAVARRO Duncan, CNP  Primary Cardiologist:  Dr. Kip Birmingham    Referring Provider: Dr. Mercedes      Admission Diagnosis:     Coronary artery disease of native artery of native heart with stable angina pectoris      History of Present Illness:      64 y.o. year old male patient with Past Medical History of HTN, HLD, CAD with remote hx of MI (1999, 2003) s/p PCI, former nicotine dependence (quit 2017), former alcohol abuse associated with alcoholic cirrhosis (quit drinking in 2019), chronic thrombocytopenia 2/2 cirrhosis, and recently diagnosed PE on Eliquis. He has been having symptoms of exertional chest pain associated with dyspnea since April which have continued to progress where activity is now limited. He was admitted to [facility] in July with chest pain and SOB; CTA at that time suspicious for PE and patient started on Eliquis. Echo at that time revealed normal LV function with EF 55-60%, mild concentric LVH, no RWMA's, and trivial to 1+ tricuspid regurgitation with RVSP 23mmHg. He was evaluated by Cardiology this admission who felt ischemic eval could be done electively. Pt discharged to home on Eliquis on 7/22. He underwent Lexiscan perfusion study 8/2 revealing a moderate inferoapical myocardial infarction and angiogram advised. He presented to [facility] electively today and underwent coronary angiogram under the care of Dr. Moser. This revealed severe triple vessel disease including  of Cx and surgical revascularization advised. CTS consulted for further recommendations.     He  was evaluated in the office on September 3 by Dr. Gan and scheduled for elective CABG on September 23.  Yesterday patient underwent CABG x 3  (LIMA-LAD, RA-OM, SVG-PDA) and recovered in the intensive care unit.  His normal cardiologist Dr. Birmingham      Allergies:     No Known Allergies      Past Medical History:     Past Medical History:   Diagnosis Date    Alcohol abuse, uncomplicated 12/20/2022    Alcohol abuse    Cirrhosis (Multi)     Coronary artery disease     COVID-19     VACCINATED    Dizziness     Hyperlipidemia     Hypertension     Insomnia     Old myocardial infarction     History of myocardial infarction    Other specified postprocedural states     History of cardiac cath    Palpitations     Personal history of other diseases of the nervous system and sense organs     History of eustachian tube dysfunction    Personal history of pulmonary embolism     History of pulmonary embolism    Small bowel obstruction (Multi)     Thrombocytopenia (CMS-HCC)     Type 2 diabetes mellitus (Multi)        Past Surgical History:     Past Surgical History:   Procedure Laterality Date    CARDIAC CATHETERIZATION N/A 8/30/2024    Procedure: Left Heart Cath, With LV;  Surgeon: Db Moser MD;  Location: [facility];  Service: Cardiovascular;  Laterality: N/A;  left heart cath at [facility] with Dr. Casillas or Dr. Moser this week. Pt to hold Eliquis and Metforming 48 hours prior. He was advised ot increase his Isosorbide to120 mg daily.Non-fasitng labs for BMP and CBC to be done prior.    OTHER SURGICAL HISTORY  10/20/2021    Percutaneous transluminal coronary angioplasty       Family History:     Family History   Problem Relation Name Age of Onset    Diabetes Mother      Other (Other) Father          Arteriosclerotic cardiovascular disease; cardiac disorder    Heart attack Father      Diabetes Sister      Other (Other) Other Grandfather         cardiac disorder       Social History:     Social History      Tobacco Use    Smoking status: Former     Current packs/day: 0.00     Types: Cigarettes     Quit date:      Years since quittin.7     Passive exposure: Never    Smokeless tobacco: Never   Vaping Use    Vaping status: Never Used   Substance Use Topics    Alcohol use: Not Currently    Drug use: Never       CURRENT INPATIENT MEDICATIONS    acetaminophen, 1,000 mg, intravenous, q6h URI  albumin human, 25 g, intravenous, Once  aspirin, 81 mg, oral, Daily  aspirin, 150 mg, rectal, Once  ceFAZolin, 2 g, intravenous, q8h  colchicine, 0.6 mg, oral, Daily  docusate sodium, 100 mg, oral, TID  insulin lispro, 0-15 Units, subcutaneous, q4h  lactated Ringer's, 500 mL, intravenous, Once  lidocaine, 1 patch, transdermal, q24h  metoprolol tartrate, 25 mg, oral, BID  mupirocin, 1 Application, Each Nostril, BID  oxygen, , inhalation, Continuous - Inhalation  polyethylene glycol, 17 g, oral, BID  rosuvastatin, 40 mg, oral, Daily           Current Outpatient Medications   Medication Instructions    albuterol 90 mcg/actuation inhaler 2 puffs, inhalation, Every 6 hours PRN    apixaban (ELIQUIS) 5 mg, oral, 2 times daily    aspirin 81 mg EC tablet 1 tablet, oral, Daily    isosorbide mononitrate ER (IMDUR) 120 mg, oral, Daily, Do not crush or chew.    lisinopril 10 mg, oral, Daily    metFORMIN XR (GLUCOPHAGE-XR) 500 mg, oral, Daily with evening meal    metoprolol succinate XL (TOPROL-XL) 50 mg, oral, Daily    nitroglycerin (NITROSTAT) 0.4 mg, sublingual, Every 5 min PRN, May repeat dose every 5 minutes for up to 3 doses total.    rosuvastatin (CRESTOR) 40 mg, oral, Daily    triamcinolone (Kenalog) 0.1 % cream 1 Application, Topical, 2 times daily PRN, Monday - Friday only        Review of Systems:      12 point review of systems was obtained in detail and is negative other than that detailed above.    Vital Signs:     Vitals:    24 0930 24 1000 24 1035 24 1105   BP: 122/63 126/65 144/56 118/60   Pulse: 92  94 104 90   Resp:    Temp:       TempSrc:       SpO2: 96% 96% 96% 96%   Weight:       Height:           Intake/Output Summary (Last 24 hours) at 2024 1135  Last data filed at 2024 1105  Gross per 24 hour   Intake 61883.57 ml   Output 9634 ml   Net 2348.57 ml       Wt Readings from Last 4 Encounters:   24 110 kg (242 lb 4.6 oz)   24 108 kg (237 lb 7 oz)   24 106 kg (233 lb 1.6 oz)   24 108 kg (237 lb)       Physical Examination:     Physical Exam  Vitals and nursing note reviewed.   HENT:      Head: Normocephalic.      Mouth/Throat:      Mouth: Mucous membranes are moist.   Eyes:      Pupils: Pupils are equal, round, and reactive to light.   Neck:      Comments: Right IJ catheter  Cardiovascular:      Rate and Rhythm: Normal rate and regular rhythm.      Heart sounds: Normal heart sounds, S1 normal and S2 normal.   Pulmonary:      Effort: Pulmonary effort is normal.      Breath sounds: Decreased air movement present. Wheezing present.      Comments: Poor inspiratory effort  Chest:      Comments: Chest tubes, incision clean dry and intact  Abdominal:      Palpations: Abdomen is soft.   Musculoskeletal:      Right lower le+ Edema present.      Left lower le+ Edema present.   Skin:     General: Skin is warm and dry.      Capillary Refill: Capillary refill takes less than 2 seconds.   Neurological:      General: No focal deficit present.      Mental Status: He is alert. Mental status is at baseline. He is disoriented.   Psychiatric:         Mood and Affect: Mood normal.           Lab:     CBC:   Results from last 7 days   Lab Units 24  0330 24  1604 24  1519   WBC AUTO x10*3/uL 7.7 17.9* 5.7   RBC AUTO x10*6/uL 3.76* 4.38* 5.19   HEMOGLOBIN g/dL 10.7* 12.7* 15.0   HEMATOCRIT % 31.1* 35.7* 41.7   MCV fL 83 82 80   MCH pg 28.5 29.0 28.9   MCHC g/dL 34.4 35.6 36.0   RDW % 14.6* 14.1 13.7   PLATELETS AUTO x10*3/uL 84* 140*  140* 86*     CMP:    Results  from last 7 days   Lab Units 09/24/24  0330 09/23/24  1604 09/20/24  1519   SODIUM mmol/L 138 144 134*   POTASSIUM mmol/L 4.1 3.6 4.2   CHLORIDE mmol/L 108* 113* 103   CO2 mmol/L 24 23 24   BUN mg/dL 15 14 20   CREATININE mg/dL 0.87 0.92 0.97   GLUCOSE mg/dL 151* 112* 113*   PROTEIN TOTAL g/dL  --   --  7.4  7.4   CALCIUM mg/dL 8.5* 7.6* 9.9   BILIRUBIN TOTAL mg/dL  --   --  1.0  1.0   ALK PHOS U/L  --   --  79  75   AST U/L  --   --  23  23   ALT U/L  --   --  30  29     BMP:    Results from last 7 days   Lab Units 09/24/24  0330 09/23/24  1604 09/20/24  1519   SODIUM mmol/L 138 144 134*   POTASSIUM mmol/L 4.1 3.6 4.2   CHLORIDE mmol/L 108* 113* 103   CO2 mmol/L 24 23 24   BUN mg/dL 15 14 20   CREATININE mg/dL 0.87 0.92 0.97   CALCIUM mg/dL 8.5* 7.6* 9.9   GLUCOSE mg/dL 151* 112* 113*     Magnesium:  Results from last 7 days   Lab Units 09/24/24  0330 09/23/24  1604   MAGNESIUM mg/dL 2.11 2.60*     Troponin:      BNP:     Lipid Panel:         Diagnostic Studies:   @No results found for this or any previous visit.    XR chest 1 view    Result Date: 9/24/2024  Interpreted By:  Sherwin Goldstein, STUDY: XR CHEST 1 VIEW;  9/23/2024 8:50 am   INDICATION: Signs/Symptoms:post line insertion; RN to call when ready.   COMPARISON: None.   ACCESSION NUMBER(S): [id number]   ORDERING CLINICIAN: SANTO COLEMAN   FINDINGS: CHEST/LUNGS: The cardiac and mediastinal silhouettes are within normal limits for the technique. No focal areas of consolidation are noted. No pneumothorax is seen.   Right-sided [place]-Usman catheter is in place with the tip terminating in the expected location of the proximal aspect of the main pulmonary trunk. Mild blunting of the right costophrenic angle may relate to a trace effusion or pleural thickening.   UPPER ABDOMEN: No remarkable upper abdominal findings.   OSSEOUS STRUCTURES: No acute changes.       [place]-Usman catheter tip overlies the proximal aspect of the main pulmonary trunk. Blunting of the right  costophrenic angle which may relate to a trace effusion or pleural thickening.   MACRO: None.   Signed by: Sherwin Goldstein 9/24/2024 8:59 AM Dictation workstation:   [id number]    XR chest 1 view    Result Date: 9/24/2024  Interpreted By:  Nacho Serrano, STUDY: XR CHEST 1 VIEW;  9/24/2024 5:42 am   INDICATION: Signs/Symptoms:Post op cardiac surgery.     COMPARISON: 09/23/2024.   ACCESSION NUMBER(S): [id number]   ORDERING CLINICIAN: PAULA URBAN   FINDINGS: CARDIOMEDIASTINAL SILHOUETTE: Right jugular line remains in place with tubing tip at the SVC level. Cardiomegaly, aortic prominence and postoperative changes of the mediastinum are unchanged. Cephalad directed midline probable mediastinal drain remains in place. Endotracheal tube and NG tube are no longer seen.   LUNGS: Left basilar chest tube is unchanged in position. Left retrocardiac and basilar consolidation with volume loss is again seen. Small left effusion not excluded. Mild right basilar atelectasis is present. No appreciable pneumothorax.   ABDOMEN: No remarkable upper abdominal findings.   BONES: Bones are stable.       1.  Persistent consolidation and volume loss in the left retrocardiac region and left base. Small left effusion not excluded.       MACRO: None.   Signed by: Nacho Serrano 9/24/2024 8:39 AM Dictation workstation:   [id number]    ECG 12 Lead    Result Date: 9/24/2024  Normal sinus rhythm ST elevation, consider early repolarization, pericarditis, or injury Abnormal ECG When compared with ECG of 23-SEP-2024 16:20, (unconfirmed) WA interval has decreased ST elevation now present in Lateral leads    XR chest 1 view    Result Date: 9/23/2024  Interpreted By:  Nacho Olvera, STUDY: XR CHEST 1 VIEW;  9/23/2024 4:19 pm   INDICATION: Signs/Symptoms:Post op cardiac surgery.     COMPARISON: 09/23/2024   ACCESSION NUMBER(S): [id number]   ORDERING CLINICIAN: PAULA URBAN   FINDINGS: AP radiograph of the chest was provided.   Stable  right-sided [place]-Usman catheter noted. There has been interval placement of endotracheal tube noted ending 4 cm above the roldan. There is a nasogastric tube noted coursing inferior to the diaphragm in satisfactory position.   CARDIOMEDIASTINAL SILHOUETTE: Cardiomediastinal silhouette is normal in size and configuration.   LUNGS: Interval development of region of airspace consolidation noted in the left mid lung field. There is a small left-sided pleural effusion.   ABDOMEN: No remarkable upper abdominal findings.   BONES: No acute osseous changes. There are sternotomy wires noted.       1.  Stable right-sided [place]-Usman catheter noted. There has been interval placement of endotracheal tube noted ending 4 cm above the roldan. There is a nasogastric tube noted coursing inferior to the diaphragm in satisfactory position. 2. Interval development of region of airspace consolidation noted in the left mid lung field. There is a small left-sided pleural effusion.         MACRO: None   Signed by: Nacho Olvera 9/23/2024 4:25 PM Dictation workstation:   [id number]    ECG 12 Lead    Result Date: 9/23/2024  Sinus rhythm with 1st degree AV block Low voltage QRS Borderline ECG When compared with ECG of 30-AUG-2024 11:42, T wave amplitude has decreased in Anterior leads        Radiology:     XR chest 1 view   Final Result   As above        MACRO:   None        Signed by: Sagar Colón 9/24/2024 11:22 AM   Dictation workstation:   [id number]      XR chest 1 view   Final Result   1.  Persistent consolidation and volume loss in the left retrocardiac   region and left base. Small left effusion not excluded.                  MACRO:   None.        Signed by: Nacho Serrano 9/24/2024 8:39 AM   Dictation workstation:   [id number]      XR chest 1 view   Final Result   1.  Stable right-sided [place]-Usman catheter noted. There has been   interval placement of endotracheal tube noted ending 4 cm above the   roldan. There is a nasogastric  tube noted coursing inferior to the   diaphragm in satisfactory position.   2. Interval development of region of airspace consolidation noted in   the left mid lung field. There is a small left-sided pleural effusion.                       MACRO:   None        Signed by: Nacho Olvera 2024 4:25 PM   Dictation workstation:   [id number]      XR chest 1 view   Final Result   [place]-Usman catheter tip overlies the proximal aspect of the main   pulmonary trunk. Blunting of the right costophrenic angle which may   relate to a trace effusion or pleural thickening.        MACRO:   None.        Signed by: Sherwin Goldstein 2024 8:59 AM   Dictation workstation:   [id number]      Anesthesia Intraoperative Transesophageal Echocardiogram    (Results Pending)     Date: 2024                      OR Location: [facility]     Name: Yuri Reynolds, : 1960, Age: 64 y.o., MRN: [id number], Sex: male     Diagnosis  Pre-op Diagnosis      * Coronary artery disease of native artery of native heart with stable angina pectoris [I25.118] Post-op Diagnosis     * Coronary artery disease of native artery of native heart with stable angina pectoris [I25.118]      Procedures  Creation Bypass Graft Coronary Artery x3 (LIMA-LAD, RA-OM, SVG-PDA), EVH LEFT ARM AND RIGHT LEG, INTROP HANG,STERNALOCK CHEST PLATING  [id number] - [place] CORONARY ARTERY BYP W/VEIN & ARTERY GRAFT 3 VEIN     Median sternotomy  2. Left forearm endoscopic radial artery harvest  3. Right thigh/lower leg endoscopic saphenous vein harvest  4. Central cannulation for cardiopulmonary bypass  5. CABG x 3: SVG to PDA, radial artery to OM, proximally piggy-backed from proximal vein, and LIMA to LAD  6. Sternal approximation with 5 regular sternal wires and 3 Sternalock wires with plates     Chest Tubes/Drains: 1 left pleural chest tube/ 1 mediastinal chest tube       Temporary Pacing Wires: Ventricular              -Settings:NA              -Underlying Rhythm:NSR     Permanent pacer/ICD:  "No              -Preoperative settings:               -Intra-op/ Postoperative settings:     Sternotomy performed by: Dr. Malik Md     Conduit Harvested by: left arm radial: Moose NEFF   right SV: Richard SA-C      Sternal Wires placed by: Moose NEFF      Arm/Leg/Groin Closure/Cutdown performed by: Arm: Moose NEFF   Leg: Richard SA-C      Cardio Pulmonary Bypass Time: 112 minutes  Cross-clamp Time: 91 minutes  Circulatory Arrest: No Time:      Is patient candidate for Emergency Re-sternotomy? No              -If yes, POD #10 is -    Surgeons      * Freeman Millard - Primary     Resident/Fellow/Other Assistant:  Surgeons and Role:  * No surgeons found with a matching role *  Dr. Mauro\"Diasty  Moose NEFF Richard SA-C   Procedure Summary  Anesthesia: General               ASA: IV  Anesthesia Staff: Anesthesiologist: Rodolfo Cast MD; Lazara Medina MD  CRNA: NAVARRO Lovett-CRNA  Perfusionist: Jessica Gandara  Estimated Blood Loss: 300mL  Intra-op Medications:       Administrations occurring from 0630 to 1335 on 09/23/24:   Medication Name Total Dose   aminocaproic acid (Amicar) infusion 13.15 g   ceFAZolin (Ancef) 2 g in dextrose (iso)  mL 4 g   heparin 1,000 unit/mL injection 42,400 Units 42,400 Units                   Anesthesia Record                   Intraprocedure I/O Totals                    Intake     Norepinephrine Drip 0.00 mL     The total shown is the total volume documented since Anesthesia Start was filed.     Aminocaproic Acid Drip 0.00 mL     The total shown is the total volume documented since Anesthesia Start was filed.     Nitroglycerin Drip 0.00 mL     The total shown is the total volume documented since Anesthesia Start was filed.     LR infusion 1500.00 mL     NaCl 0.9 % infusion 1000.00 mL     perfusion prime builder 785.00 mL     Cell Saver 470 mL     Total Intake 3755 mL           Output     Urine 1200 mL     Total Output 1200 mL           Net     Net Volume " 2555 mL             Specimen:   ID Type Source Tests Collected by Time   A :  Blood Blood, Venous PLATELET COUNT Freeman Millard MD 9/23/2024 1406         Staff:   Miguelitoulator: Daniel Owens Person: Alexandrea Gill Circulator: Barrington  Circulator: Kristy              Findings: Severe CAD     Complications:  None; patient tolerated the procedure well.     Disposition: ICU - intubated and hemodynamically stable.  Condition: stable  Specimens Collected:   ID Type Source Tests Collected by Time   A :  Blood Blood, Venous PLATELET COUNT Freeman Millard MD 9/23/2024 1406      Attending Attestation: I was present for the entire procedure.     Freeman Millard  Phone Number: [phone]  Problem List:     Patient Active Problem List   Diagnosis    Abnormal LFTs    Coronary artery disease involving native coronary artery of native heart with angina pectoris (CMS-HCC)    Bilateral sensorineural hearing loss    Chronic nasal congestion    Cirrhosis (Multi)    Cough    Dizziness    Elevated serum creatinine    Eustachian tube dysfunction    Fatty liver    Hearing loss, bilateral    History of PTCA    Hyperlipidemia    Hypersplenism    Hypertension    Inability to maintain erection    Insomnia    Memory changes    Nicotine dependence    Palpitations    Past myocardial infarction    Retained myringotomy tube    Small bowel obstruction (Multi)    Somnolence, daytime    Thrombocytopenia (CMS-HCC)    Type 2 diabetes mellitus without complication, without long-term current use of insulin (Multi)    History of alcohol abuse    Class 1 obesity with serious comorbidity and body mass index (BMI) of 33.0 to 33.9 in adult    Abnormal finding in urine    Alcohol abuse    Anal discharge    Bursitis of elbow    Difficulty breathing    Dysfunction of both eustachian tubes    History of cardiac catheterization    History of ear disorder    History of pulmonary embolism    Inflammatory dermatosis    Lateral  epicondylitis of both elbows    Portal hypertension (Multi)    Tinnitus, right ear    Pulmonary embolism without acute cor pulmonale, unspecified chronicity, unspecified pulmonary embolism type (Multi)    Chest pain    Angina pectoris, unstable (Multi)    Coronary artery disease of native artery of native heart with stable angina pectoris (CMS-HCC)    Chest pain, unspecified type       Assessment:   64-year-old gentleman seen evaluate the bedside postop day 1 in the intensive care unit in conjunction with Yaya Pereira RN, CNP.    Bedside examination evaluation performed by me.    Chart reviewed in detail discussed with the patient and staff.    Impression:  CAD with CABG x 3 (LIMA-LAD, RA-OM, SVG-PDA)   Hypertension  Hyperlipidemia  Former nicotine abuse  Former alcohol abuse with cirrhosis  History of PE      Plan:   Recommendation:  Postop day 1.  Alert and oriented in the ICU.  Remains in chair.  Telemetry shows normal sinus rhythm with no ectopy.  Overnight had normal sinus rhythm with occasional PVCs.  Supplemental O2  Monitor electrolytes, keep potassium greater than 4 and magnesium greater than 2  Postop pain control  Incentive spirometer and pulmonary toilet  Remove chest tubes, arterial line  Advance diet as tolerated  Aggressive PT/OT  Initiate beta-blocker, aspirin and statin  Agree with diuresis, monitor strict I's and O's and daily weights  SSI  Will continue to follow along with you    Omkar Newman DO,[facility]         Yaya Pereira [facility]  Adult Gerontology Acute Care Nurse Practitioner  [facility] [place] [place] [facility] [place]   [facility]  [phone]      Of note, this documentation is completed using the Dragon Dictation system (voice recognition software). There may be spelling and/or grammatical errors that were not corrected prior to final submission.      Electronically signed by Omkar Newman DO, on 9/24/2024 at 11:35 AM

## 2024-09-24 NOTE — PROGRESS NOTES
[facility] Critical Care Medicine   Progress Note    Date:  9/24/2024  Patient:  Yuri Reynolds  YOB: 1960  MRN:  [id number]   Admit Date:  9/23/2024  ========================================================================================================    No chief complaint on file.    History of Present Illness:  Yuri Reynolds is a 64 y.o. year old male patient with Past Medical History of HTN, HLD, CAD with remote hx of MI (1999, 2003) s/p PCI, former nicotine dependence (quit 2017), former alcohol abuse associated with alcoholic cirrhosis (quit drinking in 2019), chronic thrombocytopenia 2/2 cirrhosis, and recently diagnosed PE on Eliquis. He has been having symptoms of exertional chest pain associated with dyspnea since April which have continued to progress where activity is now limited. He was admitted to [facility] in July with chest pain and SOB; CTA at that time suspicious for PE and patient started on Eliquis. Echo at that time revealed normal LV function with EF 55-60%, mild concentric LVH, no RWMA's, and trivial to 1+ tricuspid regurgitation with RVSP 23mmHg. He was evaluated by Cardiology this admission who felt ischemic eval could be done electively. Pt discharged to home on Eliquis on 7/22. He underwent Lexiscan perfusion study 8/2 revealing a moderate inferoapical myocardial infarction and angiogram advised. He presented to [facility] electively today and underwent coronary angiogram under the care of Dr. Moser. This revealed severe triple vessel disease including  of Cx and surgical revascularization advised. CTS consulted for further recommendations.      On 9/03, patient was seen in office by Dr. Gan and scheduled for CABG on 9/23. Patient underwent CABG x 3 (LIMA-LAD, RA-OM, SVG-PDA) with Dr. Gan and arrived in the ICU at 1537.      Intra-Op Details:     Pump Time: 112 mins      Cross Clamp Time: 91     EBL: 500 cc     Crystalloids: 2.5 L      Colloids: plasma post op       Blood: none     Cell Saver: 470     UOP: 1200     Chest Tubes: 2 - Left pleural and mediastinal      Pacing Wires: ventricular      Intra-op Complications: pt was hypertensive at the beginning of the case & began oozing from catheter sites. This resolved once blood pressure was lowered.      Interval ICU Events:  POD 0: Patient admitted to ICU s/p CABG x 3 (LIMA-LAD, RA-OM, SVG-PDA). Intubated and mechanically ventilated. Vent settings include AC mode, PEEP 5, , and RR 16. Hemodynamics on arrival to unit include hypotension requiring .02 levophed. Will resuscitate and stabilize. Plan for Pressure Support trials and extubation as able this afternoon.  POD 1: extubated; patient began on colchicine, metoprolol, and lasix; no longer requiring norepinephrine for BP support; swan taken out overnight; remove chest tubes, art line, and ramos; work with PT/OT; advance to cardiac diet as able     Medical History:  Past Medical History:   Diagnosis Date    Alcohol abuse, uncomplicated 12/20/2022    Alcohol abuse    Cirrhosis (Multi)     Coronary artery disease     COVID-19     VACCINATED    Dizziness     Hyperlipidemia     Hypertension     Insomnia     Old myocardial infarction     History of myocardial infarction    Other specified postprocedural states     History of cardiac cath    Palpitations     Personal history of other diseases of the nervous system and sense organs     History of eustachian tube dysfunction    Personal history of pulmonary embolism     History of pulmonary embolism    Small bowel obstruction (Multi)     Thrombocytopenia (CMS-HCC)     Type 2 diabetes mellitus (Multi)      Past Surgical History:   Procedure Laterality Date    CARDIAC CATHETERIZATION N/A 8/30/2024    Procedure: Left Heart Cath, With LV;  Surgeon: Db Moser MD;  Location: [facility];  Service: Cardiovascular;  Laterality: N/A;  left heart cath at [facility] with Dr. Casillas or Dr. Moser this week. Pt to  hold Eliquis and Metforming 48 hours prior. He was advised ot increase his Isosorbide to120 mg daily.Non-fasitng labs for BMP and CBC to be done prior.    OTHER SURGICAL HISTORY  10/20/2021    Percutaneous transluminal coronary angioplasty     Medications Prior to Admission   Medication Sig Dispense Refill Last Dose    albuterol 90 mcg/actuation inhaler Inhale 2 puffs every 6 hours if needed for shortness of breath. 18 g 0 2024    apixaban (Eliquis) 5 mg tablet Take 1 tablet (5 mg) by mouth 2 times a day. 180 tablet 0 Past Week    aspirin 81 mg EC tablet Take 1 tablet (81 mg) by mouth once daily.   2024    isosorbide mononitrate ER (Imdur) 120 mg 24 hr tablet Take 1 tablet (120 mg) by mouth once daily. Do not crush or chew. 90 tablet 3 2024    lisinopril 10 mg tablet Take 1 tablet (10 mg) by mouth once daily. 90 tablet 3 Past Week    metFORMIN  mg 24 hr tablet Take 1 tablet (500 mg) by mouth once daily in the evening. Take with meals. 90 tablet 0 2024    metoprolol succinate XL (Toprol-XL) 50 mg 24 hr tablet Take 1 tablet (50 mg) by mouth once daily. 90 tablet 3 2024    nitroglycerin (Nitrostat) 0.4 mg SL tablet Place 1 tablet (0.4 mg) under the tongue every 5 minutes if needed for chest pain. May repeat dose every 5 minutes for up to 3 doses total. 90 tablet 1 Past Week    rosuvastatin (Crestor) 40 mg tablet Take 1 tablet (40 mg) by mouth once daily. 90 tablet 3 2024    triamcinolone (Kenalog) 0.1 % cream Apply 1 Application topically 2 times a day as needed (hisotry of dermatitis). Monday - Friday only (Patient not taking: Reported on 2024) 45 g 0 More than a month     Patient has no known allergies.  Social History     Tobacco Use    Smoking status: Former     Current packs/day: 0.00     Types: Cigarettes     Quit date: 2016     Years since quittin.7     Passive exposure: Never    Smokeless tobacco: Never   Vaping Use    Vaping status: Never Used   Substance Use Topics     Alcohol use: Not Currently    Drug use: Never     Family History   Problem Relation Name Age of Onset    Diabetes Mother      Other (Other) Father          Arteriosclerotic cardiovascular disease; cardiac disorder    Heart attack Father      Diabetes Sister      Other (Other) Other Grandfather         cardiac disorder       Hospital Medications:    lactated Ringer's, 50 mL/hr, Last Rate: Stopped (09/24/24 0600)  norepinephrine, 0-1 mcg/kg/min (Dosing Weight), Last Rate: Stopped (09/23/24 1900)          Current Facility-Administered Medications:     acetaminophen (Tylenol) tablet 650 mg, 650 mg, oral, q6h, NAVARRO Kramer-CNP, 650 mg at 09/24/24 0433    albumin human 5 % infusion 25 g, 25 g, intravenous, Once, MARLO Hsu    albuterol 90 mcg/actuation inhaler 2 puff, 2 puff, inhalation, q6h PRN, MARLO Hsu    alteplase (Cathflo Activase) injection 2 mg, 2 mg, intra-catheter, PRN, MARLO Hsu    aspirin EC tablet 81 mg, 81 mg, oral, Daily, MARLO Hsu    aspirin suppository 150 mg, 150 mg, rectal, Once, MARLO Hsu    bisacodyl (Dulcolax) EC tablet 10 mg, 10 mg, oral, Daily PRN, MARLO Kramer    calcium chloride 0.5 g in dextrose 5% 50 mL IV, 0.5 g, intravenous, q8h PRN, NAVARRO Hsu-CNP    calcium chloride 1 g in dextrose 5% 50 mL IV, 1 g, intravenous, q8h PRN, MARLO Hsu    ceFAZolin (Ancef) 2 g in dextrose (iso)  mL, 2 g, intravenous, q8h, MARLO Kramer, Stopped at 09/24/24 0657    dextrose 50 % injection 25 g, 25 g, intravenous, q15 min PRN **OR** glucagon (Glucagen) injection 1 mg, 1 mg, intramuscular, q15 min PRN, Radha A Komosa, APRN-CNP    docusate sodium (Colace) capsule 100 mg, 100 mg, oral, TID, Radha Luciano, APRN-CNP    HYDROmorphone (Dilaudid) injection 0.5 mg, 0.5 mg, intravenous, q15 min PRN, Radha Luciano, APRN-CNP, 0.5 mg at 09/24/24  0433    insulin lispro (HumaLOG) injection 0-15 Units, 0-15 Units, subcutaneous, q4h, MARLO Hsu    lactated Ringer's bolus 500 mL, 500 mL, intravenous, Once, MARLO Hsu    lactated Ringer's infusion, 50 mL/hr, intravenous, Continuous, MARLO Kramer, Stopped at 09/24/24 0600    lidocaine 4 % patch 1 patch, 1 patch, transdermal, q24h, MARLO Kramer, 1 patch at 09/23/24 1902    magnesium citrate solution 297 mL, 297 mL, oral, Once PRN, MARLO Kramer    magnesium sulfate 2 g in sterile water for injection 50 mL, 2 g, intravenous, q6h PRN, MARLO Hsu    magnesium sulfate 4 g in sterile water for injection 100 mL, 4 g, intravenous, q6h PRN, MARLO Hsu    metoclopramide (Reglan) tablet 10 mg, 10 mg, oral, q6h PRN **OR** metoclopramide (Reglan) injection 10 mg, 10 mg, intravenous, q6h PRN, Olman Osman MD    mupirocin (Bactroban) 2 % ointment 1 Application, 1 Application, Each Nostril, BID, MARLO Kramer, 1 Application at 09/23/24 2108    naloxone (Narcan) injection 0.2 mg, 0.2 mg, intravenous, q5 min PRN, MARLO Kramer    norepinephrine (Levophed) 8 mg in dextrose 5% 250 mL (0.032 mg/mL) infusion (premix), 0-1 mcg/kg/min (Dosing Weight), intravenous, Continuous, MARLO Kramer, Stopped at 09/23/24 1900    ondansetron (Zofran) injection 4 mg, 4 mg, intravenous, q8h PRN, Olman Osman MD, 4 mg at 09/24/24 0650    oxyCODONE (Roxicodone) immediate release tablet 10 mg, 10 mg, oral, q4h PRN, MARLO Kramer, 10 mg at 09/24/24 0538    oxyCODONE (Roxicodone) immediate release tablet 5 mg, 5 mg, oral, q4h PRN, MARLO Kramer    oxygen (O2) therapy, , inhalation, Continuous - Inhalation, MARLO Hsu, 2 L/min at 09/23/24 1932    pantoprazole (ProtoNix) EC tablet 40 mg, 40 mg, oral, Daily before breakfast, 40 mg at 09/24/24  0538 **OR** pantoprazole (ProtoNix) injection 40 mg, 40 mg, intravenous, Daily before breakfast, NAVARRO Kramer-CNP    polyethylene glycol (Glycolax, Miralax) packet 17 g, 17 g, oral, BID, NAVARRO Kramer-CNP    potassium chloride 20 mEq in sterile water for injection 100 mL, 20 mEq, intravenous, q6h PRN, NAVARRO Hsu-CNP    potassium chloride 40 mEq in dextrose 5% 250 mL IV, 40 mEq, intravenous, q6h PRN, Jay Christine APRN-CNP    rosuvastatin (Crestor) tablet 40 mg, 40 mg, oral, Daily, NAVARRO Hsu-CNP    Review of Systems:  14 point review of systems was completed and negative except for those specially mention in my HPI    Physical Exam:  Heart Rate:  []   Temp:  [36.1 °C (97 °F)-37.6 °C (99.7 °F)]   Resp:  [3-31]   BP: (123-136)/(52-69)   Height:  [182.9 cm (6')]   Weight:  [106 kg (232 lb 12.9 oz)-110 kg (242 lb 4.6 oz)]   SpO2:  [92 %-98 %]     Physical Exam  HENT:      Head: Normocephalic.      Mouth/Throat:      Mouth: Mucous membranes are moist.      Comments: ET tube with OG in place   Eyes:      Pupils: Pupils are equal, round, and reactive to light.   Cardiovascular:      Rate and Rhythm: Normal rate and regular rhythm.      Pulses: Normal pulses.      Heart sounds:      Friction rub present.   Pulmonary:      Breath sounds: No wheezing or rales.      Comments: Mechanically ventilated   Abdominal:      General: Abdomen is flat.      Palpations: Abdomen is soft.   Musculoskeletal:         General: No swelling.      Cervical back: Normal range of motion and neck supple.      Comments: Bandages to RLE and LUE   Skin:     General: Skin is warm and dry.      Capillary Refill: Capillary refill takes less than 2 seconds.   Neurological:      GCS: GCS eye subscore is 1. GCS verbal subscore is 1. GCS motor subscore is 4.      Deep Tendon Reflexes: Reflexes normal.       Objective:  I have reviewed all medications, laboratory results, and imaging pertinent for  today's encounter.    Intake/Output Summary (Last 24 hours) at 9/24/2024 0732  Last data filed at 9/24/2024 0657  Gross per 24 hour   Intake 54957.57 ml   Output 9534 ml   Net 2448.57 ml     Assessment/Plan  Neuro/Psych/Pain Ctrl/Sedation:  #Post-Op Pain  Arrived to ICU intubated, sedated, with paralytic on board. Consider reversal of NMB.  -- Neuro checks, CAM Assessment, Delirium precautions  -- PRN Dilaudid, oxycodone, Tylenol for pain control  -- Lidocaine patches    Respiratory/ENT:  #Post-Op Respiratory Insufficiency  #hx Pulmonary Embolism currently on Eliquis   -- Extubated on 9/23, POD 0  -- 3 L NC currently   -- Wean supplemental oxygen for spO2 goal > 92%  -- ABG as needed  -- Daily CXR  -- POD 1 CXR with Persistent consolidation and volume loss in the left retrocardiac region and left base. Small left effusion not excluded.   -- albuterol PRN   -- incentive spirometry   -- holding home eliquis     Cardiovascular:  #Multivessel CAD s/p CABG x 3   #Hx of HTN and hyperlipidemia   Patient underwent LIMA-LAD, RA-OM, SVG-PDA  -- Ventricular wires in place, unpaced post op; settings:   -- 1 L pleural, 1 mediastinal chest tube, maintain to wall suction, monitor output, notify if > 100cc/hr; remove POD 1  -- PA removed overnight   -- ECHO 55-60% EF, mild LVH   -- Daily BMP, keep K >4, Mg > 2  -- continue ASA and statin   -- begin metoprolol 25 mg BID  -- begin colchicine due to pericardial friction rub   -- PT/OT consult and OOB POD 1  -- CVS following and appreciate further management  -- holding home antihypertensive medications   -- holding home eliquis     GI:  #hx Liver Cirrhosis with ETOH abuse  -- converting to oral PPI now that pt is extubated   -- Once extubated will advance diet as able    Renal/Volume Status (Intra & Extravascular):  Baseline Cr ~<0.90  pre-operatively 0.97   -- Resuscitation post-op as clinically indicated  -- maintenance fluids including LR @ 50 ml/hr   -- Consider diuresis POD 1 as  hemodynamics and volume status allow  -- Harris catheter in place, strict I/O  -- Daily BMP and electrolyte repletion  -- replaced calcium and phosphate this am     Endocrine  #Hyperglycemia  #T2DM   -- Strict blood glucose control post-operatively  -- Glucose goal   -- cardiac SSI   -- Holding home metformin     Infectious Disease:  No concern for active infection  -- Perioperative ancef x 48 hours  -- Monitor SIRS criteria    Heme/Onc:  #Acute Blood Loss Anemia Post-Op  #Chronic thrombocytopenia  Baseline Hgb 15  -- Received 1 unit of PLT post op   -- PLT 84 POD 1  -- Hgb 10.7 POD 1  -- Strict chest tube output monitoring while in place  -- Daily CBC, transfuse for Hgb goal > 7 or signs of active bleeding with hemodynamic instablity  -- deferring DVT ppx due to low PLT     ORTHO/MSK:  -- PT/OT, Cardiac Rehab POD 1    Ethics/Code Status:  FULL CODE    :  DVT Prophylaxis: Holding due to low PLT   GI Prophylaxis: oral PPI when diet advanced  Bowel Regimen: Docusate, Senna, Miralax  Diet: advance to cardiac diet  CVC: JULES CVC, JULES PA Catheter  Jackie: Yes, L radial; remove today    Harris: Yes; remove today   Restraints: yes  Dispo: ICU     Critical Care Time:  45 minutes spent in preparing to see patient (I.e. review of medical records), evaluation of diagnostics (I.e. labs, imaging, etc.), documentation, discussing plan of care with patient/ family/ caregiver, and/ or coordination of care with multidisciplinary team. Time does not include completion of procedure time.     09/24/24 at 7:32 AM - Michelle Rogers PA-C  [facility]   Medical Critical Care

## 2024-09-24 NOTE — PROGRESS NOTES
09/24/24 1559   Discharge Planning   Living Arrangements Spouse/significant other   Support Systems Spouse/significant other   Assistance Needed PTA - lives with wife in a split level home, 5 steps down with HR from garage entrance. Bath on main level, walk in shower no chair or GB. Bedroom is up 2 flights of stairs (6 steps each) with handrail. Independent with mobility and  ADLs. Spouse helps with IADLs. Drives. Works full time as .   Type of Residence Private residence   Number of Stairs to Enter Residence 4   Number of Stairs Within Residence 0   Do you have animals or pets at home? No   Home or Post Acute Services In home services   Type of Home Care Services Home nursing visits;Home OT;Home PT   Expected Discharge Disposition Home H   Does the patient need discharge transport arranged? No   Patient Choice   Provider Choice list and CMS website (https://medicare.gov/care-compare#search) for post-acute Quality and Resource Measure Data were provided and reviewed with: Patient   Patient / Family choosing to utilize agency / facility established prior to hospitalization No     POD 1 for elective CABG x3 (LIMA-LAD, RA-OM, SVG-PDA) with Dr Gan. Pt extubated, on 3L NC. Chest tubes x2. PCP is Dr Harding, last seen in July for medical clearance for surgery. PT/OT [facility] of 16/16, recommending low intensity needs at [place]. Anticipate C at [place] for SN, PT, and OT.

## 2024-09-24 NOTE — PROGRESS NOTES
Yuri Reynolds is a 64 y.o. male on day 1 of admission presenting with Coronary artery disease of native artery of native heart with stable angina pectoris.    Subjective   64-year-old male with history of coronary artery disease with remote MI in 1999 and 2003, prior stenting to the RCA, hypertension, hyperlipidemia, prior tobacco use, remote PE on Eliquis anticoagulation, type 2 diabetes mellitus, chronic thrombocytopenia, and chronic cirrhosis in the setting of prior heavy alcohol use.  The patient has completely abstained from alcohol for many years.  Developed worsening exertional dyspnea and chest pain that increased in frequency and severity, underwent cardiac catheterization on 8/30 with findings of severe multivessel disease best amenable to surgical revascularization.  Due to his issues of chronic cirrhosis and thrombocytopenia, patient was referred to both hepatology and hematology for preoperative risk stratification and determined to be suitable from a risk standpoint to undergo surgery.  On 9/23, patient was admitted for elective coronary artery bypass grafting x 2 with LIMA to the LAD, radial graft to the obtuse marginal, and saphenous vein graft to the PDA with sternal plating.  Ventricular wires were placed, patient did not require pacing.  Returned to ICU in stable condition with mediastinal and 1 left pleural chest tube, on low-dose Levophed which was able to be tapered off shortly after procedure.  He was extubated several hours after return to ICU.  Overnight course has been uneventful.  Postoperative day 1, patient is afebrile, he is maintaining normal sinus rhythm, he is normotensive with systolic blood pressure 120-130 off pressors.  Saturations are adequate on 3 L nasal cannula, chest x-ray showing some atelectasis/effusion at left base, spirometry volumes near 750 mL.  Overnight fluid balance 2.4 L, weight is 5 kg elevated from baseline.  Will be initiated on metoprolol 25 mg twice daily,  and diuresis will be instituted with Lasix 20 mg IV x 1, further adjustments based on response.  PA catheter, Harris catheter, arterial line and chest tubes will be removed today in sequential fashion.  DVT phylaxis will be held due to thrombocytopenia.  Noted to have faint pericardial rub and mild diffuse ST elevation colchicine has been initiated.  Postoperative pain is well-controlled, patient does feel slightly nauseous with narcotics, tramadol has been added as alternative.  Will maintain clear liquids and advance to cardiac diet once nausea is abated.  Out of bed to chair, PT/OT following.       Objective     Physical Exam  Constitutional:       General: He is not in acute distress.  HENT:      Head: Normocephalic.      Nose: Nose normal.      Mouth/Throat:      Mouth: Mucous membranes are moist.   Eyes:      Pupils: Pupils are equal, round, and reactive to light.   Neck:      Comments: RIJ Cordis in place  Cardiovascular:      Rate and Rhythm: Normal rate and regular rhythm.      Pulses: Normal pulses.      Heart sounds:      Friction rub present.      Comments: Ventricular wires insulated, has not required pacing  Pulmonary:      Comments: Managed inspiratory effort, spirometry volumes 750 mL  Sternum stable, sternotomy dressing clean dry and intact  Diminished breath sounds at bases otherwise clear to auscultation  Mediastinal and pleural chest tubes with tapering serosanguineous output, to be removed today  Abdominal:      General: There is no distension.      Palpations: Abdomen is soft.      Comments: Hypoactive bowel signs, last bowel movement preoperatively   Genitourinary:     Comments: Harris draining clear yellow to be removed today  Musculoskeletal:      Cervical back: Normal range of motion.      Comments: Trace bilateral lower extremity edema   Skin:     General: Skin is warm and dry.      Comments: Left radial incision well-approximated, mild ecchymosis  Right lower extremity EVH incision  ecchymotic, no hematoma, well approximated   Neurological:      General: No focal deficit present.      Mental Status: He is alert and oriented to person, place, and time.   Psychiatric:         Mood and Affect: Mood normal.       Last Recorded Vitals  Blood pressure 126/52, pulse 90, temperature 37.6 °C (99.7 °F), resp. rate 15, height 1.829 m (6'), weight 110 kg (242 lb 4.6 oz), SpO2 96%.  Intake/Output last 3 Shifts:  I/O last 3 completed shifts:  In: 84882.6 (109 mL/kg) [I.V.:3817.6 (34.7 mL/kg); Blood:470; IV Piggyback:1695]  Out: 9534 (86.8 mL/kg) [Urine:9050 (2.3 mL/kg/hr); Chest Tube:484]  Weight: 109.9 kg     Relevant Results  Scheduled medications  acetaminophen, 650 mg, oral, q6h  albumin human, 25 g, intravenous, Once  aspirin, 81 mg, oral, Daily  aspirin, 150 mg, rectal, Once  ceFAZolin, 2 g, intravenous, q8h  colchicine, 0.6 mg, oral, Daily  docusate sodium, 100 mg, oral, TID  insulin lispro, 0-15 Units, subcutaneous, q4h  lactated Ringer's, 500 mL, intravenous, Once  lidocaine, 1 patch, transdermal, q24h  metoprolol tartrate, 25 mg, oral, BID  mupirocin, 1 Application, Each Nostril, BID  oxygen, , inhalation, Continuous - Inhalation  pantoprazole, 40 mg, oral, Daily before breakfast   Or  pantoprazole, 40 mg, intravenous, Daily before breakfast  polyethylene glycol, 17 g, oral, BID  rosuvastatin, 40 mg, oral, Daily      Continuous medications  norepinephrine, 0-1 mcg/kg/min (Dosing Weight), Last Rate: Stopped (09/23/24 1900)      PRN medications  PRN medications: albuterol, alteplase, bisacodyl, calcium chloride, calcium chloride, dextrose **OR** glucagon, HYDROmorphone, magnesium citrate, magnesium sulfate, magnesium sulfate, metoclopramide **OR** metoclopramide, naloxone, ondansetron, oxyCODONE, oxyCODONE, potassium chloride, potassium chloride, traMADol         XR chest 1 view    Result Date: 9/24/2024  Interpreted By:  Sherwin Goldstein, STUDY: XR CHEST 1 VIEW;  9/23/2024 8:50 am   INDICATION:  Signs/Symptoms:post line insertion; RN to call when ready.   COMPARISON: None.   ACCESSION NUMBER(S): [id number]   ORDERING CLINICIAN: SANTO COLEMAN   FINDINGS: CHEST/LUNGS: The cardiac and mediastinal silhouettes are within normal limits for the technique. No focal areas of consolidation are noted. No pneumothorax is seen.   Right-sided Spooner-Usman catheter is in place with the tip terminating in the expected location of the proximal aspect of the main pulmonary trunk. Mild blunting of the right costophrenic angle may relate to a trace effusion or pleural thickening.   UPPER ABDOMEN: No remarkable upper abdominal findings.   OSSEOUS STRUCTURES: No acute changes.       Spooner-Usman catheter tip overlies the proximal aspect of the main pulmonary trunk. Blunting of the right costophrenic angle which may relate to a trace effusion or pleural thickening.   MACRO: None.   Signed by: Sherwin Goldstein 9/24/2024 8:59 AM Dictation workstation:   [id number]    XR chest 1 view    Result Date: 9/24/2024  Interpreted By:  Nacho Serrano, STUDY: XR CHEST 1 VIEW;  9/24/2024 5:42 am   INDICATION: Signs/Symptoms:Post op cardiac surgery.     COMPARISON: 09/23/2024.   ACCESSION NUMBER(S): [id number]   ORDERING CLINICIAN: PAULA URBAN   FINDINGS: CARDIOMEDIASTINAL SILHOUETTE: Right jugular line remains in place with tubing tip at the SVC level. Cardiomegaly, aortic prominence and postoperative changes of the mediastinum are unchanged. Cephalad directed midline probable mediastinal drain remains in place. Endotracheal tube and NG tube are no longer seen.   LUNGS: Left basilar chest tube is unchanged in position. Left retrocardiac and basilar consolidation with volume loss is again seen. Small left effusion not excluded. Mild right basilar atelectasis is present. No appreciable pneumothorax.   ABDOMEN: No remarkable upper abdominal findings.   BONES: Bones are stable.       1.  Persistent consolidation and volume loss in the left  retrocardiac region and left base. Small left effusion not excluded.       MACRO: None.   Signed by: Nacho Serrano 9/24/2024 8:39 AM Dictation workstation:   [id number]    ECG 12 Lead    Result Date: 9/24/2024  Normal sinus rhythm ST elevation, consider early repolarization, pericarditis, or injury Abnormal ECG When compared with ECG of 23-SEP-2024 16:20, (unconfirmed) CA interval has decreased ST elevation now present in Lateral leads    XR chest 1 view    Result Date: 9/23/2024  Interpreted By:  Nacho Olvera, STUDY: XR CHEST 1 VIEW;  9/23/2024 4:19 pm   INDICATION: Signs/Symptoms:Post op cardiac surgery.     COMPARISON: 09/23/2024   ACCESSION NUMBER(S): [id number]   ORDERING CLINICIAN: PAULA URBAN   FINDINGS: AP radiograph of the chest was provided.   Stable right-sided Fancy Gap-Usman catheter noted. There has been interval placement of endotracheal tube noted ending 4 cm above the roldan. There is a nasogastric tube noted coursing inferior to the diaphragm in satisfactory position.   CARDIOMEDIASTINAL SILHOUETTE: Cardiomediastinal silhouette is normal in size and configuration.   LUNGS: Interval development of region of airspace consolidation noted in the left mid lung field. There is a small left-sided pleural effusion.   ABDOMEN: No remarkable upper abdominal findings.   BONES: No acute osseous changes. There are sternotomy wires noted.       1.  Stable right-sided Fancy Gap-Usman catheter noted. There has been interval placement of endotracheal tube noted ending 4 cm above the roldan. There is a nasogastric tube noted coursing inferior to the diaphragm in satisfactory position. 2. Interval development of region of airspace consolidation noted in the left mid lung field. There is a small left-sided pleural effusion.         MACRO: None   Signed by: Nacho Olvera 9/23/2024 4:25 PM Dictation workstation:   [id number]    ECG 12 Lead    Result Date: 9/23/2024  Sinus rhythm with 1st degree AV block Low voltage  QRS Borderline ECG When compared with ECG of 30-AUG-2024 11:42, T wave amplitude has decreased in Anterior leads      Results for orders placed or performed during the hospital encounter of 09/23/24 (from the past 24 hour(s))   Blood Gas Arterial Full Panel Unsolicited   Result Value Ref Range    POCT pH, Arterial 7.29 (L) 7.38 - 7.42 pH    POCT pCO2, Arterial 48 (H) 38 - 42 mm Hg    POCT pO2, Arterial 347 (H) 85 - 95 mm Hg    POCT SO2, Arterial 99 94 - 100 %    POCT Oxy Hemoglobin, Arterial 97.5 94.0 - 98.0 %    POCT Hematocrit Calculated, Arterial 44.0 41.0 - 52.0 %    POCT Sodium, Arterial 130 (L) 136 - 145 mmol/L    POCT Potassium, Arterial 5.0 3.5 - 5.3 mmol/L    POCT Chloride, Arterial 105 98 - 107 mmol/L    POCT Ionized Calcium, Arterial 1.19 1.10 - 1.33 mmol/L    POCT Glucose, Arterial 206 (H) 74 - 99 mg/dL    POCT Lactate, Arterial 1.7 0.4 - 2.0 mmol/L    POCT Base Excess, Arterial -3.8 (L) -2.0 - 3.0 mmol/L    POCT HCO3 Calculated, Arterial 23.1 22.0 - 26.0 mmol/L    POCT Hemoglobin, Arterial 14.5 13.5 - 17.5 g/dL    POCT Anion Gap, Arterial 7 (L) 10 - 25 mmo/L    Patient Temperature      FiO2 100 %   Coox Panel, Arterial Unsolicited   Result Value Ref Range    POCT Hemoglobin, Arterial 14.5 13.5 - 17.5 g/dL    POCT Oxy Hemoglobin, Arterial 97.5 94.0 - 98.0 %    POCT Carboxyhemoglobin, Arterial 0.9 %    POCT Methemoglobin, Arterial 0.8 0.0 - 1.5 %    POCT Deoxy Hemoglobin, Arterial 0.9 0.0 - 5.0 %   Blood Gas Arterial Full Panel Unsolicited   Result Value Ref Range    POCT pH, Arterial 7.38 7.38 - 7.42 pH    POCT pCO2, Arterial 47 (H) 38 - 42 mm Hg    POCT pO2, Arterial 341 (H) 85 - 95 mm Hg    POCT SO2, Arterial 99 94 - 100 %    POCT Oxy Hemoglobin, Arterial 97.6 94.0 - 98.0 %    POCT Hematocrit Calculated, Arterial 35.0 (L) 41.0 - 52.0 %    POCT Sodium, Arterial 135 (L) 136 - 145 mmol/L    POCT Potassium, Arterial 4.9 3.5 - 5.3 mmol/L    POCT Chloride, Arterial 105 98 - 107 mmol/L    POCT Ionized  Calcium, Arterial 0.94 (L) 1.10 - 1.33 mmol/L    POCT Glucose, Arterial 158 (H) 74 - 99 mg/dL    POCT Lactate, Arterial 1.3 0.4 - 2.0 mmol/L    POCT Base Excess, Arterial 2.1 -2.0 - 3.0 mmol/L    POCT HCO3 Calculated, Arterial 27.8 (H) 22.0 - 26.0 mmol/L    POCT Hemoglobin, Arterial 11.6 (L) 13.5 - 17.5 g/dL    POCT Anion Gap, Arterial 7 (L) 10 - 25 mmo/L    Patient Temperature      FiO2 80 %   Blood Gas Arterial Full Panel Unsolicited   Result Value Ref Range    POCT pH, Arterial 7.27 (L) 7.38 - 7.42 pH    POCT pCO2, Arterial 48 (H) 38 - 42 mm Hg    POCT pO2, Arterial 341 (H) 85 - 95 mm Hg    POCT SO2, Arterial 99 94 - 100 %    POCT Oxy Hemoglobin, Arterial 98.2 (H) 94.0 - 98.0 %    POCT Hematocrit Calculated, Arterial 40.0 (L) 41.0 - 52.0 %    POCT Sodium, Arterial 130 (L) 136 - 145 mmol/L    POCT Potassium, Arterial 6.0 (H) 3.5 - 5.3 mmol/L    POCT Chloride, Arterial 104 98 - 107 mmol/L    POCT Ionized Calcium, Arterial 1.07 (L) 1.10 - 1.33 mmol/L    POCT Glucose, Arterial      POCT Lactate, Arterial 2.6 (H) 0.4 - 2.0 mmol/L    POCT Base Excess, Arterial -5.1 (L) -2.0 - 3.0 mmol/L    POCT HCO3 Calculated, Arterial 22.0 22.0 - 26.0 mmol/L    POCT Hemoglobin, Arterial 13.3 (L) 13.5 - 17.5 g/dL    POCT Anion Gap, Arterial 10 10 - 25 mmo/L    Patient Temperature     Coox Panel, Arterial Unsolicited   Result Value Ref Range    POCT Hemoglobin, Arterial 13.3 (L) 13.5 - 17.5 g/dL    POCT Oxy Hemoglobin, Arterial 98.2 (H) 94.0 - 98.0 %    POCT Carboxyhemoglobin, Arterial 0.8 %    POCT Methemoglobin, Arterial 0.2 0.0 - 1.5 %    POCT Deoxy Hemoglobin, Arterial 0.8 0.0 - 5.0 %   Blood Gas Venous Full Panel Unsolicited   Result Value Ref Range    POCT pH, Venous 7.23 (LL) 7.33 - 7.43 pH    POCT pCO2, Venous 53 (H) 41 - 51 mm Hg    POCT pO2, Venous 62 (H) 35 - 45 mm Hg    POCT SO2, Venous 88 (H) 45 - 75 %    POCT Oxy Hemoglobin, Venous 86.1 (H) 45.0 - 75.0 %    POCT Hematocrit Calculated, Venous 39.0 (L) 41.0 - 52.0 %    POCT  Sodium, Venous 132 (L) 136 - 145 mmol/L    POCT Potassium, Venous 5.9 (H) 3.5 - 5.3 mmol/L    POCT Chloride, Venous 104 98 - 107 mmol/L    POCT Ionized Calicum, Venous 1.08 (L) 1.10 - 1.33 mmol/L    POCT Glucose, Venous 222 (H) 74 - 99 mg/dL    POCT Lactate, Venous 2.6 (H) 0.4 - 2.0 mmol/L    POCT Base Excess, Venous -5.8 (L) -2.0 - 3.0 mmol/L    POCT HCO3 Calculated, Venous 22.2 22.0 - 26.0 mmol/L    POCT Hemoglobin, Venous 12.9 (L) 13.5 - 17.5 g/dL    POCT Anion Gap, Venous 12.0 10.0 - 25.0 mmol/L    Patient Temperature      FiO2 80 %   Blood Gas Arterial Full Panel Unsolicited   Result Value Ref Range    POCT pH, Arterial 7.31 (L) 7.38 - 7.42 pH    POCT pCO2, Arterial 45 (H) 38 - 42 mm Hg    POCT pO2, Arterial 239 (H) 85 - 95 mm Hg    POCT SO2, Arterial 99 94 - 100 %    POCT Oxy Hemoglobin, Arterial 97.1 94.0 - 98.0 %    POCT Hematocrit Calculated, Arterial 36.0 (L) 41.0 - 52.0 %    POCT Sodium, Arterial 134 (L) 136 - 145 mmol/L    POCT Potassium, Arterial 6.3 (HH) 3.5 - 5.3 mmol/L    POCT Chloride, Arterial 110 (H) 98 - 107 mmol/L    POCT Ionized Calcium, Arterial 0.92 (L) 1.10 - 1.33 mmol/L    POCT Glucose, Arterial 183 (H) 74 - 99 mg/dL    POCT Lactate, Arterial 2.6 (H) 0.4 - 2.0 mmol/L    POCT Base Excess, Arterial -3.6 (L) -2.0 - 3.0 mmol/L    POCT HCO3 Calculated, Arterial 22.7 22.0 - 26.0 mmol/L    POCT Hemoglobin, Arterial 11.9 (L) 13.5 - 17.5 g/dL    POCT Anion Gap, Arterial 8 (L) 10 - 25 mmo/L    Patient Temperature      FiO2 80 %   Coox Panel, Arterial Unsolicited   Result Value Ref Range    POCT Hemoglobin, Arterial 11.9 (L) 13.5 - 17.5 g/dL    POCT Oxy Hemoglobin, Arterial 97.1 94.0 - 98.0 %    POCT Carboxyhemoglobin, Arterial 1.0 %    POCT Methemoglobin, Arterial 0.7 0.0 - 1.5 %    POCT Deoxy Hemoglobin, Arterial 1.2 0.0 - 5.0 %   Blood Gas Arterial Full Panel Unsolicited   Result Value Ref Range    POCT pH, Arterial 7.44 (H) 7.38 - 7.42 pH    POCT pCO2, Arterial 38 38 - 42 mm Hg    POCT pO2,  Arterial 416 (H) 85 - 95 mm Hg    POCT SO2, Arterial 99 94 - 100 %    POCT Oxy Hemoglobin, Arterial 97.2 94.0 - 98.0 %    POCT Hematocrit Calculated, Arterial 38.0 (L) 41.0 - 52.0 %    POCT Sodium, Arterial 138 136 - 145 mmol/L    POCT Potassium, Arterial 5.6 (H) 3.5 - 5.3 mmol/L    POCT Chloride, Arterial 111 (H) 98 - 107 mmol/L    POCT Ionized Calcium, Arterial 0.84 (L) 1.10 - 1.33 mmol/L    POCT Glucose, Arterial 193 (H) 74 - 99 mg/dL    POCT Lactate, Arterial 3.1 (H) 0.4 - 2.0 mmol/L    POCT Base Excess, Arterial 1.7 -2.0 - 3.0 mmol/L    POCT HCO3 Calculated, Arterial 25.8 22.0 - 26.0 mmol/L    POCT Hemoglobin, Arterial 12.7 (L) 13.5 - 17.5 g/dL    POCT Anion Gap, Arterial 7 (L) 10 - 25 mmo/L    Patient Temperature      FiO2 90 %   Coox Panel, Arterial Unsolicited   Result Value Ref Range    POCT Hemoglobin, Arterial 12.7 (L) 13.5 - 17.5 g/dL    POCT Oxy Hemoglobin, Arterial 97.2 94.0 - 98.0 %    POCT Carboxyhemoglobin, Arterial 1.0 %    POCT Methemoglobin, Arterial 0.7 0.0 - 1.5 %    POCT Deoxy Hemoglobin, Arterial 1.1 0.0 - 5.0 %   Blood Gas Arterial Full Panel Unsolicited   Result Value Ref Range    POCT pH, Arterial 7.36 (L) 7.38 - 7.42 pH    POCT pCO2, Arterial 39 38 - 42 mm Hg    POCT pO2, Arterial 305 (H) 85 - 95 mm Hg    POCT SO2, Arterial 99 94 - 100 %    POCT Oxy Hemoglobin, Arterial 97.2 94.0 - 98.0 %    POCT Hematocrit Calculated, Arterial 35.0 (L) 41.0 - 52.0 %    POCT Sodium, Arterial 138 136 - 145 mmol/L    POCT Potassium, Arterial 4.4 3.5 - 5.3 mmol/L    POCT Chloride, Arterial 113 (H) 98 - 107 mmol/L    POCT Ionized Calcium, Arterial 1.17 1.10 - 1.33 mmol/L    POCT Glucose, Arterial 159 (H) 74 - 99 mg/dL    POCT Lactate, Arterial 2.2 (H) 0.4 - 2.0 mmol/L    POCT Base Excess, Arterial -3.2 (L) -2.0 - 3.0 mmol/L    POCT HCO3 Calculated, Arterial 22.0 22.0 - 26.0 mmol/L    POCT Hemoglobin, Arterial 11.8 (L) 13.5 - 17.5 g/dL    POCT Anion Gap, Arterial 7 (L) 10 - 25 mmo/L    Patient Temperature       FiO2 100 %   Lavender Top   Result Value Ref Range    Extra Tube Hold for add-ons.    Magnesium   Result Value Ref Range    Magnesium 2.60 (H) 1.60 - 2.40 mg/dL   Coagulation Screen   Result Value Ref Range    Protime 16.1 (H) 9.8 - 12.8 seconds    INR 1.4 (H) 0.9 - 1.1    aPTT 28 27 - 38 seconds   Fibrinogen   Result Value Ref Range    Fibrinogen 190 (L) 200 - 400 mg/dL   CBC   Result Value Ref Range    WBC 17.9 (H) 4.4 - 11.3 x10*3/uL    nRBC 0.0 0.0 - 0.0 /100 WBCs    RBC 4.38 (L) 4.50 - 5.90 x10*6/uL    Hemoglobin 12.7 (L) 13.5 - 17.5 g/dL    Hematocrit 35.7 (L) 41.0 - 52.0 %    MCV 82 80 - 100 fL    MCH 29.0 26.0 - 34.0 pg    MCHC 35.6 32.0 - 36.0 g/dL    RDW 14.1 11.5 - 14.5 %    Platelets 140 (L) 150 - 450 x10*3/uL   Renal Function Panel   Result Value Ref Range    Glucose 112 (H) 74 - 99 mg/dL    Sodium 144 136 - 145 mmol/L    Potassium 3.6 3.5 - 5.3 mmol/L    Chloride 113 (H) 98 - 107 mmol/L    Bicarbonate 23 21 - 32 mmol/L    Anion Gap 12 10 - 20 mmol/L    Urea Nitrogen 14 6 - 23 mg/dL    Creatinine 0.92 0.50 - 1.30 mg/dL    eGFR >90 >60 mL/min/1.73m*2    Calcium 7.6 (L) 8.6 - 10.3 mg/dL    Phosphorus 1.8 (L) 2.5 - 4.9 mg/dL    Albumin 3.7 3.4 - 5.0 g/dL   Platelet count   Result Value Ref Range    Platelets 140 (L) 150 - 450 x10*3/uL   POCT GLUCOSE   Result Value Ref Range    POCT Glucose 101 (H) 74 - 99 mg/dL   BLOOD GAS ARTERIAL FULL PANEL   Result Value Ref Range    POCT pH, Arterial 7.38 7.38 - 7.42 pH    POCT pCO2, Arterial 41 38 - 42 mm Hg    POCT pO2, Arterial 71 (L) 85 - 95 mm Hg    POCT SO2, Arterial 96 94 - 100 %    POCT Oxy Hemoglobin, Arterial 94.2 94.0 - 98.0 %    POCT Hematocrit Calculated, Arterial 39.0 (L) 41.0 - 52.0 %    POCT Sodium, Arterial 141 136 - 145 mmol/L    POCT Potassium, Arterial 3.6 3.5 - 5.3 mmol/L    POCT Chloride, Arterial 110 (H) 98 - 107 mmol/L    POCT Ionized Calcium, Arterial 1.08 (L) 1.10 - 1.33 mmol/L    POCT Glucose, Arterial 107 (H) 74 - 99 mg/dL    POCT Lactate,  Arterial 2.4 (H) 0.4 - 2.0 mmol/L    POCT Base Excess, Arterial -0.8 -2.0 - 3.0 mmol/L    POCT HCO3 Calculated, Arterial 24.3 22.0 - 26.0 mmol/L    POCT Hemoglobin, Arterial 13.1 (L) 13.5 - 17.5 g/dL    POCT Anion Gap, Arterial 10 10 - 25 mmo/L    Patient Temperature      FiO2 50 %    Ventilator Mode A/C     Ventilator Rate 16 bpm    Tidal Volume 550 mL    Peep CHM2O 5.0 cm H2O    Site of Arterial Puncture Arterial Line    ECG 12 Lead   Result Value Ref Range    Ventricular Rate 100 BPM    Atrial Rate 100 BPM    AL Interval 222 ms    QRS Duration 84 ms    QT Interval 344 ms    QTC Calculation(Bazett) 443 ms    P Axis 67 degrees    R Axis -1 degrees    T Axis 24 degrees    QRS Count 17 beats    Q Onset 211 ms    P Onset 100 ms    P Offset 158 ms    T Offset 383 ms    QTC Fredericia 408 ms   POCT GLUCOSE   Result Value Ref Range    POCT Glucose 105 (H) 74 - 99 mg/dL   POCT GLUCOSE   Result Value Ref Range    POCT Glucose 164 (H) 74 - 99 mg/dL   POCT GLUCOSE   Result Value Ref Range    POCT Glucose 147 (H) 74 - 99 mg/dL   Calcium, Ionized   Result Value Ref Range    POCT Calcium, Ionized 1.13 1.1 - 1.33 mmol/L   Magnesium   Result Value Ref Range    Magnesium 2.11 1.60 - 2.40 mg/dL   CBC   Result Value Ref Range    WBC 7.7 4.4 - 11.3 x10*3/uL    nRBC 0.0 0.0 - 0.0 /100 WBCs    RBC 3.76 (L) 4.50 - 5.90 x10*6/uL    Hemoglobin 10.7 (L) 13.5 - 17.5 g/dL    Hematocrit 31.1 (L) 41.0 - 52.0 %    MCV 83 80 - 100 fL    MCH 28.5 26.0 - 34.0 pg    MCHC 34.4 32.0 - 36.0 g/dL    RDW 14.6 (H) 11.5 - 14.5 %    Platelets 84 (L) 150 - 450 x10*3/uL   Renal Function Panel   Result Value Ref Range    Glucose 151 (H) 74 - 99 mg/dL    Sodium 138 136 - 145 mmol/L    Potassium 4.1 3.5 - 5.3 mmol/L    Chloride 108 (H) 98 - 107 mmol/L    Bicarbonate 24 21 - 32 mmol/L    Anion Gap 10 10 - 20 mmol/L    Urea Nitrogen 15 6 - 23 mg/dL    Creatinine 0.87 0.50 - 1.30 mg/dL    eGFR >90 >60 mL/min/1.73m*2    Calcium 8.5 (L) 8.6 - 10.3 mg/dL     Phosphorus 4.0 2.5 - 4.9 mg/dL    Albumin 3.9 3.4 - 5.0 g/dL   POCT GLUCOSE   Result Value Ref Range    POCT Glucose 133 (H) 74 - 99 mg/dL   POCT GLUCOSE   Result Value Ref Range    POCT Glucose 154 (H) 74 - 99 mg/dL   ECG 12 Lead   Result Value Ref Range    Ventricular Rate 91 BPM    Atrial Rate 91 BPM    MA Interval 186 ms    QRS Duration 84 ms    QT Interval 344 ms    QTC Calculation(Bazett) 423 ms    P Axis 62 degrees    R Axis 15 degrees    T Axis 9 degrees    QRS Count 15 beats    Q Onset 223 ms    P Onset 130 ms    P Offset 182 ms    T Offset 395 ms    QTC Fredericia 395 ms               Assessment/Plan   Assessment & Plan  Coronary artery disease of native artery of native heart with stable angina pectoris    Chest pain, unspecified type    Angina pectoris, unstable (Multi)    Coronary artery disease; status post CABG  Status post CABG x 3 with LIMA to LAD, radial graft to OM, and saphenous vein graft to PDA on 9/23.  -ICU following appreciate assistance  -Continue daily low-dose aspirin, statin  -Remove arterial line, PA catheter, Harris catheter, and mediastinal/pleural chest tubes if output remains low after ambulating  -Metoprolol 25 mg twice daily  -Initiate diuresis with Lasix 20 mg IV x 1 further recommendations based on response  -Replete electrolytes to maintain potassium greater than 4.0, magnesium greater than 2.0  -DC IV fluids  -DVT prophylaxis with PADMA hose and SCDs, no chemoprophylaxis due to thrombocytopenia  -Clear liquid diet, advance to cardiac diet as tolerated  -Bowel regimen with MiraLAX and Colace  -Prophylaxis with Protonix  -Tylenol and oxycodone for pain tramadol 50 mg every 6 hours as needed added as alternative to narcotics  -Incentive spirometry, out of bed 3 times daily  -PT/OT following    Anemia; thrombocytopenia  Acute postoperative blood loss anemia secondary to hemodilution and consumption, chronic thrombocytopenia with baseline platelets ranging 60-80.  POD #1: Hemoglobin  10.7, platelet count 84K, no signs of active bleeding  -Follow CBC    Cirrhosis  Chronic hepatic cirrhosis, LFTs stable       I spent 35 minutes in the professional and overall care of this patient.      Radha Luciano, NAVARRO-CNP

## 2024-09-24 NOTE — PROGRESS NOTES
Physical Therapy    Physical Therapy Evaluation    Patient Name: Yuri Reynolds  MRN: [id number]  Today's Date: 9/24/2024   Time Calculation  Start Time: 1020  Stop Time: 1040  Time Calculation (min): 20 min  09/09-A    Assessment/Plan   PT Assessment  PT Assessment Results: Decreased strength, Decreased endurance, Impaired balance, Decreased mobility, Decreased coordination, Pain, Impaired hearing  Rehab Prognosis: Good  Evaluation/Treatment Tolerance: Patient limited by fatigue, Patient limited by pain  Barriers to Participation: Comorbidities (endurance)  End of Session Communication: Bedside nurse  Assessment Comment: pt with decreased mobility/gait strength balance endurance pt to benefit from skilled PT to address deficits and improve functional mobility.  End of Session Patient Position: Bed, 3 rail up, Alarm off, not on at start of session (RN present .)  IP OR SWING BED PT PLAN  Inpatient or Swing Bed: Inpatient  PT Plan  Treatment/Interventions: Bed mobility, Transfer training, Gait training, Stair training, Balance training, Strengthening, Endurance training, Therapeutic exercise, Therapeutic activity  PT Plan: Ongoing PT  PT Frequency: 4 times per week  PT Discharge Recommendations: Low intensity level of continued care  Equipment Recommended upon Discharge: Wheeled walker, Straight cane  PT Recommended Transfer Status: Assist x1  PT - OK to Discharge: Yes (once medically ready for discharge to next level of care.)    Subjective     Current Problem:  1. Chest pain, unspecified type  Anesthesia Intraoperative Transesophageal Echocardiogram    Anesthesia Intraoperative Transesophageal Echocardiogram        Patient Active Problem List   Diagnosis    Abnormal LFTs    Coronary artery disease involving native coronary artery of native heart with angina pectoris (CMS-HCC)    Bilateral sensorineural hearing loss    Chronic nasal congestion    Cirrhosis (Multi)    Cough    Dizziness    Elevated serum creatinine     Eustachian tube dysfunction    Fatty liver    Hearing loss, bilateral    History of PTCA    Hyperlipidemia    Hypersplenism    Hypertension    Inability to maintain erection    Insomnia    Memory changes    Nicotine dependence    Palpitations    Past myocardial infarction    Retained myringotomy tube    Small bowel obstruction (Multi)    Somnolence, daytime    Thrombocytopenia (CMS-HCC)    Type 2 diabetes mellitus without complication, without long-term current use of insulin (Multi)    History of alcohol abuse    Class 1 obesity with serious comorbidity and body mass index (BMI) of 33.0 to 33.9 in adult    Abnormal finding in urine    Alcohol abuse    Anal discharge    Bursitis of elbow    Difficulty breathing    Dysfunction of both eustachian tubes    History of cardiac catheterization    History of ear disorder    History of pulmonary embolism    Inflammatory dermatosis    Lateral epicondylitis of both elbows    Portal hypertension (Multi)    Tinnitus, right ear    Pulmonary embolism without acute cor pulmonale, unspecified chronicity, unspecified pulmonary embolism type (Multi)    Chest pain    Angina pectoris, unstable (Multi)    Coronary artery disease of native artery of native heart with stable angina pectoris (CMS-HCC)    Chest pain, unspecified type       General Visit Information:  General  Reason for Referral: S/P CABG x 3  Referred By: OT/PT Emmett 9/23  Past Medical History Relevant to Rehab: HLD, HTN,MI,CAD,Dm2,ETOH abuse,SBO,Cirrhosis,[place],PE  Prior to Session Communication: Bedside nurse (cleared to see for therapy eval.)  Patient Position Received: Up in chair, Alarm off, not on at start of session (RN present .  pt has tele, 02 , chest tubes x2 , ramos, cardiac pillow.)  General Comment: pt is a 63 yo male came in on 9/23 for elective CABGx3. test/labs :  hgb 10.7, CXR : 9/24 perisistent consolidation and vol loss L base.  small L effusion.  Home Living:  Home Living  Home Living Comments: pt  lives at home with his wife in a split level home. reports 5 steps down with rail into home from the garage.  pts bath on main level with walk in shower no equipment. bedroom is up 2 sets of 6 steps with rails.    Prior Level of Function:  Prior Function Per Pt/Caregiver Report  Prior Function Comments: Per pt IND with mobility adls and assistance from wife with iadls. 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  Precautions:  Precautions  Hearing/Visual Limitations: [place]  LE Weight Bearing Status: Weight Bearing as Tolerated  Medical Precautions: Fall precautions, Oxygen therapy device and L/min, Chest tube  Post-Surgical Precautions: Move in the Tube    Vital Signs:  Vital Signs  Heart Rate: 97  SpO2: 95 %  BP: 144/56  Objective     Pain:  Pain Assessment  Pain Assessment: 0-10  0-10 (Numeric) Pain Score: 5 - Moderate pain  Pain Type: Acute pain, Surgical pain  Pain Location: Chest    Cognition:  Cognition  Overall Cognitive Status: Within Functional Limits  Orientation Level: Oriented X4  General Assessments:  Activity Tolerance  Endurance: Tolerates less than 10 min exercise with changes in vital signs  Sensation  Sensation Comment: intact BLEs.  Strength  Strength Comments: BLE 4/5     Coordination  Movements are Fluid and Coordinated: Yes     Static Sitting Balance  Static Sitting-Comment/Number of Minutes: fair  Dynamic Sitting Balance  Dynamic Sitting-Comments: fair  Static Standing Balance  Static Standing-Comment/Number of Minutes: fair  Dynamic Standing Balance  Dynamic Standing-Comments: fair -    Functional Assessments:  Bed Mobility  Bed Mobility: Yes  Bed Mobility 1  Bed Mobility 1: Sitting to supine, Log roll  Level of Assistance 1: Moderate assistance  Bed Mobility Comments 1: mod A back into bed min A with log roll directions.  Transfers  Transfer: Yes  Transfer 1  Technique 1: Sit to stand, Stand to sit  Transfer Device 1: Cardio-thoracic walker (Living good  walker)  Transfer Level of Assistance 1: Minimum assistance  Trials/Comments 1: Jaime with cues to hold pillow and push up with legs.  Ambulation/Gait Training  Ambulation/Gait Training Performed: Yes  Ambulation/Gait Training 1  Surface 1: Level tile  Device 1:  (living good walker)  Assistance 1: Minimum assistance  Quality of Gait 1: Inconsistent stride length, Decreased step length (slow gait speed.)  Comments/Distance (ft) 1: 50ft x 2 with living good  with standing rest breaks . min cues for purse lip breathing  and cues to stand up tall.  Extremity/Trunk Assessments:  RLE   RLE : Within Functional Limits  LLE   LLE : Within Functional Limits  Outcome Measures:     [facility] Basic Mobility  Turning from your back to your side while in a flat bed without using bedrails: A lot  Moving from lying on your back to sitting on the side of a flat bed without using bedrails: A lot  Moving to and from bed to chair (including a wheelchair): A little  Standing up from a chair using your arms (e.g. wheelchair or bedside chair): A little  To walk in hospital room: A little  Climbing 3-5 steps with railing: Total  Basic Mobility - Total Score: 14  FSS-ICU  Ambulation: Walks >/ or equal to 50 feet with any assistance x1  Rolling: Moderate assistance (performs 50 - 74% of task)  Sitting: Minimal assistance (performs 75% or more of task)  Transfer Sit-to-Stand: Minimal assistance (performs 75% or more of task)  Transfer Supine-to-Sit: Moderate assistance (performs 50 - 74% of task)  Total Score: 16  Goals:  Encounter Problems       Encounter Problems (Active)       PT Problem       Pt will demonstrate mod I  with bed mobility to edge of bed.         Start:  09/24/24    Expected End:  10/08/24            Pt will demonstrate mod I with sit to stand/chair transfers with least restrictive device.         Start:  09/24/24    Expected End:  10/08/24            Pt will ambulate 150 feet with mod I  least restrictive device . '       Start:   09/24/24    Expected End:  10/08/24            Pt to demo 12 steps with  rails with SBA        Start:  09/24/24    Expected End:  10/08/24               Pain - Adult            Education Documentation  Precautions, taught by Osito Fontenot, PT at 9/24/2024 12:39 PM.  Learner: Patient  Readiness: Acceptance  Method: Explanation  Response: Verbalizes Understanding  Comment: MITT precautions    Mobility Training, taught by Osito Fontenot, PT at 9/24/2024 12:39 PM.  Learner: Patient  Readiness: Acceptance  Method: Explanation  Response: Verbalizes Understanding  Comment: MITT precautions    Education Comments  No comments found.

## 2024-09-25 ENCOUNTER — APPOINTMENT (OUTPATIENT)
Dept: RADIOLOGY | Facility: HOSPITAL | Age: 64
DRG: 236 | End: 2024-09-25
Payer: COMMERCIAL

## 2024-09-25 LAB
ALBUMIN SERPL BCP-MCNC: 3.6 G/DL (ref 3.4–5)
ANION GAP SERPL CALC-SCNC: 8 MMOL/L (ref 10–20)
BUN SERPL-MCNC: 18 MG/DL (ref 6–23)
CA-I BLD-SCNC: 1.22 MMOL/L (ref 1.1–1.33)
CALCIUM SERPL-MCNC: 8.6 MG/DL (ref 8.6–10.3)
CHLORIDE SERPL-SCNC: 102 MMOL/L (ref 98–107)
CO2 SERPL-SCNC: 28 MMOL/L (ref 21–32)
CREAT SERPL-MCNC: 0.79 MG/DL (ref 0.5–1.3)
EGFRCR SERPLBLD CKD-EPI 2021: >90 ML/MIN/1.73M*2
ERYTHROCYTE [DISTWIDTH] IN BLOOD BY AUTOMATED COUNT: 14.6 % (ref 11.5–14.5)
GLUCOSE BLD MANUAL STRIP-MCNC: 114 MG/DL (ref 74–99)
GLUCOSE BLD MANUAL STRIP-MCNC: 118 MG/DL (ref 74–99)
GLUCOSE BLD MANUAL STRIP-MCNC: 120 MG/DL (ref 74–99)
GLUCOSE BLD MANUAL STRIP-MCNC: 132 MG/DL (ref 74–99)
GLUCOSE BLD MANUAL STRIP-MCNC: 143 MG/DL (ref 74–99)
GLUCOSE BLD MANUAL STRIP-MCNC: 144 MG/DL (ref 74–99)
GLUCOSE SERPL-MCNC: 116 MG/DL (ref 74–99)
HCT VFR BLD AUTO: 29.3 % (ref 41–52)
HGB BLD-MCNC: 9.9 G/DL (ref 13.5–17.5)
MAGNESIUM SERPL-MCNC: 2 MG/DL (ref 1.6–2.4)
MCH RBC QN AUTO: 28.4 PG (ref 26–34)
MCHC RBC AUTO-ENTMCNC: 33.8 G/DL (ref 32–36)
MCV RBC AUTO: 84 FL (ref 80–100)
NRBC BLD-RTO: 0 /100 WBCS (ref 0–0)
PHOSPHATE SERPL-MCNC: 2.1 MG/DL (ref 2.5–4.9)
PLATELET # BLD AUTO: 52 X10*3/UL (ref 150–450)
POTASSIUM SERPL-SCNC: 3.7 MMOL/L (ref 3.5–5.3)
RBC # BLD AUTO: 3.48 X10*6/UL (ref 4.5–5.9)
SODIUM SERPL-SCNC: 134 MMOL/L (ref 136–145)
WBC # BLD AUTO: 6.8 X10*3/UL (ref 4.4–11.3)

## 2024-09-25 PROCEDURE — 2500000004 HC RX 250 GENERAL PHARMACY W/ HCPCS (ALT 636 FOR OP/ED): Performed by: NURSE PRACTITIONER

## 2024-09-25 PROCEDURE — 2500000004 HC RX 250 GENERAL PHARMACY W/ HCPCS (ALT 636 FOR OP/ED): Performed by: STUDENT IN AN ORGANIZED HEALTH CARE EDUCATION/TRAINING PROGRAM

## 2024-09-25 PROCEDURE — 85027 COMPLETE CBC AUTOMATED: CPT

## 2024-09-25 PROCEDURE — 2500000001 HC RX 250 WO HCPCS SELF ADMINISTERED DRUGS (ALT 637 FOR MEDICARE OP): Performed by: NURSE PRACTITIONER

## 2024-09-25 PROCEDURE — 74018 RADEX ABDOMEN 1 VIEW: CPT

## 2024-09-25 PROCEDURE — 37799 UNLISTED PX VASCULAR SURGERY: CPT

## 2024-09-25 PROCEDURE — 99233 SBSQ HOSP IP/OBS HIGH 50: CPT | Performed by: NURSE PRACTITIONER

## 2024-09-25 PROCEDURE — 82947 ASSAY GLUCOSE BLOOD QUANT: CPT

## 2024-09-25 PROCEDURE — 2500000001 HC RX 250 WO HCPCS SELF ADMINISTERED DRUGS (ALT 637 FOR MEDICARE OP)

## 2024-09-25 PROCEDURE — 2500000002 HC RX 250 W HCPCS SELF ADMINISTERED DRUGS (ALT 637 FOR MEDICARE OP, ALT 636 FOR OP/ED): Performed by: STUDENT IN AN ORGANIZED HEALTH CARE EDUCATION/TRAINING PROGRAM

## 2024-09-25 PROCEDURE — 97116 GAIT TRAINING THERAPY: CPT | Mod: GP,CQ

## 2024-09-25 PROCEDURE — 97530 THERAPEUTIC ACTIVITIES: CPT | Mod: GP,CQ

## 2024-09-25 PROCEDURE — 2500000002 HC RX 250 W HCPCS SELF ADMINISTERED DRUGS (ALT 637 FOR MEDICARE OP, ALT 636 FOR OP/ED)

## 2024-09-25 PROCEDURE — 82330 ASSAY OF CALCIUM: CPT

## 2024-09-25 PROCEDURE — 2060000001 HC INTERMEDIATE ICU ROOM DAILY

## 2024-09-25 PROCEDURE — 99233 SBSQ HOSP IP/OBS HIGH 50: CPT | Performed by: SURGERY

## 2024-09-25 PROCEDURE — 83735 ASSAY OF MAGNESIUM: CPT

## 2024-09-25 PROCEDURE — 2500000005 HC RX 250 GENERAL PHARMACY W/O HCPCS

## 2024-09-25 PROCEDURE — 74018 RADEX ABDOMEN 1 VIEW: CPT | Performed by: RADIOLOGY

## 2024-09-25 PROCEDURE — 80069 RENAL FUNCTION PANEL: CPT

## 2024-09-25 PROCEDURE — 99233 SBSQ HOSP IP/OBS HIGH 50: CPT | Performed by: INTERNAL MEDICINE

## 2024-09-25 PROCEDURE — 2500000005 HC RX 250 GENERAL PHARMACY W/O HCPCS: Performed by: STUDENT IN AN ORGANIZED HEALTH CARE EDUCATION/TRAINING PROGRAM

## 2024-09-25 RX ORDER — METOCLOPRAMIDE HYDROCHLORIDE 5 MG/ML
10 INJECTION INTRAMUSCULAR; INTRAVENOUS EVERY 6 HOURS SCHEDULED
Status: DISCONTINUED | OUTPATIENT
Start: 2024-09-25 | End: 2024-09-25

## 2024-09-25 RX ORDER — METOCLOPRAMIDE HYDROCHLORIDE 5 MG/ML
10 INJECTION INTRAMUSCULAR; INTRAVENOUS EVERY 6 HOURS SCHEDULED
Status: DISCONTINUED | OUTPATIENT
Start: 2024-09-25 | End: 2024-09-26 | Stop reason: ALTCHOICE

## 2024-09-25 RX ORDER — KETOROLAC TROMETHAMINE 30 MG/ML
15 INJECTION, SOLUTION INTRAMUSCULAR; INTRAVENOUS ONCE
Status: COMPLETED | OUTPATIENT
Start: 2024-09-25 | End: 2024-09-25

## 2024-09-25 RX ORDER — POTASSIUM CHLORIDE 20 MEQ/1
40 TABLET, EXTENDED RELEASE ORAL ONCE
Status: COMPLETED | OUTPATIENT
Start: 2024-09-25 | End: 2024-09-25

## 2024-09-25 RX ORDER — FUROSEMIDE 10 MG/ML
20 INJECTION INTRAMUSCULAR; INTRAVENOUS ONCE
Status: COMPLETED | OUTPATIENT
Start: 2024-09-25 | End: 2024-09-25

## 2024-09-25 RX ORDER — SIMETHICONE 80 MG
80 TABLET,CHEWABLE ORAL 4 TIMES DAILY
Status: DISCONTINUED | OUTPATIENT
Start: 2024-09-25 | End: 2024-09-28 | Stop reason: HOSPADM

## 2024-09-25 SDOH — SOCIAL STABILITY: SOCIAL INSECURITY: WITHIN THE LAST YEAR, HAVE YOU BEEN HUMILIATED OR EMOTIONALLY ABUSED IN OTHER WAYS BY YOUR PARTNER OR EX-PARTNER?: NO

## 2024-09-25 SDOH — ECONOMIC STABILITY: TRANSPORTATION INSECURITY
IN THE PAST 12 MONTHS, HAS THE LACK OF TRANSPORTATION KEPT YOU FROM MEDICAL APPOINTMENTS OR FROM GETTING MEDICATIONS?: NO

## 2024-09-25 SDOH — HEALTH STABILITY: PHYSICAL HEALTH: ON AVERAGE, HOW MANY MINUTES DO YOU ENGAGE IN EXERCISE AT THIS LEVEL?: 0 MIN

## 2024-09-25 SDOH — ECONOMIC STABILITY: HOUSING INSECURITY: IN THE PAST 12 MONTHS, HOW MANY TIMES HAVE YOU MOVED WHERE YOU WERE LIVING?: 0

## 2024-09-25 SDOH — ECONOMIC STABILITY: INCOME INSECURITY: IN THE LAST 12 MONTHS, WAS THERE A TIME WHEN YOU WERE NOT ABLE TO PAY THE MORTGAGE OR RENT ON TIME?: NO

## 2024-09-25 SDOH — ECONOMIC STABILITY: FOOD INSECURITY: WITHIN THE PAST 12 MONTHS, THE FOOD YOU BOUGHT JUST DIDN'T LAST AND YOU DIDN'T HAVE MONEY TO GET MORE.: NEVER TRUE

## 2024-09-25 SDOH — ECONOMIC STABILITY: FOOD INSECURITY: WITHIN THE PAST 12 MONTHS, YOU WORRIED THAT YOUR FOOD WOULD RUN OUT BEFORE YOU GOT MONEY TO BUY MORE.: NEVER TRUE

## 2024-09-25 SDOH — SOCIAL STABILITY: SOCIAL INSECURITY: WITHIN THE LAST YEAR, HAVE YOU BEEN AFRAID OF YOUR PARTNER OR EX-PARTNER?: NO

## 2024-09-25 SDOH — ECONOMIC STABILITY: INCOME INSECURITY: IN THE PAST 12 MONTHS, HAS THE ELECTRIC, GAS, OIL, OR WATER COMPANY THREATENED TO SHUT OFF SERVICE IN YOUR HOME?: NO

## 2024-09-25 SDOH — SOCIAL STABILITY: SOCIAL INSECURITY
WITHIN THE LAST YEAR, HAVE YOU BEEN KICKED, HIT, SLAPPED, OR OTHERWISE PHYSICALLY HURT BY YOUR PARTNER OR EX-PARTNER?: NO

## 2024-09-25 SDOH — ECONOMIC STABILITY: HOUSING INSECURITY: AT ANY TIME IN THE PAST 12 MONTHS, WERE YOU HOMELESS OR LIVING IN A SHELTER (INCLUDING NOW)?: NO

## 2024-09-25 SDOH — HEALTH STABILITY: PHYSICAL HEALTH: ON AVERAGE, HOW MANY DAYS PER WEEK DO YOU ENGAGE IN MODERATE TO STRENUOUS EXERCISE (LIKE A BRISK WALK)?: 0 DAYS

## 2024-09-25 SDOH — ECONOMIC STABILITY: INCOME INSECURITY: HOW HARD IS IT FOR YOU TO PAY FOR THE VERY BASICS LIKE FOOD, HOUSING, MEDICAL CARE, AND HEATING?: NOT HARD AT ALL

## 2024-09-25 SDOH — ECONOMIC STABILITY: TRANSPORTATION INSECURITY
IN THE PAST 12 MONTHS, HAS LACK OF TRANSPORTATION KEPT YOU FROM MEETINGS, WORK, OR FROM GETTING THINGS NEEDED FOR DAILY LIVING?: NO

## 2024-09-25 SDOH — SOCIAL STABILITY: SOCIAL INSECURITY
WITHIN THE LAST YEAR, HAVE TO BEEN RAPED OR FORCED TO HAVE ANY KIND OF SEXUAL ACTIVITY BY YOUR PARTNER OR EX-PARTNER?: NO

## 2024-09-25 ASSESSMENT — COGNITIVE AND FUNCTIONAL STATUS - GENERAL
TURNING FROM BACK TO SIDE WHILE IN FLAT BAD: A LITTLE
DAILY ACTIVITIY SCORE: 20
DRESSING REGULAR LOWER BODY CLOTHING: A LITTLE
TURNING FROM BACK TO SIDE WHILE IN FLAT BAD: A LITTLE
MOVING TO AND FROM BED TO CHAIR: A LITTLE
MOVING FROM LYING ON BACK TO SITTING ON SIDE OF FLAT BED WITH BEDRAILS: A LITTLE
HELP NEEDED FOR BATHING: A LITTLE
CLIMB 3 TO 5 STEPS WITH RAILING: A LITTLE
DRESSING REGULAR UPPER BODY CLOTHING: A LITTLE
MOBILITY SCORE: 19
MOVING TO AND FROM BED TO CHAIR: A LITTLE
WALKING IN HOSPITAL ROOM: A LITTLE
MOBILITY SCORE: 19
MOVING FROM LYING ON BACK TO SITTING ON SIDE OF FLAT BED WITH BEDRAILS: A LITTLE
CLIMB 3 TO 5 STEPS WITH RAILING: TOTAL
WALKING IN HOSPITAL ROOM: A LITTLE
WALKING IN HOSPITAL ROOM: A LITTLE
DRESSING REGULAR UPPER BODY CLOTHING: A LITTLE
WALKING IN HOSPITAL ROOM: A LITTLE
MOVING FROM LYING ON BACK TO SITTING ON SIDE OF FLAT BED WITH BEDRAILS: A LITTLE
MOVING TO AND FROM BED TO CHAIR: A LITTLE
HELP NEEDED FOR BATHING: A LITTLE
CLIMB 3 TO 5 STEPS WITH RAILING: A LITTLE
TURNING FROM BACK TO SIDE WHILE IN FLAT BAD: A LITTLE
MOVING TO AND FROM BED TO CHAIR: A LITTLE
STANDING UP FROM CHAIR USING ARMS: A LITTLE
MOBILITY SCORE: 19
DAILY ACTIVITIY SCORE: 20
DRESSING REGULAR LOWER BODY CLOTHING: A LITTLE
MOVING FROM LYING ON BACK TO SITTING ON SIDE OF FLAT BED WITH BEDRAILS: A LITTLE
TURNING FROM BACK TO SIDE WHILE IN FLAT BAD: A LITTLE
CLIMB 3 TO 5 STEPS WITH RAILING: A LITTLE
TOILETING: A LITTLE
TOILETING: A LITTLE
MOBILITY SCORE: 16

## 2024-09-25 ASSESSMENT — PAIN SCALES - GENERAL
PAINLEVEL_OUTOF10: 5 - MODERATE PAIN
PAINLEVEL_OUTOF10: 5 - MODERATE PAIN
PAINLEVEL_OUTOF10: 4
PAINLEVEL_OUTOF10: 2

## 2024-09-25 ASSESSMENT — PAIN DESCRIPTION - LOCATION
LOCATION: CHEST
LOCATION: CHEST

## 2024-09-25 ASSESSMENT — PAIN - FUNCTIONAL ASSESSMENT: PAIN_FUNCTIONAL_ASSESSMENT: 0-10

## 2024-09-25 NOTE — PROGRESS NOTES
09/25/24 1020   Discharge Planning   Home or Post Acute Services In home services   Type of Home Care Services Home nursing visits;Home OT;Home PT   Expected Discharge Disposition Home H  ([facility])     Pt is s/p POD #2 for elective CABG x3 (LIMA-LAD, RA-OM, SVG-PDA) with Dr Gan. Pt currently on supplemental O2 @ 3 LPM NC. Chest tubes x2. AMPAC scores are PT (16) OT (16) recommending continued therapy at low level/intensity. Pt discharge preference is home with [facility] and agency of choice is [facility]. Demographics verified, best phone # [phone]. CNP notified of pt [facility] agency of choice. CT team will continue monitoring case for progression and DC planning.

## 2024-09-25 NOTE — NURSING NOTE
04:25 Patient requesting to get up to recliner chair. Patient stood easily, and transferred to chair well.  06:15 Patient requesting to get back into bed. Educated patient about importance of being out of bed to prevent pna and to promote recovery. Patient verbalizes understanding but still insists on getting back to bed. Agrees he will get back into chair for breakfast and get up for a walk at that time.

## 2024-09-25 NOTE — PROGRESS NOTES
[facility] Critical Care Medicine       Date:  9/25/2024  Patient:  Yuri Reynolds  YOB: 1960  MRN:  [id number]   Admit Date:  9/23/2024  ========================================================================================================    No chief complaint on file.        History of Present Illness:  Yuri Reynolds is a 64 y.o. year old male patient with Past Medical History of HTN, HLD, CAD with remote hx of MI (1999, 2003) s/p PCI, former nicotine dependence (quit 2017), former alcohol abuse associated with alcoholic cirrhosis (quit drinking in 2019), chronic thrombocytopenia 2/2 cirrhosis, and recently diagnosed PE on Eliquis. He has been having symptoms of exertional chest pain associated with dyspnea since April which have continued to progress where activity is now limited. He was admitted to [facility] in July with chest pain and SOB; CTA at that time suspicious for PE and patient started on Eliquis. Echo at that time revealed normal LV function with EF 55-60%, mild concentric LVH, no RWMA's, and trivial to 1+ tricuspid regurgitation with RVSP 23mmHg. He was evaluated by Cardiology this admission who felt ischemic eval could be done electively. Pt discharged to home on Eliquis on 7/22. He underwent Lexiscan perfusion study 8/2 revealing a moderate inferoapical myocardial infarction and angiogram advised. He presented to [facility] electively today and underwent coronary angiogram under the care of Dr. Moser. This revealed severe triple vessel disease including  of Cx and surgical revascularization advised. CTS consulted for further recommendations.      On 9/03, patient was seen in office by Dr. Gan and scheduled for CABG on 9/23. Patient underwent CABG x 3 (LIMA-LAD, RA-OM, SVG-PDA) with Dr. Gan and arrived in the ICU at 1537.       Intra-Op Details:     Pump Time: 112 mins      Cross Clamp Time: 91     EBL: 500 cc     Crystalloids: 2.5 L      Colloids: plasma post op      Blood:  none     Cell Saver: 470     UOP: 1200     Chest Tubes: 2 - Left pleural and mediastinal      Pacing Wires: ventricular      Intra-op Complications: pt was hypertensive at the beginning of the case & began oozing from catheter sites. This resolved once blood pressure was lowered.      Interval ICU Events:  9/23: Patient admitted to ICU s/p CABG x 3 (LIMA-LAD, RA-OM, SVG-PDA). Intubated and mechanically ventilated. Vent settings include AC mode, PEEP 5, , and RR 16. Hemodynamics on arrival to unit include hypotension requiring .02 levophed. Will resuscitate and stabilize. Plan for Pressure Support trials and extubation as able this afternoon.    9/24: extubated; patient began on colchicine, metoprolol, and lasix; no longer requiring norepinephrine for BP support; swan taken out overnight; remove chest tubes, art line, and ramos; work with PT/OT; advance to cardiac diet as able      9/25:  Overall doing well, a bit distended this AM, otherwise doing well    Medical History:  Past Medical History:   Diagnosis Date    Alcohol abuse, uncomplicated 12/20/2022    Alcohol abuse    Cirrhosis (Multi)     Coronary artery disease     COVID-19     VACCINATED    Dizziness     Hyperlipidemia     Hypertension     Insomnia     Old myocardial infarction     History of myocardial infarction    Other specified postprocedural states     History of cardiac cath    Palpitations     Personal history of other diseases of the nervous system and sense organs     History of eustachian tube dysfunction    Personal history of pulmonary embolism     History of pulmonary embolism    Small bowel obstruction (Multi)     Thrombocytopenia (CMS-HCC)     Type 2 diabetes mellitus (Multi)      Past Surgical History:   Procedure Laterality Date    CARDIAC CATHETERIZATION N/A 8/30/2024    Procedure: Left Heart Cath, With LV;  Surgeon: Db Moser MD;  Location: [facility];  Service: Cardiovascular;  Laterality: N/A;  left heart  cath at [facility] [place] with Dr. Casillas or Dr. Moser this week. Pt to hold Eliquis and Metforming 48 hours prior. He was advised ot increase his Isosorbide to120 mg daily.Non-fasitng labs for BMP and CBC to be done prior.    OTHER SURGICAL HISTORY  10/20/2021    Percutaneous transluminal coronary angioplasty     Medications Prior to Admission   Medication Sig Dispense Refill Last Dose    albuterol 90 mcg/actuation inhaler Inhale 2 puffs every 6 hours if needed for shortness of breath. 18 g 0 2024    apixaban (Eliquis) 5 mg tablet Take 1 tablet (5 mg) by mouth 2 times a day. 180 tablet 0 Past Week    aspirin 81 mg EC tablet Take 1 tablet (81 mg) by mouth once daily.   2024    isosorbide mononitrate ER (Imdur) 120 mg 24 hr tablet Take 1 tablet (120 mg) by mouth once daily. Do not crush or chew. 90 tablet 3 2024    lisinopril 10 mg tablet Take 1 tablet (10 mg) by mouth once daily. 90 tablet 3 Past Week    metFORMIN  mg 24 hr tablet Take 1 tablet (500 mg) by mouth once daily in the evening. Take with meals. 90 tablet 0 2024    metoprolol succinate XL (Toprol-XL) 50 mg 24 hr tablet Take 1 tablet (50 mg) by mouth once daily. 90 tablet 3 2024    nitroglycerin (Nitrostat) 0.4 mg SL tablet Place 1 tablet (0.4 mg) under the tongue every 5 minutes if needed for chest pain. May repeat dose every 5 minutes for up to 3 doses total. 90 tablet 1 Past Week    rosuvastatin (Crestor) 40 mg tablet Take 1 tablet (40 mg) by mouth once daily. 90 tablet 3 2024    triamcinolone (Kenalog) 0.1 % cream Apply 1 Application topically 2 times a day as needed (hisotry of dermatitis). Monday - Friday only (Patient not taking: Reported on 2024) 45 g 0 More than a month     Patient has no known allergies.  Social History     Tobacco Use    Smoking status: Former     Current packs/day: 0.00     Types: Cigarettes     Quit date:      Years since quittin.7     Passive exposure: Never    Smokeless tobacco:  Never   Vaping Use    Vaping status: Never Used   Substance Use Topics    Alcohol use: Not Currently    Drug use: Never     Family History   Problem Relation Name Age of Onset    Diabetes Mother      Other (Other) Father          Arteriosclerotic cardiovascular disease; cardiac disorder    Heart attack Father      Diabetes Sister      Other (Other) Other Grandfather         cardiac disorder       Review of Systems:  14 point review of systems was completed and negative except for those specially mention in my HPI    Physical Exam:    Heart Rate:  [74-86]   Temp:  [36 °C (96.8 °F)-37.5 °C (99.5 °F)]   Resp:  [14-25]   BP: (100-133)/(40-68)   Weight:  [108 kg (237 lb 3.4 oz)]   SpO2:  [94 %-98 %]     Physical Exam  Gen: NAD, Aax3  Heart: RRR, rub present  Lungs: CTAB  Abd; Soft, distended, NTTP    Objective:    I have reviewed all medications, laboratory results, and imaging pertinent for today's encounter      Component      Latest Ref Rng 9/24/2024 9/25/2024   GLUCOSE      74 - 99 mg/dL 151 (H)  116 (H)    SODIUM      136 - 145 mmol/L 138  134 (L)    POTASSIUM      3.5 - 5.3 mmol/L 4.1  3.7    CHLORIDE      98 - 107 mmol/L 108 (H)  102    Bicarbonate      21 - 32 mmol/L 24  28    Anion Gap      10 - 20 mmol/L 10  8 (L)    Blood Urea Nitrogen      6 - 23 mg/dL 15  18    Creatinine      0.50 - 1.30 mg/dL 0.87  0.79    EGFR      >60 mL/min/1.73m*2 >90  >90    Calcium      8.6 - 10.3 mg/dL 8.5 (L)  8.6    PHOSPHORUS      2.5 - 4.9 mg/dL 4.0  2.1 (L)    Albumin      3.4 - 5.0 g/dL 3.9  3.6    WBC      4.4 - 11.3 x10*3/uL 7.7  6.8    nRBC      0.0 - 0.0 /100 WBCs 0.0  0.0    RBC      4.50 - 5.90 x10*6/uL 3.76 (L)  3.48 (L)    HEMOGLOBIN      13.5 - 17.5 g/dL 10.7 (L)  9.9 (L)    HEMATOCRIT      41.0 - 52.0 % 31.1 (L)  29.3 (L)    MCV      80 - 100 fL 83  84    MCH      26.0 - 34.0 pg 28.5  28.4    MCHC      32.0 - 36.0 g/dL 34.4  33.8    RED CELL DISTRIBUTION WIDTH      11.5 - 14.5 % 14.6 (H)  14.6 (H)    Platelets       150 - 450 x10*3/uL 84 (L)  52 (L)    POCT Calcium, Ionized      1.1 - 1.33 mmol/L 1.13  1.22    MAGNESIUM      1.60 - 2.40 mg/dL 2.11  2.00    POCT Glucose      74 - 99 mg/dL 105 (H)  143 (H)    POCT Glucose       132 (H)  132 (H)    POCT Glucose       144 (H)  120 (H)    POCT Glucose       178 (H)     POCT Glucose       154 (H)     POCT Glucose       133 (H)     POCT Glucose       147 (H)        Legend:  (H) High  (L) Low    Assessment/Plan:    I am currently managing this critically ill patient for the following problems:    Assessment/Plan  Neuro/Psych/Pain Ctrl/Sedation:  #Post-Op Pain  - Lidocaine patches  -Tylenol  -Avoid narcotics with abdominal distention and dislike for narcotics     Respiratory/ENT:  #Post-Op Respiratory Insufficiency  #hx Pulmonary Embolism currently on Eliquis   -- On NC  -- Wean supplemental oxygen for spO2 goal > 92%  -- Daily CXR  -- albuterol PRN   -- incentive spirometry   -- CT removed  -- holding home eliquis      Cardiovascular:  #Multivessel CAD s/p CABG x 3   #Hx of HTN and hyperlipidemia   Patient underwent LIMA-LAD, RA-OM, SVG-PDA  -- Ventricular wires in place, unpaced post op; settings:   -- ECHO 55-60% EF, mild LVH   -- Daily BMP, keep K >4, Mg > 2  -- continue ASA and statin   -- metoprolol 25 mg BID  -- colchicine due to pericardial friction rub   -- holding home antihypertensive medications   -- holding home eliquis      GI:  #hx Liver Cirrhosis with ETOH abuse  -- PPI    Renal/Volume Status (Intra & Extravascular):  --No issues  --Harris removed  --Lasix 20mg x 1    Endocrine  #Hyperglycemia  #T2DM   -- Strict blood glucose control post-operatively  -- Glucose goal   -- cardiac SSI   -- Holding home metformin      Infectious Disease:  No concern for active infection  -- Perioperative ancef x 48 hours  -- Monitor SIRS criteria     Heme/Onc:  #Acute Blood Loss Anemia Post-Op  #Chronic thrombocytopenia  Baseline Hgb 15  -- Received 1 unit of PLT post op   --Plt  84->52  --Hold Heparin, will defer workup HIT workup given has baseline thrombocytopenia     ORTHO/MSK:  -- PT/OT, Cardiac Rehab      Ethics/Code Status:  FULL CODE     :  DVT Prophylaxis: Holding due to low PLT   GI Prophylaxis: oral PPI   Bowel Regimen: Docusate, Senna, Miralax  Diet:  cardiac diet  CVC: DC today  Jackie: None  Harris: None  Restraints: None  Dispo: [facility]    Critical Care Time:  37 minutes    Fabian Mercedes MD  09/25/24  12:25 PM

## 2024-09-25 NOTE — PROGRESS NOTES
Physical Therapy    Physical Therapy Treatment    Patient Name: Yuri Reynolds  MRN: [id number]  Department: [facility]  Room: [facility]814-A  Today's Date: 9/25/2024  Time Calculation  Start Time: 1353  Stop Time: 1419  Time Calculation (min): 26 min         Assessment/Plan   PT Assessment  PT Assessment Results: Decreased strength, Decreased endurance, Impaired balance, Decreased mobility, Decreased coordination, Pain, Impaired hearing  Rehab Prognosis: Good  End of Session Communication: Bedside nurse  Assessment Comment: Pt is making progress toward goals with mild SOB but able to increase gait distance. Good recall and follow through with MITT precautions. Pt will benefit from continued PT services to increase functional mobility, strength and endurance.  End of Session Patient Position: Up in chair, Alarm off, not on at start of session  PT Plan  Inpatient/Swing Bed or Outpatient: Inpatient  PT Plan  Treatment/Interventions: Bed mobility, Transfer training, Gait training, Therapeutic exercise  PT Plan: Ongoing PT  PT Frequency: 4 times per week  PT Discharge Recommendations: Low intensity level of continued care  Equipment Recommended upon Discharge: Wheeled walker, Straight cane  PT Recommended Transfer Status: Assist x1    General Visit Information:   PT  Visit  PT Received On: 09/25/24  General  Reason for Referral: Recent surgery  Referred By: Emmett NICOLE  Past Medical History Relevant to Rehab: HLD, HTN, MI, CAD, DM, ETOH abuse, SBO, Cirrhosis, PE  Family/Caregiver Present: Yes  Caregiver Feedback: Wife present - supportive.  Prior to Session Communication: Bedside nurse  Patient Position Received: Bed, 3 rail up, Alarm off, not on at start of session  General Comment: Pt is pleasant and cooperative agreeable to PT services.    Subjective   Precautions:  Precautions  Medical Precautions: Fall precautions  Post-Surgical Precautions: Move in the Tube      Objective   Pain:  Pain Assessment  Pain Assessment: 0-10  0-10  (Numeric) Pain Score: 2  Pain Type: Surgical pain  Pain Location: Chest  Cognition:  Cognition  Orientation Level: Oriented X4  Coordination:     Postural Control:     Extremity/Trunk Assessments:        Activity Tolerance:  Activity Tolerance  Endurance: Tolerates less than 10 min exercise with changes in vital signs  Treatments:  Therapeutic Exercise  Therapeutic Exercise Performed: Yes (Seated AP, LAQ and marches x 10ea BLE)    Bed Mobility  Bed Mobility: Yes (Pt completing logroll with CGA and verbal instruction throughout for technique. Good follow through.)    Ambulation/Gait Training  Ambulation/Gait Training Performed: Yes (Pt ambulating 65' x 2 with a slow, reciprocal gait with WW and 3L O2 with decreased step height/length. Pt oxygen drops mildly to 88% taking 3 standing rest breaks.)  Transfers  Transfer: Yes (Sit<>stand transfers from EOB and chair with WW and good return demo to maintain MITT precautions. Pt using heart pillow for sit>stand and hands on thighs for stand>sit.)    Outcome Measures:  [facility] Basic Mobility  Turning from your back to your side while in a flat bed without using bedrails: A little  Moving from lying on your back to sitting on the side of a flat bed without using bedrails: A little  Moving to and from bed to chair (including a wheelchair): A little  Standing up from a chair using your arms (e.g. wheelchair or bedside chair): A little  To walk in hospital room: A little  Climbing 3-5 steps with railing: Total  Basic Mobility - Total Score: 16    Education Documentation  Precautions, taught by Ingrid Thomas PTA at 9/25/2024  2:34 PM.  Learner: Patient  Readiness: Acceptance  Method: Explanation  Response: Verbalizes Understanding    Mobility Training, taught by Ingrid Thomas PTA at 9/25/2024  2:34 PM.  Learner: Patient  Readiness: Acceptance  Method: Explanation  Response: Verbalizes Understanding    Education Comments  No comments found.        EDUCATION:  Outpatient  Education  Individual(s) Educated: Patient, Spouse  Education Provided: Body Mechanics, Fall Risk, Home Exercise Program, Post-Op Precautions, POC    Encounter Problems       Encounter Problems (Active)       PT Problem       Pt will demonstrate mod I  with bed mobility to edge of bed.   (Progressing)       Start:  09/24/24    Expected End:  10/08/24            Pt will demonstrate mod I with sit to stand/chair transfers with least restrictive device.   (Progressing)       Start:  09/24/24    Expected End:  10/08/24            Pt will ambulate 150 feet with mod I  least restrictive device . ' (Progressing)       Start:  09/24/24    Expected End:  10/08/24            Pt to demo 12 steps with  rails with SBA  (Progressing)       Start:  09/24/24    Expected End:  10/08/24               Pain - Adult

## 2024-09-25 NOTE — PROGRESS NOTES
Yuri Reynolds is a 64 y.o. male on day 2 of admission presenting with Coronary artery disease of native artery of native heart with stable angina pectoris.    Subjective   No acute overnight events.  This morning is afebrile, maintaining sinus rhythm, normotensive with systolic blood pressure 100-130.  Chest x-ray is stable, maintaining adequate saturations on 2 L nasal cannula.  Spirometry volumes improving to 752,000 mL.  Responded well to diuresis yesterday with -1.6 L output, length of stay fluid balance +755 mL.  Continue diuresis today.  Postoperative pain is well-controlled, primary complaint is nausea and frequent belching.  Abdomen appears slightly more distended with hypoactive bowel sounds.  Patient reports lower abdominal pressure but unable to pass flatus.  Will change diet back to clears, add scheduled Reglan, simethicone and obtain KUB.  Narcotics have been discontinued.  IV acetaminophen and tramadol added as alternative for pain management.  Stable to downgrade to telemetry today.     Objective     Physical Exam  Constitutional:       General: He is not in acute distress.  HENT:      Head: Normocephalic.      Nose: Nose normal.      Mouth/Throat:      Mouth: Mucous membranes are moist.   Eyes:      Pupils: Pupils are equal, round, and reactive to light.   Neck:      Comments: RIJ Cordis  removed   Cardiovascular:      Rate and Rhythm: Normal rate and regular rhythm.      Pulses: Normal pulses.      Heart sounds:      Friction rub present.      Comments: Ventricular wires insulated  Pulmonary:      Comments: Improving inspiratory effort  Spirometry volumes 52,000 mL  Sternum stable, sternotomy well-approximated  Chest tube sutures intact no erythema or drainage    Abdominal:      Comments: Abdomen slightly distended with hypoactive bowel sounds   Genitourinary:     Comments: Harris draining clear yellow, to be removed today  Musculoskeletal:      Cervical back: Normal range of motion.      Comments:  Trace generalized edema   Skin:     General: Skin is warm and dry.      Comments: Left radial harvest site ecchymotic, well-approximated without erythema  Right EVH incisions well-approximated, diffuse surrounding ecchymosis   Neurological:      General: No focal deficit present.      Mental Status: He is alert and oriented to person, place, and time.   Psychiatric:         Mood and Affect: Mood normal.         Behavior: Behavior normal.         Last Recorded Vitals  Blood pressure 109/68, pulse 78, temperature 36 °C (96.8 °F), temperature source Temporal, resp. rate 25, height 1.829 m (6'), weight 108 kg (237 lb 3.4 oz), SpO2 97%.  Intake/Output last 3 Shifts:  I/O last 3 completed shifts:  In: 2314.6 (21.5 mL/kg) [I.V.:1004.6 (9.3 mL/kg); IV Piggyback:1310]  Out: 3674 (34.1 mL/kg) [Urine:3170 (0.8 mL/kg/hr); Chest Tube:504]  Weight: 107.6 kg     Relevant Results  Scheduled medications  albumin human, 25 g, intravenous, Once  aspirin, 81 mg, oral, Daily  aspirin, 150 mg, rectal, Once  ceFAZolin, 2 g, intravenous, q8h  colchicine, 0.6 mg, oral, Daily  docusate sodium, 100 mg, oral, TID  furosemide, 20 mg, intravenous, Once  insulin lispro, 0-15 Units, subcutaneous, q4h  lactated Ringer's, 500 mL, intravenous, Once  lidocaine, 1 patch, transdermal, q24h  metoclopramide, 10 mg, intravenous, q6h URI  metoprolol tartrate, 25 mg, oral, BID  mupirocin, 1 Application, Each Nostril, BID  oxygen, , inhalation, Continuous - Inhalation  polyethylene glycol, 17 g, oral, BID  potassium phosphate, 21 mmol, intravenous, Once  rosuvastatin, 40 mg, oral, Daily  simethicone, 80 mg, oral, 4x daily      Continuous medications     PRN medications  PRN medications: albuterol, alteplase, bisacodyl, calcium chloride, calcium chloride, dextrose **OR** glucagon, magnesium citrate, magnesium sulfate, magnesium sulfate, [DISCONTINUED] metoclopramide **OR** metoclopramide, naloxone, ondansetron, oxyCODONE, oxyCODONE, potassium chloride,  potassium chloride, traMADol      Results for orders placed or performed during the hospital encounter of 09/23/24 (from the past 24 hour(s))   POCT GLUCOSE   Result Value Ref Range    POCT Glucose 178 (H) 74 - 99 mg/dL   ECG 12 lead   Result Value Ref Range    Ventricular Rate 86 BPM    Atrial Rate 86 BPM    CT Interval 190 ms    QRS Duration 88 ms    QT Interval 356 ms    QTC Calculation(Bazett) 426 ms    P Axis 67 degrees    R Axis 2 degrees    T Axis 11 degrees    QRS Count 15 beats    Q Onset 224 ms    P Onset 129 ms    P Offset 175 ms    T Offset 402 ms    QTC Fredericia 401 ms   POCT GLUCOSE   Result Value Ref Range    POCT Glucose 144 (H) 74 - 99 mg/dL   POCT GLUCOSE   Result Value Ref Range    POCT Glucose 132 (H) 74 - 99 mg/dL   POCT GLUCOSE   Result Value Ref Range    POCT Glucose 105 (H) 74 - 99 mg/dL   Calcium, Ionized   Result Value Ref Range    POCT Calcium, Ionized 1.22 1.1 - 1.33 mmol/L   Magnesium   Result Value Ref Range    Magnesium 2.00 1.60 - 2.40 mg/dL   CBC   Result Value Ref Range    WBC 6.8 4.4 - 11.3 x10*3/uL    nRBC 0.0 0.0 - 0.0 /100 WBCs    RBC 3.48 (L) 4.50 - 5.90 x10*6/uL    Hemoglobin 9.9 (L) 13.5 - 17.5 g/dL    Hematocrit 29.3 (L) 41.0 - 52.0 %    MCV 84 80 - 100 fL    MCH 28.4 26.0 - 34.0 pg    MCHC 33.8 32.0 - 36.0 g/dL    RDW 14.6 (H) 11.5 - 14.5 %    Platelets 52 (L) 150 - 450 x10*3/uL   Renal Function Panel   Result Value Ref Range    Glucose 116 (H) 74 - 99 mg/dL    Sodium 134 (L) 136 - 145 mmol/L    Potassium 3.7 3.5 - 5.3 mmol/L    Chloride 102 98 - 107 mmol/L    Bicarbonate 28 21 - 32 mmol/L    Anion Gap 8 (L) 10 - 20 mmol/L    Urea Nitrogen 18 6 - 23 mg/dL    Creatinine 0.79 0.50 - 1.30 mg/dL    eGFR >90 >60 mL/min/1.73m*2    Calcium 8.6 8.6 - 10.3 mg/dL    Phosphorus 2.1 (L) 2.5 - 4.9 mg/dL    Albumin 3.6 3.4 - 5.0 g/dL   POCT GLUCOSE   Result Value Ref Range    POCT Glucose 120 (H) 74 - 99 mg/dL   POCT GLUCOSE   Result Value Ref Range    POCT Glucose 132 (H) 74 - 99  mg/dL           ECG 12 lead    Result Date: 9/24/2024  Normal sinus rhythm Possible Inferior infarct , age undetermined Abnormal ECG When compared with ECG of 24-SEP-2024 08:30, (unconfirmed) Borderline criteria for Inferior infarct are now Present    XR chest 1 view    Result Date: 9/24/2024  Interpreted By:  Sagar Colón, STUDY: XR CHEST 1 VIEW;  9/24/2024 11:17 am   INDICATION: Signs/Symptoms:chest tube removal.     COMPARISON: Portable chest, earlier same day 24 September 2020 at 0504 hours   ACCESSION NUMBER(S): [id number]   ORDERING CLINICIAN: JESSI AGUILERA   TECHNIQUE: Single frontal view of the chest; Portable technique   FINDINGS: Left basilar chest tube has been removed   No pneumothorax or any other interval change otherwise       As above   MACRO: None   Signed by: Sagar Colón 9/24/2024 11:22 AM Dictation workstation:   [id number]    XR chest 1 view    Result Date: 9/24/2024  Interpreted By:  Nacho Serrano, STUDY: XR CHEST 1 VIEW;  9/24/2024 5:42 am   INDICATION: Signs/Symptoms:Post op cardiac surgery.     COMPARISON: 09/23/2024.   ACCESSION NUMBER(S): [id number]   ORDERING CLINICIAN: PAULA URBAN   FINDINGS: CARDIOMEDIASTINAL SILHOUETTE: Right jugular line remains in place with tubing tip at the SVC level. Cardiomegaly, aortic prominence and postoperative changes of the mediastinum are unchanged. Cephalad directed midline probable mediastinal drain remains in place. Endotracheal tube and NG tube are no longer seen.   LUNGS: Left basilar chest tube is unchanged in position. Left retrocardiac and basilar consolidation with volume loss is again seen. Small left effusion not excluded. Mild right basilar atelectasis is present. No appreciable pneumothorax.   ABDOMEN: No remarkable upper abdominal findings.   BONES: Bones are stable.       1.  Persistent consolidation and volume loss in the left retrocardiac region and left base. Small left effusion not excluded.       MACRO: None.   Signed by:  Nacho Sfiligoj 9/24/2024 8:39 AM Dictation workstation:   [id number]    ECG 12 Lead    Result Date: 9/24/2024  Normal sinus rhythm ST elevation, consider early repolarization, pericarditis, or injury Abnormal ECG When compared with ECG of 23-SEP-2024 16:20, (unconfirmed) DE interval has decreased ST elevation now present in Lateral leads    XR chest 1 view    Result Date: 9/23/2024  Interpreted By:  Nacho Olvera, STUDY: XR CHEST 1 VIEW;  9/23/2024 4:19 pm   INDICATION: Signs/Symptoms:Post op cardiac surgery.     COMPARISON: 09/23/2024   ACCESSION NUMBER(S): [id number]   ORDERING CLINICIAN: PAULA URBAN   FINDINGS: AP radiograph of the chest was provided.   Stable right-sided Assawoman-Usman catheter noted. There has been interval placement of endotracheal tube noted ending 4 cm above the roldan. There is a nasogastric tube noted coursing inferior to the diaphragm in satisfactory position.   CARDIOMEDIASTINAL SILHOUETTE: Cardiomediastinal silhouette is normal in size and configuration.   LUNGS: Interval development of region of airspace consolidation noted in the left mid lung field. There is a small left-sided pleural effusion.   ABDOMEN: No remarkable upper abdominal findings.   BONES: No acute osseous changes. There are sternotomy wires noted.       1.  Stable right-sided Assawoman-Usman catheter noted. There has been interval placement of endotracheal tube noted ending 4 cm above the roldan. There is a nasogastric tube noted coursing inferior to the diaphragm in satisfactory position. 2. Interval development of region of airspace consolidation noted in the left mid lung field. There is a small left-sided pleural effusion.         MACRO: None   Signed by: Nacho Olvera 9/23/2024 4:25 PM Dictation workstation:   [id number]    ECG 12 Lead    Result Date: 9/23/2024  Sinus rhythm with 1st degree AV block Low voltage QRS Borderline ECG When compared with ECG of 30-AUG-2024 11:42, T wave amplitude has decreased in  Anterior leads             Assessment/Plan   Assessment & Plan  Coronary artery disease of native artery of native heart with stable angina pectoris    Chest pain, unspecified type    Angina pectoris, unstable (Multi)    Coronary artery disease; status post CABG  Status post CABG x 3 with LIMA to LAD, radial graft to OM, and saphenous vein graft to PDA on 9/23.  -ICU following appreciate assistance  -Stable to downgrade to telemetry  -Continue daily low-dose aspirin, statin  -Remove RIJ cordis  -Metoprolol 25 mg twice daily  -Continue diuresis with Lasix 20 mg IV x 1 further recommendations based on response  -Replete electrolytes to maintain potassium greater than 4.0, magnesium greater than 2.0  -DC IV fluids  -DVT prophylaxis with PADMA hose and SCDs, no chemoprophylaxis due to thrombocytopenia  -Clear liquid diet until passing flatus  -Check KUB  -Add simethicone and scheduled Reglan x 24 hours  -Bowel regimen with MiraLAX and Colace  -Prophylaxis with Protonix  -Tylenol and  tramadol 50 mg every 6 hours as needed added as alternative to narcotics  -Incentive spirometry, out of bed 3 times daily  -PT/OT following     Anemia; thrombocytopenia  Acute postoperative blood loss anemia secondary to hemodilution and consumption, chronic thrombocytopenia with baseline platelets ranging 60-80.  POD #1: Hemoglobin 10.7, platelet count 84K, no signs of active bleeding  POD #2: Hemoglobin 9.9, platelet count 52K, no signs of active bleeding  -No chemoprophylaxis for DVT, PADMA hose and SCDs only  -Follow CBC     Cirrhosis  Chronic hepatic cirrhosis, LFTs stable       I spent 35 minutes in the professional and overall care of this patient.      Radha Luciano, APRN-CNP

## 2024-09-25 NOTE — PROGRESS NOTES
Rounding DANIEL/Cardiologist:  Omkar Newman DO, Dr. Omkar Newman  Primary Cardiologist: Dr. Kip Birmingham    Date:  9/25/2024  Patient:  Yuri Reynolds  YOB: 1960  MRN:  [id number]   Admit Date:  9/23/2024      SUBJECTIVE:    Yuri Renyolds was seen and examined today at bedside.     Denies any worsening shortness of breath or chest pain.    Remains in normal sinus rhythm.    Fluid balance -1400        VITALS:     Vitals:    09/25/24 0600 09/25/24 0700 09/25/24 0800 09/25/24 0919   BP: 109/59 133/57 131/59 109/68   BP Location: Right arm      Patient Position: Lying      Pulse: 80 80 80 78   Resp: 22 21 18 25   Temp:       TempSrc:       SpO2: 95% 97% 96% 97%   Weight:       Height:           Intake/Output Summary (Last 24 hours) at 9/25/2024 1034  Last data filed at 9/25/2024 0600  Gross per 24 hour   Intake 592 ml   Output 2025 ml   Net -1433 ml       Wt Readings from Last 4 Encounters:   09/25/24 108 kg (237 lb 3.4 oz)   09/16/24 108 kg (237 lb 7 oz)   09/11/24 106 kg (233 lb 1.6 oz)   09/03/24 108 kg (237 lb)       CURRENT HOSPITAL MEDICATIONS:   albumin human, 25 g, intravenous, Once  aspirin, 81 mg, oral, Daily  aspirin, 150 mg, rectal, Once  ceFAZolin, 2 g, intravenous, q8h  colchicine, 0.6 mg, oral, Daily  docusate sodium, 100 mg, oral, TID  furosemide, 20 mg, intravenous, Once  insulin lispro, 0-15 Units, subcutaneous, q4h  lactated Ringer's, 500 mL, intravenous, Once  lidocaine, 1 patch, transdermal, q24h  metoclopramide, 10 mg, intravenous, q6h URI  metoprolol tartrate, 25 mg, oral, BID  mupirocin, 1 Application, Each Nostril, BID  oxygen, , inhalation, Continuous - Inhalation  polyethylene glycol, 17 g, oral, BID  potassium phosphate, 21 mmol, intravenous, Once  rosuvastatin, 40 mg, oral, Daily  simethicone, 80 mg, oral, 4x daily         Current Outpatient Medications   Medication Instructions    albuterol 90 mcg/actuation inhaler 2 puffs, inhalation, Every 6  hours PRN    apixaban (ELIQUIS) 5 mg, oral, 2 times daily    aspirin 81 mg EC tablet 1 tablet, oral, Daily    isosorbide mononitrate ER (IMDUR) 120 mg, oral, Daily, Do not crush or chew.    lisinopril 10 mg, oral, Daily    metFORMIN XR (GLUCOPHAGE-XR) 500 mg, oral, Daily with evening meal    metoprolol succinate XL (TOPROL-XL) 50 mg, oral, Daily    nitroglycerin (NITROSTAT) 0.4 mg, sublingual, Every 5 min PRN, May repeat dose every 5 minutes for up to 3 doses total.    rosuvastatin (CRESTOR) 40 mg, oral, Daily    triamcinolone (Kenalog) 0.1 % cream 1 Application, Topical, 2 times daily PRN, Monday - Friday only        PHYSICAL EXAMINATION:     Physical Exam  HENT:      Head: Normocephalic.      Mouth/Throat:      Mouth: Mucous membranes are moist.   Eyes:      Pupils: Pupils are equal, round, and reactive to light.   Cardiovascular:      Rate and Rhythm: Normal rate and regular rhythm.      Heart sounds: Normal heart sounds.   Pulmonary:      Breath sounds: Decreased air movement present.      Comments: Poor inspiratory effort  Chest:      Comments: Midline incision clean dry and intact  Abdominal:      General: Bowel sounds are normal.      Palpations: Abdomen is soft.   Musculoskeletal:         General: Normal range of motion.      Right lower leg: No edema.      Left lower leg: No edema.   Skin:     General: Skin is warm and dry.      Capillary Refill: Capillary refill takes less than 2 seconds.   Neurological:      Mental Status: He is alert and oriented to person, place, and time.   Psychiatric:         Mood and Affect: Mood normal.         LAB DATA:     CBC:   Results from last 7 days   Lab Units 09/25/24  0308 09/24/24  0330 09/23/24  1604   WBC AUTO x10*3/uL 6.8 7.7 17.9*   RBC AUTO x10*6/uL 3.48* 3.76* 4.38*   HEMOGLOBIN g/dL 9.9* 10.7* 12.7*   HEMATOCRIT % 29.3* 31.1* 35.7*   MCV fL 84 83 82   MCH pg 28.4 28.5 29.0   MCHC g/dL 33.8 34.4 35.6   RDW % 14.6* 14.6* 14.1   PLATELETS AUTO x10*3/uL 52* 84* 140*   140*     CMP:    Results from last 7 days   Lab Units 09/25/24  0308 09/24/24  0330 09/23/24  1604 09/20/24  1519   SODIUM mmol/L 134* 138 144 134*   POTASSIUM mmol/L 3.7 4.1 3.6 4.2   CHLORIDE mmol/L 102 108* 113* 103   CO2 mmol/L 28 24 23 24   BUN mg/dL 18 15 14 20   CREATININE mg/dL 0.79 0.87 0.92 0.97   GLUCOSE mg/dL 116* 151* 112* 113*   PROTEIN TOTAL g/dL  --   --   --  7.4  7.4   CALCIUM mg/dL 8.6 8.5* 7.6* 9.9   BILIRUBIN TOTAL mg/dL  --   --   --  1.0  1.0   ALK PHOS U/L  --   --   --  79  75   AST U/L  --   --   --  23  23   ALT U/L  --   --   --  30  29     BMP:    Results from last 7 days   Lab Units 09/25/24  0308 09/24/24  0330 09/23/24  1604   SODIUM mmol/L 134* 138 144   POTASSIUM mmol/L 3.7 4.1 3.6   CHLORIDE mmol/L 102 108* 113*   CO2 mmol/L 28 24 23   BUN mg/dL 18 15 14   CREATININE mg/dL 0.79 0.87 0.92   CALCIUM mg/dL 8.6 8.5* 7.6*   GLUCOSE mg/dL 116* 151* 112*     Magnesium:  Results from last 7 days   Lab Units 09/25/24  0308 09/24/24  0330 09/23/24  1604   MAGNESIUM mg/dL 2.00 2.11 2.60*     Troponin:      BNP:     Lipid Panel:         DIAGNOSTIC TESTING:   @No results found for this or any previous visit.    ECG 12 lead    Result Date: 9/24/2024  Normal sinus rhythm Possible Inferior infarct , age undetermined Abnormal ECG When compared with ECG of 24-SEP-2024 08:30, (unconfirmed) Borderline criteria for Inferior infarct are now Present    XR chest 1 view    Result Date: 9/24/2024  Interpreted By:  Sagar Colón, STUDY: XR CHEST 1 VIEW;  9/24/2024 11:17 am   INDICATION: Signs/Symptoms:chest tube removal.     COMPARISON: Portable chest, earlier same day 24 September 2020 at 0504 hours   ACCESSION NUMBER(S): [id number]   ORDERING CLINICIAN: JESSI AGUILERA   TECHNIQUE: Single frontal view of the chest; Portable technique   FINDINGS: Left basilar chest tube has been removed   No pneumothorax or any other interval change otherwise       As above   MACRO: None   Signed by: Sagar Colón  9/24/2024 11:22 AM Dictation workstation:   [id number]    XR chest 1 view    Result Date: 9/24/2024  Interpreted By:  Sherwin Goldstein, STUDY: XR CHEST 1 VIEW;  9/23/2024 8:50 am   INDICATION: Signs/Symptoms:post line insertion; RN to call when ready.   COMPARISON: None.   ACCESSION NUMBER(S): [id number]   ORDERING CLINICIAN: SANTO COLEMAN   FINDINGS: CHEST/LUNGS: The cardiac and mediastinal silhouettes are within normal limits for the technique. No focal areas of consolidation are noted. No pneumothorax is seen.   Right-sided [place]-Usman catheter is in place with the tip terminating in the expected location of the proximal aspect of the main pulmonary trunk. Mild blunting of the right costophrenic angle may relate to a trace effusion or pleural thickening.   UPPER ABDOMEN: No remarkable upper abdominal findings.   OSSEOUS STRUCTURES: No acute changes.       [place]-Usman catheter tip overlies the proximal aspect of the main pulmonary trunk. Blunting of the right costophrenic angle which may relate to a trace effusion or pleural thickening.   MACRO: None.   Signed by: Sherwin Goldstein 9/24/2024 8:59 AM Dictation workstation:   [id number]    XR chest 1 view    Result Date: 9/24/2024  Interpreted By:  Nacho Serrano, STUDY: XR CHEST 1 VIEW;  9/24/2024 5:42 am   INDICATION: Signs/Symptoms:Post op cardiac surgery.     COMPARISON: 09/23/2024.   ACCESSION NUMBER(S): [id number]   ORDERING CLINICIAN: PAULA URBAN   FINDINGS: CARDIOMEDIASTINAL SILHOUETTE: Right jugular line remains in place with tubing tip at the SVC level. Cardiomegaly, aortic prominence and postoperative changes of the mediastinum are unchanged. Cephalad directed midline probable mediastinal drain remains in place. Endotracheal tube and NG tube are no longer seen.   LUNGS: Left basilar chest tube is unchanged in position. Left retrocardiac and basilar consolidation with volume loss is again seen. Small left effusion not excluded. Mild right basilar atelectasis  is present. No appreciable pneumothorax.   ABDOMEN: No remarkable upper abdominal findings.   BONES: Bones are stable.       1.  Persistent consolidation and volume loss in the left retrocardiac region and left base. Small left effusion not excluded.       MACRO: None.   Signed by: Nacho Serrano 9/24/2024 8:39 AM Dictation workstation:   [id number]    ECG 12 Lead    Result Date: 9/24/2024  Normal sinus rhythm ST elevation, consider early repolarization, pericarditis, or injury Abnormal ECG When compared with ECG of 23-SEP-2024 16:20, (unconfirmed) MA interval has decreased ST elevation now present in Lateral leads    XR chest 1 view    Result Date: 9/23/2024  Interpreted By:  Nacho Olvera, STUDY: XR CHEST 1 VIEW;  9/23/2024 4:19 pm   INDICATION: Signs/Symptoms:Post op cardiac surgery.     COMPARISON: 09/23/2024   ACCESSION NUMBER(S): [id number]   ORDERING CLINICIAN: PAULA URBAN   FINDINGS: AP radiograph of the chest was provided.   Stable right-sided [place]-Usman catheter noted. There has been interval placement of endotracheal tube noted ending 4 cm above the roldan. There is a nasogastric tube noted coursing inferior to the diaphragm in satisfactory position.   CARDIOMEDIASTINAL SILHOUETTE: Cardiomediastinal silhouette is normal in size and configuration.   LUNGS: Interval development of region of airspace consolidation noted in the left mid lung field. There is a small left-sided pleural effusion.   ABDOMEN: No remarkable upper abdominal findings.   BONES: No acute osseous changes. There are sternotomy wires noted.       1.  Stable right-sided [place]-Usman catheter noted. There has been interval placement of endotracheal tube noted ending 4 cm above the roldan. There is a nasogastric tube noted coursing inferior to the diaphragm in satisfactory position. 2. Interval development of region of airspace consolidation noted in the left mid lung field. There is a small left-sided pleural effusion.         MACRO:  None   Signed by: Nacho Olvera 9/23/2024 4:25 PM Dictation workstation:   [id number]    ECG 12 Lead    Result Date: 9/23/2024  Sinus rhythm with 1st degree AV block Low voltage QRS Borderline ECG When compared with ECG of 30-AUG-2024 11:42, T wave amplitude has decreased in Anterior leads       XR chest 1 view   Final Result   As above        MACRO:   None        Signed by: Sagar Colón 9/24/2024 11:22 AM   Dictation workstation:   [id number]      XR chest 1 view   Final Result   1.  Persistent consolidation and volume loss in the left retrocardiac   region and left base. Small left effusion not excluded.                  MACRO:   None.        Signed by: Nacho Serrano 9/24/2024 8:39 AM   Dictation workstation:   [id number]      XR chest 1 view   Final Result   1.  Stable right-sided [place]-Usman catheter noted. There has been   interval placement of endotracheal tube noted ending 4 cm above the   roldan. There is a nasogastric tube noted coursing inferior to the   diaphragm in satisfactory position.   2. Interval development of region of airspace consolidation noted in   the left mid lung field. There is a small left-sided pleural effusion.                       MACRO:   None        Signed by: Nacho Olvera 9/23/2024 4:25 PM   Dictation workstation:   [id number]      XR chest 1 view   Final Result   [place]-Usman catheter tip overlies the proximal aspect of the main   pulmonary trunk. Blunting of the right costophrenic angle which may   relate to a trace effusion or pleural thickening.        MACRO:   None.        Signed by: Sherwin Goldstein 9/24/2024 8:59 AM   Dictation workstation:   [id number]      Anesthesia Intraoperative Transesophageal Echocardiogram    (Results Pending)   XR abdomen 1 view    (Results Pending)         RADIOLOGY:     XR chest 1 view   Final Result   As above        MACRO:   None        Signed by: Sagar Colón 9/24/2024 11:22 AM   Dictation workstation:   [id number]      XR chest 1 view    Final Result   1.  Persistent consolidation and volume loss in the left retrocardiac   region and left base. Small left effusion not excluded.                  MACRO:   None.        Signed by: Nacho Serrano 9/24/2024 8:39 AM   Dictation workstation:   [id number]      XR chest 1 view   Final Result   1.  Stable right-sided [place]-Usman catheter noted. There has been   interval placement of endotracheal tube noted ending 4 cm above the   roldan. There is a nasogastric tube noted coursing inferior to the   diaphragm in satisfactory position.   2. Interval development of region of airspace consolidation noted in   the left mid lung field. There is a small left-sided pleural effusion.                       MACRO:   None        Signed by: Nacho Olvera 9/23/2024 4:25 PM   Dictation workstation:   [id number]      XR chest 1 view   Final Result   [place]-Usman catheter tip overlies the proximal aspect of the main   pulmonary trunk. Blunting of the right costophrenic angle which may   relate to a trace effusion or pleural thickening.        MACRO:   None.        Signed by: Sherwin Goldstein 9/24/2024 8:59 AM   Dictation workstation:   [id number]      Anesthesia Intraoperative Transesophageal Echocardiogram    (Results Pending)   XR abdomen 1 view    (Results Pending)       PROBLEM LIST     Patient Active Problem List   Diagnosis    Abnormal LFTs    Coronary artery disease involving native coronary artery of native heart with angina pectoris (CMS-HCC)    Bilateral sensorineural hearing loss    Chronic nasal congestion    Cirrhosis (Multi)    Cough    Dizziness    Elevated serum creatinine    Eustachian tube dysfunction    Fatty liver    Hearing loss, bilateral    History of PTCA    Hyperlipidemia    Hypersplenism    Hypertension    Inability to maintain erection    Insomnia    Memory changes    Nicotine dependence    Palpitations    Past myocardial infarction    Retained myringotomy tube    Small bowel obstruction (Multi)     Somnolence, daytime    Thrombocytopenia (CMS-HCC)    Type 2 diabetes mellitus without complication, without long-term current use of insulin (Multi)    History of alcohol abuse    Class 1 obesity with serious comorbidity and body mass index (BMI) of 33.0 to 33.9 in adult    Abnormal finding in urine    Alcohol abuse    Anal discharge    Bursitis of elbow    Difficulty breathing    Dysfunction of both eustachian tubes    History of cardiac catheterization    History of ear disorder    History of pulmonary embolism    Inflammatory dermatosis    Lateral epicondylitis of both elbows    Portal hypertension (Multi)    Tinnitus, right ear    Pulmonary embolism without acute cor pulmonale, unspecified chronicity, unspecified pulmonary embolism type (Multi)    Chest pain    Angina pectoris, unstable (Multi)    Coronary artery disease of native artery of native heart with stable angina pectoris (CMS-HCC)    Chest pain, unspecified type       ASSESSMENT:   64-year-old gentleman seen evaluate the bedside postop day 1 in the intensive care unit in conjunction with Yaya Pereira RN, CNP.     Bedside examination evaluation performed by me.     Chart reviewed in detail discussed with the patient and staff.     Impression:  CAD with CABG x 3 (LIMA-LAD, RA-OM, SVG-PDA)   Hypertension  Hyperlipidemia  Former nicotine abuse  Former alcohol abuse with cirrhosis  History of PE        PLAN:   Recommendation:  Postop day 2.  Alert and oriented in the ICU.  Remains in chair.  Telemetry shows normal sinus rhythm with no ectopy.  Overnight had normal sinus rhythm with occasional PVCs.  Supplemental O2  Monitor electrolytes, keep potassium greater than 4 and magnesium greater than 2  Postop pain control  Incentive spirometer and pulmonary toilet  Advance diet as tolerated  Aggressive PT/OT  Agree with diuresis, monitor strict I's and O's and daily weights.  Fluid balance -1400 currently.  SSI  Will continue to follow along with you  Message  sent to schedulers for follow-up with Dr. Birmingham  Should be suitable to move to the intermediate care unit    Omkar Newman DO,[facility]         Yaya Pereira [facility]  Adult Gerontology Acute Care Nurse Practitioner  [facility]   [facility]  [phone]          Of note, this documentation is completed using the Dragon Dictation system (voice recognition software). There may be spelling and/or grammatical errors that were not corrected prior to final submission.    Please do not hesitate to call with questions.  Electronically signed by Omkar Newman DO, on 9/25/2024 at 10:34 AM

## 2024-09-26 ENCOUNTER — APPOINTMENT (OUTPATIENT)
Dept: RADIOLOGY | Facility: HOSPITAL | Age: 64
DRG: 236 | End: 2024-09-26
Payer: COMMERCIAL

## 2024-09-26 ENCOUNTER — APPOINTMENT (OUTPATIENT)
Dept: HEMATOLOGY/ONCOLOGY | Facility: CLINIC | Age: 64
End: 2024-09-26
Payer: COMMERCIAL

## 2024-09-26 LAB
ALBUMIN SERPL BCP-MCNC: 3.8 G/DL (ref 3.4–5)
ANION GAP SERPL CALC-SCNC: 13 MMOL/L (ref 10–20)
BUN SERPL-MCNC: 19 MG/DL (ref 6–23)
CALCIUM SERPL-MCNC: 8.9 MG/DL (ref 8.6–10.3)
CHLORIDE SERPL-SCNC: 100 MMOL/L (ref 98–107)
CO2 SERPL-SCNC: 26 MMOL/L (ref 21–32)
CREAT SERPL-MCNC: 0.8 MG/DL (ref 0.5–1.3)
EGFRCR SERPLBLD CKD-EPI 2021: >90 ML/MIN/1.73M*2
ERYTHROCYTE [DISTWIDTH] IN BLOOD BY AUTOMATED COUNT: 14.3 % (ref 11.5–14.5)
GLUCOSE BLD MANUAL STRIP-MCNC: 107 MG/DL (ref 74–99)
GLUCOSE BLD MANUAL STRIP-MCNC: 116 MG/DL (ref 74–99)
GLUCOSE BLD MANUAL STRIP-MCNC: 118 MG/DL (ref 74–99)
GLUCOSE BLD MANUAL STRIP-MCNC: 129 MG/DL (ref 74–99)
GLUCOSE BLD MANUAL STRIP-MCNC: 132 MG/DL (ref 74–99)
GLUCOSE BLD MANUAL STRIP-MCNC: 146 MG/DL (ref 74–99)
GLUCOSE BLD MANUAL STRIP-MCNC: 97 MG/DL (ref 74–99)
GLUCOSE SERPL-MCNC: 113 MG/DL (ref 74–99)
HCT VFR BLD AUTO: 29.9 % (ref 41–52)
HGB BLD-MCNC: 10.2 G/DL (ref 13.5–17.5)
HOLD SPECIMEN: NORMAL
MAGNESIUM SERPL-MCNC: 2.11 MG/DL (ref 1.6–2.4)
MCH RBC QN AUTO: 28.4 PG (ref 26–34)
MCHC RBC AUTO-ENTMCNC: 34.1 G/DL (ref 32–36)
MCV RBC AUTO: 83 FL (ref 80–100)
NRBC BLD-RTO: 0 /100 WBCS (ref 0–0)
PHOSPHATE SERPL-MCNC: 2.3 MG/DL (ref 2.5–4.9)
PLATELET # BLD AUTO: 56 X10*3/UL (ref 150–450)
POTASSIUM SERPL-SCNC: 4.2 MMOL/L (ref 3.5–5.3)
RBC # BLD AUTO: 3.59 X10*6/UL (ref 4.5–5.9)
SODIUM SERPL-SCNC: 135 MMOL/L (ref 136–145)
WBC # BLD AUTO: 6.6 X10*3/UL (ref 4.4–11.3)

## 2024-09-26 PROCEDURE — 37799 UNLISTED PX VASCULAR SURGERY: CPT | Performed by: NURSE PRACTITIONER

## 2024-09-26 PROCEDURE — 2500000004 HC RX 250 GENERAL PHARMACY W/ HCPCS (ALT 636 FOR OP/ED): Performed by: NURSE PRACTITIONER

## 2024-09-26 PROCEDURE — 97530 THERAPEUTIC ACTIVITIES: CPT | Mod: GP,CQ

## 2024-09-26 PROCEDURE — 2060000001 HC INTERMEDIATE ICU ROOM DAILY

## 2024-09-26 PROCEDURE — 82947 ASSAY GLUCOSE BLOOD QUANT: CPT

## 2024-09-26 PROCEDURE — 2500000001 HC RX 250 WO HCPCS SELF ADMINISTERED DRUGS (ALT 637 FOR MEDICARE OP): Performed by: PHARMACIST

## 2024-09-26 PROCEDURE — 85027 COMPLETE CBC AUTOMATED: CPT | Performed by: NURSE PRACTITIONER

## 2024-09-26 PROCEDURE — 71045 X-RAY EXAM CHEST 1 VIEW: CPT

## 2024-09-26 PROCEDURE — 71045 X-RAY EXAM CHEST 1 VIEW: CPT | Performed by: RADIOLOGY

## 2024-09-26 PROCEDURE — 2500000005 HC RX 250 GENERAL PHARMACY W/O HCPCS

## 2024-09-26 PROCEDURE — 2500000001 HC RX 250 WO HCPCS SELF ADMINISTERED DRUGS (ALT 637 FOR MEDICARE OP): Performed by: NURSE PRACTITIONER

## 2024-09-26 PROCEDURE — 80069 RENAL FUNCTION PANEL: CPT | Performed by: NURSE PRACTITIONER

## 2024-09-26 PROCEDURE — 99232 SBSQ HOSP IP/OBS MODERATE 35: CPT | Performed by: NURSE PRACTITIONER

## 2024-09-26 PROCEDURE — 2500000001 HC RX 250 WO HCPCS SELF ADMINISTERED DRUGS (ALT 637 FOR MEDICARE OP)

## 2024-09-26 PROCEDURE — 2500000002 HC RX 250 W HCPCS SELF ADMINISTERED DRUGS (ALT 637 FOR MEDICARE OP, ALT 636 FOR OP/ED): Performed by: NURSE PRACTITIONER

## 2024-09-26 PROCEDURE — 83735 ASSAY OF MAGNESIUM: CPT | Performed by: NURSE PRACTITIONER

## 2024-09-26 PROCEDURE — 99233 SBSQ HOSP IP/OBS HIGH 50: CPT | Performed by: INTERNAL MEDICINE

## 2024-09-26 PROCEDURE — 2500000002 HC RX 250 W HCPCS SELF ADMINISTERED DRUGS (ALT 637 FOR MEDICARE OP, ALT 636 FOR OP/ED)

## 2024-09-26 PROCEDURE — 97535 SELF CARE MNGMENT TRAINING: CPT | Mod: GO

## 2024-09-26 PROCEDURE — 97116 GAIT TRAINING THERAPY: CPT | Mod: GP,CQ

## 2024-09-26 RX ORDER — METOPROLOL TARTRATE 50 MG/1
50 TABLET ORAL 2 TIMES DAILY
Status: DISCONTINUED | OUTPATIENT
Start: 2024-09-26 | End: 2024-09-28 | Stop reason: HOSPADM

## 2024-09-26 RX ORDER — METOCLOPRAMIDE 10 MG/1
10 TABLET ORAL EVERY 6 HOURS SCHEDULED
Status: DISCONTINUED | OUTPATIENT
Start: 2024-09-26 | End: 2024-09-28 | Stop reason: HOSPADM

## 2024-09-26 RX ORDER — OXYCODONE HYDROCHLORIDE 5 MG/1
5 TABLET ORAL EVERY 6 HOURS PRN
Status: DISCONTINUED | OUTPATIENT
Start: 2024-09-26 | End: 2024-09-28 | Stop reason: HOSPADM

## 2024-09-26 ASSESSMENT — COGNITIVE AND FUNCTIONAL STATUS - GENERAL
DRESSING REGULAR LOWER BODY CLOTHING: A LITTLE
HELP NEEDED FOR BATHING: A LITTLE
MOBILITY SCORE: 19
DAILY ACTIVITIY SCORE: 23
CLIMB 3 TO 5 STEPS WITH RAILING: A LITTLE
TOILETING: A LITTLE
TURNING FROM BACK TO SIDE WHILE IN FLAT BAD: A LITTLE
DAILY ACTIVITIY SCORE: 20
MOVING TO AND FROM BED TO CHAIR: A LITTLE
TURNING FROM BACK TO SIDE WHILE IN FLAT BAD: A LITTLE
MOVING TO AND FROM BED TO CHAIR: A LITTLE
HELP NEEDED FOR BATHING: A LITTLE
CLIMB 3 TO 5 STEPS WITH RAILING: A LITTLE
WALKING IN HOSPITAL ROOM: A LITTLE
MOVING FROM LYING ON BACK TO SITTING ON SIDE OF FLAT BED WITH BEDRAILS: A LITTLE
CLIMB 3 TO 5 STEPS WITH RAILING: A LITTLE
MOBILITY SCORE: 18
DRESSING REGULAR UPPER BODY CLOTHING: A LITTLE
MOVING FROM LYING ON BACK TO SITTING ON SIDE OF FLAT BED WITH BEDRAILS: A LITTLE
DRESSING REGULAR UPPER BODY CLOTHING: A LITTLE
WALKING IN HOSPITAL ROOM: A LITTLE
DAILY ACTIVITIY SCORE: 20
MOVING TO AND FROM BED TO CHAIR: A LITTLE
WALKING IN HOSPITAL ROOM: A LITTLE
MOVING FROM LYING ON BACK TO SITTING ON SIDE OF FLAT BED WITH BEDRAILS: A LITTLE
DRESSING REGULAR LOWER BODY CLOTHING: A LITTLE
STANDING UP FROM CHAIR USING ARMS: A LITTLE
TOILETING: A LITTLE
MOBILITY SCORE: 19
TURNING FROM BACK TO SIDE WHILE IN FLAT BAD: A LITTLE
HELP NEEDED FOR BATHING: A LITTLE

## 2024-09-26 ASSESSMENT — ACTIVITIES OF DAILY LIVING (ADL): HOME_MANAGEMENT_TIME_ENTRY: 33

## 2024-09-26 ASSESSMENT — PAIN - FUNCTIONAL ASSESSMENT
PAIN_FUNCTIONAL_ASSESSMENT: 0-10
PAIN_FUNCTIONAL_ASSESSMENT: 0-10

## 2024-09-26 ASSESSMENT — PAIN SCALES - WONG BAKER: WONGBAKER_NUMERICALRESPONSE: HURTS LITTLE BIT

## 2024-09-26 ASSESSMENT — PAIN SCALES - GENERAL
PAINLEVEL_OUTOF10: 4
PAINLEVEL_OUTOF10: 3

## 2024-09-26 ASSESSMENT — PAIN DESCRIPTION - DESCRIPTORS: DESCRIPTORS: PRESSURE

## 2024-09-26 NOTE — PROGRESS NOTES
Rounding DANIEL/Cardiologist:  Omkar Newman DO, Dr. Omkar Newman  Primary Cardiologist: Dr. Kip Birmingham    Date:  9/26/2024  Patient:  Yuri Reynolds  YOB: 1960  MRN:  [id number]   Admit Date:  9/23/2024      SUBJECTIVE:    Yuri Reynolds was seen and examined today at bedside on the telemetry unit    Denies any worsening shortness of breath or chest pain.    Remains in normal sinus rhythm.    Fluid balance -2800        VITALS:     Vitals:    09/25/24 2359 09/26/24 0404 09/26/24 0544 09/26/24 0734   BP: 136/65 135/65  123/58   BP Location: Left arm Left arm  Left arm   Patient Position: Lying Lying  Sitting   Pulse:    97   Resp: 18 18  18   Temp: 36.1 °C (97 °F) 36.4 °C (97.5 °F)  36.4 °C (97.5 °F)   TempSrc: Temporal Temporal  Temporal   SpO2: 98% 98%  96%   Weight:   106 kg (234 lb 9.1 oz)    Height:           Intake/Output Summary (Last 24 hours) at 9/26/2024 1011  Last data filed at 9/26/2024 0500  Gross per 24 hour   Intake 257 ml   Output 2850 ml   Net -2593 ml       Wt Readings from Last 4 Encounters:   09/26/24 106 kg (234 lb 9.1 oz)   09/16/24 108 kg (237 lb 7 oz)   09/11/24 106 kg (233 lb 1.6 oz)   09/03/24 108 kg (237 lb)       CURRENT HOSPITAL MEDICATIONS:   albumin human, 25 g, intravenous, Once  aspirin, 81 mg, oral, Daily  aspirin, 150 mg, rectal, Once  colchicine, 0.6 mg, oral, Daily  docusate sodium, 100 mg, oral, TID  insulin lispro, 0-15 Units, subcutaneous, q4h  lactated Ringer's, 500 mL, intravenous, Once  lidocaine, 1 patch, transdermal, q24h  metoclopramide, 10 mg, oral, q6h URI  metoprolol tartrate, 50 mg, oral, BID  mupirocin, 1 Application, Each Nostril, BID  oxygen, , inhalation, Continuous - Inhalation  polyethylene glycol, 17 g, oral, BID  rosuvastatin, 40 mg, oral, Daily  simethicone, 80 mg, oral, 4x daily         Current Outpatient Medications   Medication Instructions    albuterol 90 mcg/actuation inhaler 2 puffs, inhalation, Every 6  hours PRN    apixaban (ELIQUIS) 5 mg, oral, 2 times daily    aspirin 81 mg EC tablet 1 tablet, oral, Daily    isosorbide mononitrate ER (IMDUR) 120 mg, oral, Daily, Do not crush or chew.    lisinopril 10 mg, oral, Daily    metFORMIN XR (GLUCOPHAGE-XR) 500 mg, oral, Daily with evening meal    metoprolol succinate XL (TOPROL-XL) 50 mg, oral, Daily    nitroglycerin (NITROSTAT) 0.4 mg, sublingual, Every 5 min PRN, May repeat dose every 5 minutes for up to 3 doses total.    rosuvastatin (CRESTOR) 40 mg, oral, Daily    triamcinolone (Kenalog) 0.1 % cream 1 Application, Topical, 2 times daily PRN, Monday - Friday only        PHYSICAL EXAMINATION:     Physical Exam  HENT:      Head: Normocephalic.      Mouth/Throat:      Mouth: Mucous membranes are moist.   Eyes:      Pupils: Pupils are equal, round, and reactive to light.   Cardiovascular:      Rate and Rhythm: Normal rate and regular rhythm.      Heart sounds: Normal heart sounds.   Pulmonary:      Breath sounds: Decreased air movement present.      Comments: Poor inspiratory effort  Chest:      Comments: Midline incision clean dry and intact  Abdominal:      General: Bowel sounds are normal.      Palpations: Abdomen is soft.   Musculoskeletal:         General: Normal range of motion.      Right lower leg: No edema.      Left lower leg: No edema.   Skin:     General: Skin is warm and dry.      Capillary Refill: Capillary refill takes less than 2 seconds.   Neurological:      Mental Status: He is alert and oriented to person, place, and time.   Psychiatric:         Mood and Affect: Mood normal.         LAB DATA:     CBC:   Results from last 7 days   Lab Units 09/26/24  0436 09/25/24  0308 09/24/24  0330   WBC AUTO x10*3/uL 6.6 6.8 7.7   RBC AUTO x10*6/uL 3.59* 3.48* 3.76*   HEMOGLOBIN g/dL 10.2* 9.9* 10.7*   HEMATOCRIT % 29.9* 29.3* 31.1*   MCV fL 83 84 83   MCH pg 28.4 28.4 28.5   MCHC g/dL 34.1 33.8 34.4   RDW % 14.3 14.6* 14.6*   PLATELETS AUTO x10*3/uL 56* 52* 84*      CMP:    Results from last 7 days   Lab Units 09/26/24  0436 09/25/24  0308 09/24/24  0330 09/23/24  1604 09/20/24  1519   SODIUM mmol/L 135* 134* 138   < > 134*   POTASSIUM mmol/L 4.2 3.7 4.1   < > 4.2   CHLORIDE mmol/L 100 102 108*   < > 103   CO2 mmol/L 26 28 24   < > 24   BUN mg/dL 19 18 15   < > 20   CREATININE mg/dL 0.80 0.79 0.87   < > 0.97   GLUCOSE mg/dL 113* 116* 151*   < > 113*   PROTEIN TOTAL g/dL  --   --   --   --  7.4  7.4   CALCIUM mg/dL 8.9 8.6 8.5*   < > 9.9   BILIRUBIN TOTAL mg/dL  --   --   --   --  1.0  1.0   ALK PHOS U/L  --   --   --   --  79  75   AST U/L  --   --   --   --  23  23   ALT U/L  --   --   --   --  30  29    < > = values in this interval not displayed.     BMP:    Results from last 7 days   Lab Units 09/26/24  0436 09/25/24  0308 09/24/24  0330   SODIUM mmol/L 135* 134* 138   POTASSIUM mmol/L 4.2 3.7 4.1   CHLORIDE mmol/L 100 102 108*   CO2 mmol/L 26 28 24   BUN mg/dL 19 18 15   CREATININE mg/dL 0.80 0.79 0.87   CALCIUM mg/dL 8.9 8.6 8.5*   GLUCOSE mg/dL 113* 116* 151*     Magnesium:  Results from last 7 days   Lab Units 09/26/24  0436 09/25/24  0308 09/24/24  0330   MAGNESIUM mg/dL 2.11 2.00 2.11     Troponin:      BNP:     Lipid Panel:         DIAGNOSTIC TESTING:   @No results found for this or any previous visit.    ECG 12 lead    Result Date: 9/24/2024  Normal sinus rhythm Possible Inferior infarct , age undetermined Abnormal ECG When compared with ECG of 24-SEP-2024 08:30, (unconfirmed) Borderline criteria for Inferior infarct are now Present    XR chest 1 view    Result Date: 9/24/2024  Interpreted By:  Sagar Colón, STUDY: XR CHEST 1 VIEW;  9/24/2024 11:17 am   INDICATION: Signs/Symptoms:chest tube removal.     COMPARISON: Portable chest, earlier same day 24 September 2020 at 0504 hours   ACCESSION NUMBER(S): [id number]   ORDERING CLINICIAN: JESSI AGUILERA   TECHNIQUE: Single frontal view of the chest; Portable technique   FINDINGS: Left basilar chest tube has  been removed   No pneumothorax or any other interval change otherwise       As above   MACRO: None   Signed by: Sagar Colón 9/24/2024 11:22 AM Dictation workstation:   [id number]    XR chest 1 view    Result Date: 9/24/2024  Interpreted By:  Sherwin Goldstein, STUDY: XR CHEST 1 VIEW;  9/23/2024 8:50 am   INDICATION: Signs/Symptoms:post line insertion; RN to call when ready.   COMPARISON: None.   ACCESSION NUMBER(S): [id number]   ORDERING CLINICIAN: SANTO COLEMAN   FINDINGS: CHEST/LUNGS: The cardiac and mediastinal silhouettes are within normal limits for the technique. No focal areas of consolidation are noted. No pneumothorax is seen.   Right-sided [place]-Usman catheter is in place with the tip terminating in the expected location of the proximal aspect of the main pulmonary trunk. Mild blunting of the right costophrenic angle may relate to a trace effusion or pleural thickening.   UPPER ABDOMEN: No remarkable upper abdominal findings.   OSSEOUS STRUCTURES: No acute changes.       C[place]-Usman catheter tip overlies the proximal aspect of the main pulmonary trunk. Blunting of the right costophrenic angle which may relate to a trace effusion or pleural thickening.   MACRO: None.   Signed by: Sherwin Goldstein 9/24/2024 8:59 AM Dictation workstation:   [id number]    XR chest 1 view    Result Date: 9/24/2024  Interpreted By:  Nacho Serrano, STUDY: XR CHEST 1 VIEW;  9/24/2024 5:42 am   INDICATION: Signs/Symptoms:Post op cardiac surgery.     COMPARISON: 09/23/2024.   ACCESSION NUMBER(S): [id number]   ORDERING CLINICIAN: PAULA URBAN   FINDINGS: CARDIOMEDIASTINAL SILHOUETTE: Right jugular line remains in place with tubing tip at the SVC level. Cardiomegaly, aortic prominence and postoperative changes of the mediastinum are unchanged. Cephalad directed midline probable mediastinal drain remains in place. Endotracheal tube and NG tube are no longer seen.   LUNGS: Left basilar chest tube is unchanged in position. Left retrocardiac  and basilar consolidation with volume loss is again seen. Small left effusion not excluded. Mild right basilar atelectasis is present. No appreciable pneumothorax.   ABDOMEN: No remarkable upper abdominal findings.   BONES: Bones are stable.       1.  Persistent consolidation and volume loss in the left retrocardiac region and left base. Small left effusion not excluded.       MACRO: None.   Signed by: Nacho Serrano 9/24/2024 8:39 AM Dictation workstation:   [id number]    ECG 12 Lead    Result Date: 9/24/2024  Normal sinus rhythm ST elevation, consider early repolarization, pericarditis, or injury Abnormal ECG When compared with ECG of 23-SEP-2024 16:20, (unconfirmed) FL interval has decreased ST elevation now present in Lateral leads    XR chest 1 view    Result Date: 9/23/2024  Interpreted By:  Nacho Olvera, STUDY: XR CHEST 1 VIEW;  9/23/2024 4:19 pm   INDICATION: Signs/Symptoms:Post op cardiac surgery.     COMPARISON: 09/23/2024   ACCESSION NUMBER(S): [id number]   ORDERING CLINICIAN: PAULA URBAN   FINDINGS: AP radiograph of the chest was provided.   Stable right-sided [place]-Usman catheter noted. There has been interval placement of endotracheal tube noted ending 4 cm above the roldan. There is a nasogastric tube noted coursing inferior to the diaphragm in satisfactory position.   CARDIOMEDIASTINAL SILHOUETTE: Cardiomediastinal silhouette is normal in size and configuration.   LUNGS: Interval development of region of airspace consolidation noted in the left mid lung field. There is a small left-sided pleural effusion.   ABDOMEN: No remarkable upper abdominal findings.   BONES: No acute osseous changes. There are sternotomy wires noted.       1.  Stable right-sided [place]-Usman catheter noted. There has been interval placement of endotracheal tube noted ending 4 cm above the roldan. There is a nasogastric tube noted coursing inferior to the diaphragm in satisfactory position. 2. Interval development of  region of airspace consolidation noted in the left mid lung field. There is a small left-sided pleural effusion.         MACRO: None   Signed by: Nacho Olvera 9/23/2024 4:25 PM Dictation workstation:   [id number]    ECG 12 Lead    Result Date: 9/23/2024  Sinus rhythm with 1st degree AV block Low voltage QRS Borderline ECG When compared with ECG of 30-AUG-2024 11:42, T wave amplitude has decreased in Anterior leads       XR abdomen 1 view   Final Result   As above        MACRO:   None        Signed by: Sagar Colón 9/25/2024 12:06 PM   Dictation workstation:   [id number]      XR chest 1 view   Final Result   As above        MACRO:   None        Signed by: Sagar Colón 9/24/2024 11:22 AM   Dictation workstation:   [id number]      XR chest 1 view   Final Result   1.  Persistent consolidation and volume loss in the left retrocardiac   region and left base. Small left effusion not excluded.                  MACRO:   None.        Signed by: Nacho Serrano 9/24/2024 8:39 AM   Dictation workstation:   [id number]      XR chest 1 view   Final Result   1.  Stable right-sided [place]-Usman catheter noted. There has been   interval placement of endotracheal tube noted ending 4 cm above the   roldan. There is a nasogastric tube noted coursing inferior to the   diaphragm in satisfactory position.   2. Interval development of region of airspace consolidation noted in   the left mid lung field. There is a small left-sided pleural effusion.                       MACRO:   None        Signed by: Nacho Olvera 9/23/2024 4:25 PM   Dictation workstation:   [id number]      XR chest 1 view   Final Result   [place]-Usman catheter tip overlies the proximal aspect of the main   pulmonary trunk. Blunting of the right costophrenic angle which may   relate to a trace effusion or pleural thickening.        MACRO:   None.        Signed by: Sherwin Goldstein 9/24/2024 8:59 AM   Dictation workstation:   [id number]      Anesthesia Intraoperative  Transesophageal Echocardiogram    (Results Pending)         RADIOLOGY:     XR abdomen 1 view   Final Result   As above        MACRO:   None        Signed by: Sagar Colón 9/25/2024 12:06 PM   Dictation workstation:   [id number]      XR chest 1 view   Final Result   As above        MACRO:   None        Signed by: Sagar Colón 9/24/2024 11:22 AM   Dictation workstation:   [id number]      XR chest 1 view   Final Result   1.  Persistent consolidation and volume loss in the left retrocardiac   region and left base. Small left effusion not excluded.                  MACRO:   None.        Signed by: Nacho Serrano 9/24/2024 8:39 AM   Dictation workstation:   [id number]      XR chest 1 view   Final Result   1.  Stable right-sided [place]-Usman catheter noted. There has been   interval placement of endotracheal tube noted ending 4 cm above the   roldan. There is a nasogastric tube noted coursing inferior to the   diaphragm in satisfactory position.   2. Interval development of region of airspace consolidation noted in   the left mid lung field. There is a small left-sided pleural effusion.                       MACRO:   None        Signed by: Nacho Olvera 9/23/2024 4:25 PM   Dictation workstation:   [id number]      XR chest 1 view   Final Result   [place]-Usman catheter tip overlies the proximal aspect of the main   pulmonary trunk. Blunting of the right costophrenic angle which may   relate to a trace effusion or pleural thickening.        MACRO:   None.        Signed by: Sherwin Goldstein 9/24/2024 8:59 AM   Dictation workstation:   [id number]      Anesthesia Intraoperative Transesophageal Echocardiogram    (Results Pending)       PROBLEM LIST     Patient Active Problem List   Diagnosis    Abnormal LFTs    Coronary artery disease involving native coronary artery of native heart with angina pectoris (CMS-HCC)    Bilateral sensorineural hearing loss    Chronic nasal congestion    Cirrhosis (Multi)    Cough    Dizziness     Elevated serum creatinine    Eustachian tube dysfunction    Fatty liver    Hearing loss, bilateral    History of PTCA    Hyperlipidemia    Hypersplenism    Hypertension    Inability to maintain erection    Insomnia    Memory changes    Nicotine dependence    Palpitations    Past myocardial infarction    Retained myringotomy tube    Small bowel obstruction (Multi)    Somnolence, daytime    Thrombocytopenia (CMS-HCC)    Type 2 diabetes mellitus without complication, without long-term current use of insulin (Multi)    History of alcohol abuse    Class 1 obesity with serious comorbidity and body mass index (BMI) of 33.0 to 33.9 in adult    Abnormal finding in urine    Alcohol abuse    Anal discharge    Bursitis of elbow    Difficulty breathing    Dysfunction of both eustachian tubes    History of cardiac catheterization    History of ear disorder    History of pulmonary embolism    Inflammatory dermatosis    Lateral epicondylitis of both elbows    Portal hypertension (Multi)    Tinnitus, right ear    Pulmonary embolism without acute cor pulmonale, unspecified chronicity, unspecified pulmonary embolism type (Multi)    Chest pain    Angina pectoris, unstable (Multi)    Coronary artery disease of native artery of native heart with stable angina pectoris (CMS-HCC)    Chest pain, unspecified type       ASSESSMENT:   64-year-old gentleman seen evaluate the bedside postop day 3 in conjunction with Yaya Pereira RN, CNP.     Bedside examination evaluation performed by me.     Chart reviewed in detail discussed with the patient and staff.     Impression:  CAD with CABG x 3 (LIMA-LAD, RA-OM, SVG-PDA)   Hypertension  Hyperlipidemia  Former nicotine abuse  Former alcohol abuse with cirrhosis  History of PE        PLAN:   Recommendation:  Postop day 3.  Transferred to telemetry floor.  Remains in chair.  Telemetry shows normal sinus rhythm with no ectopy.    Normal sinus rhythm with heart rates between 70 and 90.  Increase  beta-blocker to 50 mg twice daily  Supplemental O2  Monitor electrolytes, keep potassium greater than 4 and magnesium greater than 2  Postop pain control  Incentive spirometer and pulmonary toilet  Advance diet as tolerated  Aggressive PT/OT  Seems euvolemic.  Monitor strict I's and O's and daily weights  SSI  Will continue to follow along with you  Message sent to schedulers for follow-up with Dr. Birmingham  Expect discharge in the next 24 to 48 hours.    Omkar Newman DO,[facility]         [facility] Pereira [facility]  Adult Gerontology Acute Care Nurse Practitioner  [facility] [place]   [facility]  [phone]          Of note, this documentation is completed using the Dragon Dictation system (voice recognition software). There may be spelling and/or grammatical errors that were not corrected prior to final submission.    Please do not hesitate to call with questions.  Electronically signed by Omkar Newman DO, on 9/26/2024 at 10:11 AM

## 2024-09-26 NOTE — PROGRESS NOTES
Physical Therapy    Physical Therapy Treatment    Patient Name: Yuri Reynolds  MRN: [id number]  Today's Date: 9/26/2024  Time Calculation  Start Time: 1040  Stop Time: 1108  Time Calculation (min): 28 min     814/814-A    Assessment/Plan   PT Assessment  PT Assessment Results: Decreased mobility, Decreased strength, Pain  Rehab Prognosis: Good  Evaluation/Treatment Tolerance: Patient tolerated treatment well  Medical Staff Made Aware: Yes  Barriers to Participation: Comorbidities (endurance)  End of Session Communication: Bedside nurse  Assessment Comment: pt would benefit from continued therapy to improve functional mobility and safety  End of Session Patient Position: Alarm off, not on at start of session (per pt request,)  PT Plan  Inpatient/Swing Bed or Outpatient: Inpatient  Treatment/Interventions: Transfer training, Gait training, Therapeutic exercise, Therapeutic activity, Home exercise program  PT Plan: Ongoing PT  PT Frequency: 4 times per week  PT Discharge Recommendations: Low intensity level of continued care  Equipment Recommended upon Discharge: Wheeled walker, Straight cane   PT Recommended Transfer Status: Assist x1, Assistive device    General Visit Information:   PT  Visit  PT Received On: 09/26/24  General  Reason for Referral: S/P CABG x 3  Missed Visit: No  Family/Caregiver Present: Yes  Caregiver Feedback: spouse present and supportive  Prior to Session Communication: Bedside nurse (cleared to participate)  Patient Position Received: Bed, 4 rail up, Alarm off, not on at start of session (pt states he3 prefers all 4 rails up sinc he is a big angelina in a small bed)  General Comment: agreeable to particpate, states that he plans on going home upon discharge    General Observations:        Subjective     Precautions:  Precautions  Hearing/Visual Limitations: [place]  LE Weight Bearing Status: Weight Bearing as Tolerated  Medical Precautions: Fall precautions  Post-Surgical Precautions: Move in the  Tube  Precautions Comment: reviewed MITT precautions Pt able to recall and maintain    Vital Signs:  Vital Signs  Heart Rate: 85 (increaseing to 97 with gait , recovering once seated)  SpO2: 98 % (with 2 L O2 in place, decreased to 74% with gait)  Objective     Pain:  Pain Assessment  Pain Assessment: 0-10  0-10 (Numeric) Pain Score:  (reports pain in chest with certain movements. no numerical value given, states that he has been medicated)  Pain Type: Surgical pain  Pain Location: Chest  Pain Descriptors: Pressure  Pain Interventions: Ambulation/increased activity    Cognition:  Cognition  Overall Cognitive Status: Within Functional Limits    Activity Tolerance:  Activity Tolerance  Endurance: Endurance does not limit participation in activity (requires increased time to complete)    Treatments:  Therapeutic Exercise  Therapeutic Exercise Performed: No        Bed Mobility  Bed Mobility: Yes  Bed Mobility 1  Bed Mobility Comments 1: transfers supine <--> sit with CGA and vc for log rolling technique , with bed flat at height of bed at home  Ambulation/Gait Training  Ambulation/Gait Training Performed: Yes  Ambulation/Gait Training 1  Surface 1: Level tile  Device 1: Rolling walker  Comments/Distance (ft) 1: ambulating 125 ft x 2 with WW and SBA using very slow steady gait , pt puts minimal wt through WW using it mainly for balance (pt requesting WW for home stating it helps with energy conservation and his SOB)  Transfers  Transfer: Yes  Transfer 1  Technique 1: Sit to stand, Stand to sit  Trials/Comments 1: transfers sit <--> stand with Mod I  Stairs  Stairs: No       Outcome Measures:     [facility] Basic Mobility  Turning from your back to your side while in a flat bed without using bedrails: A little  Moving from lying on your back to sitting on the side of a flat bed without using bedrails: A little  Moving to and from bed to chair (including a wheelchair): A little  Standing up from a chair using your arms (e.g.  wheelchair or bedside chair): A little  To walk in hospital room: A little  Climbing 3-5 steps with railing: A little  Basic Mobility - Total Score: 18             EDUCATION:    Individual(s) Educated: Patient  Education Provided: Fall Risk, Home Exercise Program, Home Safety  Patient Response to Education: Patient/Caregiver Verbalized Understanding of Information    Encounter Problems       Encounter Problems (Active)       PT Problem       Pt will demonstrate mod I  with bed mobility to edge of bed.   (Met)       Start:  09/24/24    Expected End:  10/08/24    Resolved:  09/26/24         Pt will demonstrate mod I with sit to stand/chair transfers with least restrictive device.   (Progressing)       Start:  09/24/24    Expected End:  10/08/24            Pt will ambulate 150 feet with mod I  least restrictive device . ' (Progressing)       Start:  09/24/24    Expected End:  10/08/24            Pt to demo 12 steps with  rails with SBA  (Progressing)       Start:  09/24/24    Expected End:  10/08/24               Pain - Adult

## 2024-09-26 NOTE — CARE PLAN
Problem: Skin  Goal: Decreased wound size/increased tissue granulation at next dressing change  Outcome: Progressing  Goal: Participates in plan/prevention/treatment measures  Outcome: Progressing  Goal: Prevent/manage excess moisture  Outcome: Progressing  Goal: Prevent/minimize sheer/friction injuries  Outcome: Progressing  Goal: Promote/optimize nutrition  Outcome: Progressing  Goal: Promote skin healing  Outcome: Progressing     Problem: Pain - Adult  Goal: Verbalizes/displays adequate comfort level or baseline comfort level  Outcome: Progressing     Problem: Safety - Adult  Goal: Free from fall injury  Outcome: Progressing     Problem: Discharge Planning  Goal: Discharge to home or other facility with appropriate resources  Outcome: Progressing     Problem: Chronic Conditions and Co-morbidities  Goal: Patient's chronic conditions and co-morbidity symptoms are monitored and maintained or improved  Outcome: Progressing     Problem: Pain  Goal: Takes deep breaths with improved pain control throughout the shift  Outcome: Progressing  Goal: Turns in bed with improved pain control throughout the shift  Outcome: Progressing  Goal: Walks with improved pain control throughout the shift  Outcome: Progressing  Goal: Performs ADL's with improved pain control throughout shift  Outcome: Progressing  Goal: Participates in PT with improved pain control throughout the shift  Outcome: Progressing  Goal: Free from opioid side effects throughout the shift  Outcome: Progressing  Goal: Free from acute confusion related to pain meds throughout the shift  Outcome: Progressing     Problem: Indwelling Catheter Maintenance  Goal: I will have no complications from indwelling catheter  Outcome: Progressing     Problem: Resident is recovering from cardiovascular surgery  Goal: I will maintain and build strength.  Outcome: Progressing  Goal: I will decrease complications and risks after surgery  Outcome: Progressing   The patient's goals  for the shift include      The clinical goals for the shift include pt will remain hemodynamically stable thorughout shift

## 2024-09-26 NOTE — PROGRESS NOTES
Occupational Therapy    OT Treatment    Patient Name: Yuri Reynolds  MRN: [id number]  Department: [facility]  Room: [address] [place] [address]  Today's Date: 9/26/2024  Time Calculation  Start Time: 1202  Stop Time: 1235  Time Calculation (min): 33 min      Assessment:  Pt demo good progress toward goals today; completed BADL and safe functional mobility at modified independent level; increased time with rest breaks required; pt edu in ECT/WS techniques and receptive to education. No further skilled OT at this time; pt verbalizes confidence returning home and resuming ADLs.   Evaluation/Treatment Tolerance: Patient tolerated treatment well  Medical Staff Made Aware: Yes  End of Session Communication: Bedside nurse  End of Session Patient Position: Up in chair, Alarm off, not on at start of session    Plan:  OT treatment / education completed. No further skilled OT indicated at this time. OT goals met. POT - OK to Discharge: Yes (Once medically appropriate.)    Subjective   Previous Visit Info:  OT Last Visit  OT Received On: 09/26/24  General:  General  Reason for Referral: Decline in self care performance s/p cardiac surgery  Referred By: Emmett NICOLE  Past Medical History Relevant to Rehab: HLD, HTN, MI, CAD, DM, ETOH abuse, SBO, Cirrhosis, PE  Family/Caregiver Present: No  Patient Position Received: Bed, 3 rail up, Alarm off, not on at start of session  General Comment: Pt is pleasant and cooperative agreeable to OT services.  Precautions:  Hearing/Visual Limitations: [place]  LE Weight Bearing Status: Weight Bearing as Tolerated  Medical Precautions: Fall precautions, Cardiac precautions  Post-Surgical Precautions: Move in the Tube    Vital Signs (Past 2hrs)        Date/Time Vitals Session  Pulse Resp SpO2 BP MAP (mmHg)    09/26/24 1202 Pre OT   84  --  97 %  --  --      During OT    94-96%                  PreOT: 2 LO2 via nc; During OT on room air with ADLs and functional transfers / mobility; nursing okayed to keep O2 off patient  post OT tx           Pain:  Pain Assessment  Pain Assessment: 0-10  0-10 (Numeric) Pain Score: 4  Pain Type: Surgical pain  Pain Location: Chest    Pain Limitation  ADLs/IADLs limited due to pain  Functional mobility limited due to pain    If pain is limiting:  Educated patient on positioning to reduce pain  Educated patient on rest/activity to address increase in pain  Adjusted / adapted ADLs/IADLs to reduce pain with these activities  Patient trained for proper mobility/transfer techniques to decrease pain    Objective    Cognition:  Cognition  Overall Cognitive Status: Within Functional Limits     Activities of Daily Living: Grooming  Grooming Level of Assistance: Modified independent     LE Dressing  LE Dressing: Yes  Pants Level of Assistance: Modified independent  Sock Level of Assistance: Modified independent  LE Dressing Where Assessed: Edge of bed  LE Dressing Comments: fair reach to LEs; mild SOB; edu on benefits of AE for ECT/WS.    Toileting  Toileting Level of Assistance: Modified independent     Bed Mobility/Transfers: Bed Mobility 1  Bed Mobility 1: Supine to sitting  Level of Assistance 1: Modified independent    Transfer 1  Technique 1: Sit to stand, Stand to sit  Transfer Level of Assistance 1: Modified independent    Toilet Transfers  Toilet Transfer to: Raised toilet seat without rails  Toilet Transfer Technique: Ambulating  Toilet Transfers: Modified independence    Functional Mobility:  Functional Mobility  Functional Mobility Performed: Yes  Functional Mobility 1  Device 1: No device  Assistance 1: Close supervision    Standing Balance:   Fair + with ADL task completion    Skilled BADL Treatment / Intervention  BADL process / adaptation training with MITT precautions  Safety deficit modifications  Compensatory training  Energy conservation  Environmental modifications    Progress in BADL Status  Improvement noted  Modified independence level  Use of compensatory strategies  Good application of  MITT precautions with ADL and functional transfers     Outcome Measures:[facility] Daily Activity  Putting on and taking off regular lower body clothing: None  Bathing (including washing, rinsing, drying): A little  Putting on and taking off regular upper body clothing: None  Toileting, which includes using toilet, bedpan or urinal: None  Taking care of personal grooming such as brushing teeth: None  Eating Meals: None  Daily Activity - Total Score: 23    Education Documentation  ADL Training, taught by Sameera Goldsmith OT at 9/26/2024  1:48 PM.  Learner: Patient  Readiness: Acceptance  Method: Explanation, Demonstration  Response: Verbalizes Understanding, Demonstrated Understanding    Precautions, taught by Sameera Goldsmith OT at 9/26/2024  1:48 PM.  Learner: Patient  Readiness: Acceptance  Method: Explanation, Demonstration  Response: Verbalizes Understanding, Demonstrated Understanding    Goals:  Encounter Problems       Encounter Problems (Resolved)       OT Goals       Patient will complete household distance functional mobility at a mod I level.  (Met)       Start:  09/24/24    Expected End:  10/08/24    Resolved:  09/26/24         Patient will complete functional transfers at a mod I level.  (Met)       Start:  09/24/24    Expected End:  10/08/24    Resolved:  09/26/24         Patient will complete lower body dressing at a mod I level.  (Met)       Start:  09/24/24    Expected End:  10/08/24    Resolved:  09/26/24         Patient will complete toileting at a mod I level.  (Met)       Start:  09/24/24    Expected End:  10/08/24    Resolved:  09/26/24         Patient will demonstrate fair + dynamic standing balance during functional tasks. (Met)       Start:  09/24/24    Expected End:  10/08/24    Resolved:  09/26/24

## 2024-09-26 NOTE — PROGRESS NOTES
Yuri Reynolds is a 64 y.o. male on day 3 of admission presenting with Coronary artery disease of native artery of native heart with stable angina pectoris.    Subjective   No acute overnight events. This morning is afebrile, maintaining sinus rhythm, normotensive with systolic blood pressure 110-130.  Chest x-ray is stable, maintaining adequate saturations on 2 L nasal cannula.  Spirometry volumes improving to 1200 mL. Responded well to diuresis yesterday with -2.8 L output.   Hold off on diuresis today. Postoperative pain is well-controlled. Did receive one time dose of oxycodone for sternum pain after ambulating. KUB showing no evidence of ileus, mild gaseous dilation of the transverse colon, stool is in the right colon, gas is in normal caliber left, sigmoid colon and rectum, only scattered small bowel gas, none in dilated loops. Will advance diet as tolerated. States he is passing flatus. IV acetaminophen and tramadol added as alternative for pain management.  Increase activity as tolerated. PT/OT following. Tentative discharge 24-48 hours if remains stable.     Objective     Physical Exam  Vitals and nursing note reviewed.   Constitutional:       General: He is not in acute distress.  HENT:      Head: Normocephalic and atraumatic.      Nose: Nose normal.      Mouth/Throat:      Mouth: Mucous membranes are moist.   Eyes:      Pupils: Pupils are equal, round, and reactive to light. Pupils are equal.   Neck:      Comments: RIJ Cordis removed   Cardiovascular:      Rate and Rhythm: Normal rate and regular rhythm.      Pulses: Normal pulses.      Heart sounds:      Friction rub present.      Comments: Ventricular wires insulated  Pulmonary:      Comments: Improving inspiratory effort  Spirometry volumes 1200 mL  Sternum stable, sternotomy well-approximated  Chest tube sutures intact no erythema or drainage    Abdominal:      General: Abdomen is protuberant. Bowel sounds are decreased.      Comments: Abdomen slightly  distended with hypoactive bowel sounds.   + passing flatus  KUB (-)    Genitourinary:     Comments: Harris draining clear yellow, to be removed today  Musculoskeletal:      Cervical back: Normal range of motion.      Comments: Trace generalized edema   Skin:     General: Skin is warm and dry.      Comments: Left radial harvest site ecchymotic, well-approximated without erythema  Right EVH incisions well-approximated, diffuse surrounding ecchymosis   Neurological:      General: No focal deficit present.      Mental Status: He is alert and oriented to person, place, and time.   Psychiatric:         Mood and Affect: Mood normal.         Behavior: Behavior normal. Behavior is cooperative.         Last Recorded Vitals  Blood pressure 116/63, pulse 85, temperature 36.5 °C (97.7 °F), temperature source Temporal, resp. rate 18, height 1.829 m (6'), weight 106 kg (234 lb 9.1 oz), SpO2 95%.  Intake/Output last 3 Shifts:  I/O last 3 completed shifts:  In: 649 (6.1 mL/kg) [I.V.:192 (1.8 mL/kg); IV Piggyback:457]  Out: 4200 (39.5 mL/kg) [Urine:4200 (1.1 mL/kg/hr)]  Weight: 106.4 kg     Relevant Results  Scheduled medications  albumin human, 25 g, intravenous, Once  aspirin, 81 mg, oral, Daily  aspirin, 150 mg, rectal, Once  colchicine, 0.6 mg, oral, Daily  docusate sodium, 100 mg, oral, TID  insulin lispro, 0-15 Units, subcutaneous, q4h  lactated Ringer's, 500 mL, intravenous, Once  lidocaine, 1 patch, transdermal, q24h  metoclopramide, 10 mg, oral, q6h URI  metoprolol tartrate, 50 mg, oral, BID  mupirocin, 1 Application, Each Nostril, BID  oxygen, , inhalation, Continuous - Inhalation  polyethylene glycol, 17 g, oral, BID  rosuvastatin, 40 mg, oral, Daily  simethicone, 80 mg, oral, 4x daily      Continuous medications     PRN medications  PRN medications: albuterol, alteplase, bisacodyl, calcium chloride, calcium chloride, dextrose **OR** glucagon, magnesium sulfate, magnesium sulfate, naloxone, ondansetron, oxyCODONE, potassium  chloride, potassium chloride, traMADol      Results for orders placed or performed during the hospital encounter of 09/23/24 (from the past 24 hour(s))   POCT GLUCOSE   Result Value Ref Range    POCT Glucose 114 (H) 74 - 99 mg/dL   POCT GLUCOSE   Result Value Ref Range    POCT Glucose 144 (H) 74 - 99 mg/dL   POCT GLUCOSE   Result Value Ref Range    POCT Glucose 118 (H) 74 - 99 mg/dL   POCT GLUCOSE   Result Value Ref Range    POCT Glucose 129 (H) 74 - 99 mg/dL   POCT GLUCOSE   Result Value Ref Range    POCT Glucose 116 (H) 74 - 99 mg/dL   SST TOP   Result Value Ref Range    Extra Tube Hold for add-ons.    CBC   Result Value Ref Range    WBC 6.6 4.4 - 11.3 x10*3/uL    nRBC 0.0 0.0 - 0.0 /100 WBCs    RBC 3.59 (L) 4.50 - 5.90 x10*6/uL    Hemoglobin 10.2 (L) 13.5 - 17.5 g/dL    Hematocrit 29.9 (L) 41.0 - 52.0 %    MCV 83 80 - 100 fL    MCH 28.4 26.0 - 34.0 pg    MCHC 34.1 32.0 - 36.0 g/dL    RDW 14.3 11.5 - 14.5 %    Platelets 56 (L) 150 - 450 x10*3/uL   Magnesium   Result Value Ref Range    Magnesium 2.11 1.60 - 2.40 mg/dL   Renal Function Panel   Result Value Ref Range    Glucose 113 (H) 74 - 99 mg/dL    Sodium 135 (L) 136 - 145 mmol/L    Potassium 4.2 3.5 - 5.3 mmol/L    Chloride 100 98 - 107 mmol/L    Bicarbonate 26 21 - 32 mmol/L    Anion Gap 13 10 - 20 mmol/L    Urea Nitrogen 19 6 - 23 mg/dL    Creatinine 0.80 0.50 - 1.30 mg/dL    eGFR >90 >60 mL/min/1.73m*2    Calcium 8.9 8.6 - 10.3 mg/dL    Phosphorus 2.3 (L) 2.5 - 4.9 mg/dL    Albumin 3.8 3.4 - 5.0 g/dL   POCT GLUCOSE   Result Value Ref Range    POCT Glucose 132 (H) 74 - 99 mg/dL   POCT GLUCOSE   Result Value Ref Range    POCT Glucose 118 (H) 74 - 99 mg/dL    This patient currently has cardiac telemetry ordered; if you would like to modify or discontinue the telemetry order, click here to go to the orders activity to modify/discontinue the order.      XR chest 1 view    Result Date: 9/26/2024  Interpreted By:  Sagar Colón, STUDY: XR CHEST 1 VIEW;  9/26/2024  11:58 am   INDICATION: Signs/Symptoms:s/p CABG.     COMPARISON: Portable chest 04/20/2024 at 1109 hours   ACCESSION NUMBER(S): [id number]   ORDERING CLINICIAN: VERONIQUE MEYER   TECHNIQUE: Single frontal view of the chest; Portable technique   FINDINGS: Right IJ line has been removed   No other interval change   The cardiomediastinal silhouette is unchanged   No consolidative lung opacity   No edema / failure   No large pleural effusion or demonstrable pneumothorax       No acute unexpected findings after cardiac surgery and more recent removal of the right IJ line   MACRO: None   Signed by: Sagar Colón 9/26/2024 12:04 PM Dictation workstation:   [id number]    XR abdomen 1 view    Result Date: 9/25/2024  Interpreted By:  Sagar Colón, STUDY: XR ABDOMEN 1 VIEW;  9/25/2024 10:36 am   INDICATION: Signs/Symptoms:R/O post operative ileus.     COMPARISON: KUB 3 March 2019   ACCESSION NUMBER(S): [id number]   ORDERING CLINICIAN: JESSI AGUILERA   TECHNIQUE: Frontal supine view of the abdomen   FINDINGS: Mild gaseous dilation of the transverse colon   Stool is in the right colon   Gas is in normal caliber left, sigmoid colon and rectum   Only scattered small bowel gas, none in dilated loops       As above   MACRO: None   Signed by: Sagar Colón 9/25/2024 12:06 PM Dictation workstation:   [id number]             Assessment/Plan   Assessment & Plan  Coronary artery disease of native artery of native heart with stable angina pectoris    Chest pain, unspecified type    Angina pectoris, unstable (Multi)    Coronary artery disease; status post CABG  Status post CABG x 3 with LIMA to LAD, radial graft to OM, and saphenous vein graft to PDA on 9/23.  -Stable to downgrade to telemetry  -Continue daily low-dose aspirin, statin  -RIJ cordis removed  -Metoprolol 25 mg twice daily  -Seems euvolemic will hold diureses today and reevaluate.   -Replete electrolytes to maintain potassium greater than 4.0, magnesium greater than  2.0  -DC IV fluids  -DVT prophylaxis with PADMA hose and SCDs, no chemoprophylaxis due to thrombocytopenia  -Advance diet as tolerated. Clear liquid diet until passing flatus  -KUB (-)   -Add simethicone and scheduled Reglan x 24 hours  -Bowel regimen with MiraLAX and Colace  -Prophylaxis with Protonix  -Tylenol and  tramadol 50 mg every 6 hours as needed added as alternative to narcotics  -Incentive spirometry, out of bed 3 times daily  -PT/OT following  -TCC for discharge planning   -Tentative discharge in 24-48 hours      Anemia; thrombocytopenia  Acute postoperative blood loss anemia secondary to hemodilution and consumption, chronic thrombocytopenia with baseline platelets ranging 60-80.  POD #1: Hemoglobin 10.7, platelet count 84K, no signs of active bleeding  POD #2: Hemoglobin 9.9, platelet count 52K, no signs of active bleeding  POD#3: Hemoglobin 10.2, platelet count 56K, no signs of active bleeding  -No chemoprophylaxis for DVT, PADMA hose and SCDs only  -Follow CBC     Cirrhosis  Chronic hepatic cirrhosis, LFTs stable       I spent 35 minutes in the professional and overall care of this patient.      Elida Hyman, APRN-CNP

## 2024-09-27 LAB
ALBUMIN SERPL BCP-MCNC: 3.8 G/DL (ref 3.4–5)
ANION GAP SERPL CALC-SCNC: 14 MMOL/L (ref 10–20)
ATRIAL RATE: 100 BPM
BLOOD EXPIRATION DATE: NORMAL
BUN SERPL-MCNC: 19 MG/DL (ref 6–23)
CALCIUM SERPL-MCNC: 9 MG/DL (ref 8.6–10.3)
CHLORIDE SERPL-SCNC: 102 MMOL/L (ref 98–107)
CO2 SERPL-SCNC: 21 MMOL/L (ref 21–32)
CREAT SERPL-MCNC: 0.7 MG/DL (ref 0.5–1.3)
DISPENSE STATUS: NORMAL
EGFRCR SERPLBLD CKD-EPI 2021: >90 ML/MIN/1.73M*2
ERYTHROCYTE [DISTWIDTH] IN BLOOD BY AUTOMATED COUNT: 14.7 % (ref 11.5–14.5)
GLUCOSE BLD MANUAL STRIP-MCNC: 100 MG/DL (ref 74–99)
GLUCOSE BLD MANUAL STRIP-MCNC: 114 MG/DL (ref 74–99)
GLUCOSE BLD MANUAL STRIP-MCNC: 115 MG/DL (ref 74–99)
GLUCOSE BLD MANUAL STRIP-MCNC: 116 MG/DL (ref 74–99)
GLUCOSE BLD MANUAL STRIP-MCNC: 127 MG/DL (ref 74–99)
GLUCOSE BLD MANUAL STRIP-MCNC: 143 MG/DL (ref 74–99)
GLUCOSE SERPL-MCNC: 119 MG/DL (ref 74–99)
HCT VFR BLD AUTO: 31.4 % (ref 41–52)
HGB BLD-MCNC: 10.3 G/DL (ref 13.5–17.5)
MAGNESIUM SERPL-MCNC: 2.11 MG/DL (ref 1.6–2.4)
MCH RBC QN AUTO: 28.6 PG (ref 26–34)
MCHC RBC AUTO-ENTMCNC: 32.8 G/DL (ref 32–36)
MCV RBC AUTO: 87 FL (ref 80–100)
NRBC BLD-RTO: 0 /100 WBCS (ref 0–0)
P AXIS: 67 DEGREES
P OFFSET: 158 MS
P ONSET: 100 MS
PHOSPHATE SERPL-MCNC: 2.8 MG/DL (ref 2.5–4.9)
PLATELET # BLD AUTO: 76 X10*3/UL (ref 150–450)
POTASSIUM SERPL-SCNC: 4 MMOL/L (ref 3.5–5.3)
PR INTERVAL: 222 MS
PRODUCT BLOOD TYPE: 6200
PRODUCT CODE: NORMAL
Q ONSET: 211 MS
QRS COUNT: 17 BEATS
QRS DURATION: 84 MS
QT INTERVAL: 344 MS
QTC CALCULATION(BAZETT): 443 MS
QTC FREDERICIA: 408 MS
R AXIS: -1 DEGREES
RBC # BLD AUTO: 3.6 X10*6/UL (ref 4.5–5.9)
SODIUM SERPL-SCNC: 133 MMOL/L (ref 136–145)
T AXIS: 24 DEGREES
T OFFSET: 383 MS
UNIT ABO: NORMAL
UNIT NUMBER: NORMAL
UNIT RH: NORMAL
UNIT VOLUME: 350
VENTRICULAR RATE: 100 BPM
WBC # BLD AUTO: 6.5 X10*3/UL (ref 4.4–11.3)
XM INTEP: NORMAL

## 2024-09-27 PROCEDURE — 85027 COMPLETE CBC AUTOMATED: CPT | Performed by: NURSE PRACTITIONER

## 2024-09-27 PROCEDURE — 2500000002 HC RX 250 W HCPCS SELF ADMINISTERED DRUGS (ALT 637 FOR MEDICARE OP, ALT 636 FOR OP/ED)

## 2024-09-27 PROCEDURE — 2500000001 HC RX 250 WO HCPCS SELF ADMINISTERED DRUGS (ALT 637 FOR MEDICARE OP): Performed by: NURSE PRACTITIONER

## 2024-09-27 PROCEDURE — 83735 ASSAY OF MAGNESIUM: CPT | Performed by: NURSE PRACTITIONER

## 2024-09-27 PROCEDURE — 2500000001 HC RX 250 WO HCPCS SELF ADMINISTERED DRUGS (ALT 637 FOR MEDICARE OP): Performed by: PHARMACIST

## 2024-09-27 PROCEDURE — 99233 SBSQ HOSP IP/OBS HIGH 50: CPT | Performed by: NURSE PRACTITIONER

## 2024-09-27 PROCEDURE — 2500000002 HC RX 250 W HCPCS SELF ADMINISTERED DRUGS (ALT 637 FOR MEDICARE OP, ALT 636 FOR OP/ED): Performed by: NURSE PRACTITIONER

## 2024-09-27 PROCEDURE — 97530 THERAPEUTIC ACTIVITIES: CPT | Mod: GP,CQ

## 2024-09-27 PROCEDURE — 2500000001 HC RX 250 WO HCPCS SELF ADMINISTERED DRUGS (ALT 637 FOR MEDICARE OP)

## 2024-09-27 PROCEDURE — 97116 GAIT TRAINING THERAPY: CPT | Mod: GP,CQ

## 2024-09-27 PROCEDURE — 99233 SBSQ HOSP IP/OBS HIGH 50: CPT | Performed by: INTERNAL MEDICINE

## 2024-09-27 PROCEDURE — 2060000001 HC INTERMEDIATE ICU ROOM DAILY

## 2024-09-27 PROCEDURE — 82947 ASSAY GLUCOSE BLOOD QUANT: CPT

## 2024-09-27 PROCEDURE — 37799 UNLISTED PX VASCULAR SURGERY: CPT | Performed by: NURSE PRACTITIONER

## 2024-09-27 PROCEDURE — 80069 RENAL FUNCTION PANEL: CPT | Performed by: NURSE PRACTITIONER

## 2024-09-27 ASSESSMENT — COGNITIVE AND FUNCTIONAL STATUS - GENERAL
MOVING TO AND FROM BED TO CHAIR: A LITTLE
TOILETING: A LITTLE
CLIMB 3 TO 5 STEPS WITH RAILING: A LITTLE
MOVING TO AND FROM BED TO CHAIR: A LITTLE
HELP NEEDED FOR BATHING: A LITTLE
MOBILITY SCORE: 19
HELP NEEDED FOR BATHING: A LITTLE
MOVING FROM LYING ON BACK TO SITTING ON SIDE OF FLAT BED WITH BEDRAILS: A LITTLE
WALKING IN HOSPITAL ROOM: A LITTLE
MOBILITY SCORE: 19
MOBILITY SCORE: 19
DRESSING REGULAR LOWER BODY CLOTHING: A LITTLE
WALKING IN HOSPITAL ROOM: A LITTLE
DRESSING REGULAR UPPER BODY CLOTHING: A LITTLE
TURNING FROM BACK TO SIDE WHILE IN FLAT BAD: A LITTLE
MOVING TO AND FROM BED TO CHAIR: A LITTLE
CLIMB 3 TO 5 STEPS WITH RAILING: A LITTLE
TURNING FROM BACK TO SIDE WHILE IN FLAT BAD: A LITTLE
MOVING FROM LYING ON BACK TO SITTING ON SIDE OF FLAT BED WITH BEDRAILS: A LITTLE
TURNING FROM BACK TO SIDE WHILE IN FLAT BAD: A LITTLE
TOILETING: A LITTLE
DRESSING REGULAR UPPER BODY CLOTHING: A LITTLE
STANDING UP FROM CHAIR USING ARMS: A LITTLE
WALKING IN HOSPITAL ROOM: A LITTLE
DRESSING REGULAR LOWER BODY CLOTHING: A LITTLE
CLIMB 3 TO 5 STEPS WITH RAILING: A LITTLE
DAILY ACTIVITIY SCORE: 20
DAILY ACTIVITIY SCORE: 20

## 2024-09-27 ASSESSMENT — PAIN SCALES - GENERAL
PAINLEVEL_OUTOF10: 2
PAINLEVEL_OUTOF10: 4
PAINLEVEL_OUTOF10: 4

## 2024-09-27 ASSESSMENT — PAIN - FUNCTIONAL ASSESSMENT: PAIN_FUNCTIONAL_ASSESSMENT: 0-10

## 2024-09-27 ASSESSMENT — PAIN DESCRIPTION - ORIENTATION: ORIENTATION: LEFT

## 2024-09-27 ASSESSMENT — PAIN DESCRIPTION - LOCATION: LOCATION: CHEST

## 2024-09-27 ASSESSMENT — PAIN DESCRIPTION - DESCRIPTORS: DESCRIPTORS: PRESSURE

## 2024-09-27 NOTE — PROGRESS NOTES
Rounding DANIEL/Cardiologist:  Omkar Newman DO, Dr. Omkar Newman  Primary Cardiologist: Dr. Kip Birmingham    Date:  9/27/2024  Patient:  Yuri Reynolds  YOB: 1960  MRN:  [id number]   Admit Date:  9/23/2024      SUBJECTIVE:    Yuri Reynolds was seen and examined today at bedside on the telemetry unit    Denies any worsening shortness of breath or chest pain.    Remains in normal sinus rhythm.  Slightly tachycardic during ambulation.    Fluid balance -1370        VITALS:     Vitals:    09/27/24 0420 09/27/24 0600 09/27/24 0732 09/27/24 0814   BP: 129/66      BP Location: Right arm      Patient Position: Lying      Pulse: 97   109   Resp: 20      Temp: 36.4 °C (97.5 °F)      TempSrc: Temporal  Temporal    SpO2: 96%   95%   Weight:  100 kg (221 lb 1.9 oz)     Height:           Intake/Output Summary (Last 24 hours) at 9/27/2024 1035  Last data filed at 9/26/2024 2100  Gross per 24 hour   Intake --   Output 1370 ml   Net -1370 ml       Wt Readings from Last 4 Encounters:   09/27/24 100 kg (221 lb 1.9 oz)   09/16/24 108 kg (237 lb 7 oz)   09/11/24 106 kg (233 lb 1.6 oz)   09/03/24 108 kg (237 lb)       CURRENT HOSPITAL MEDICATIONS:   albumin human, 25 g, intravenous, Once  aspirin, 81 mg, oral, Daily  aspirin, 150 mg, rectal, Once  colchicine, 0.6 mg, oral, Daily  docusate sodium, 100 mg, oral, TID  insulin lispro, 0-15 Units, subcutaneous, q4h  lactated Ringer's, 500 mL, intravenous, Once  metoclopramide, 10 mg, oral, q6h URI  metoprolol tartrate, 50 mg, oral, BID  mupirocin, 1 Application, Each Nostril, BID  oxygen, , inhalation, Continuous - Inhalation  polyethylene glycol, 17 g, oral, BID  rosuvastatin, 40 mg, oral, Daily  simethicone, 80 mg, oral, 4x daily         Current Outpatient Medications   Medication Instructions    albuterol 90 mcg/actuation inhaler 2 puffs, inhalation, Every 6 hours PRN    apixaban (ELIQUIS) 5 mg, oral, 2 times daily    aspirin 81 mg EC tablet 1  tablet, oral, Daily    isosorbide mononitrate ER (IMDUR) 120 mg, oral, Daily, Do not crush or chew.    lisinopril 10 mg, oral, Daily    metFORMIN XR (GLUCOPHAGE-XR) 500 mg, oral, Daily with evening meal    metoprolol succinate XL (TOPROL-XL) 50 mg, oral, Daily    nitroglycerin (NITROSTAT) 0.4 mg, sublingual, Every 5 min PRN, May repeat dose every 5 minutes for up to 3 doses total.    rosuvastatin (CRESTOR) 40 mg, oral, Daily    triamcinolone (Kenalog) 0.1 % cream 1 Application, Topical, 2 times daily PRN, Monday - Friday only        PHYSICAL EXAMINATION:     Physical Exam  HENT:      Head: Normocephalic.      Mouth/Throat:      Mouth: Mucous membranes are moist.   Eyes:      Pupils: Pupils are equal, round, and reactive to light.   Cardiovascular:      Rate and Rhythm: Normal rate and regular rhythm.      Heart sounds: Normal heart sounds.   Pulmonary:      Breath sounds: Decreased air movement present.      Comments: Poor inspiratory effort  Chest:      Comments: Midline incision clean dry and intact  Abdominal:      General: Bowel sounds are normal.      Palpations: Abdomen is soft.   Musculoskeletal:         General: Normal range of motion.      Right lower leg: No edema.      Left lower leg: No edema.   Skin:     General: Skin is warm and dry.      Capillary Refill: Capillary refill takes less than 2 seconds.   Neurological:      Mental Status: He is alert and oriented to person, place, and time.   Psychiatric:         Mood and Affect: Mood normal.         LAB DATA:     CBC:   Results from last 7 days   Lab Units 09/27/24  0455 09/26/24  0436 09/25/24  0308   WBC AUTO x10*3/uL 6.5 6.6 6.8   RBC AUTO x10*6/uL 3.60* 3.59* 3.48*   HEMOGLOBIN g/dL 10.3* 10.2* 9.9*   HEMATOCRIT % 31.4* 29.9* 29.3*   MCV fL 87 83 84   MCH pg 28.6 28.4 28.4   MCHC g/dL 32.8 34.1 33.8   RDW % 14.7* 14.3 14.6*   PLATELETS AUTO x10*3/uL 76* 56* 52*     CMP:    Results from last 7 days   Lab Units 09/27/24  0456 09/26/24  0436  09/25/24  0308 09/23/24  1604 09/20/24  1519   SODIUM mmol/L 133* 135* 134*   < > 134*   POTASSIUM mmol/L 4.0 4.2 3.7   < > 4.2   CHLORIDE mmol/L 102 100 102   < > 103   CO2 mmol/L 21 26 28   < > 24   BUN mg/dL 19 19 18   < > 20   CREATININE mg/dL 0.70 0.80 0.79   < > 0.97   GLUCOSE mg/dL 119* 113* 116*   < > 113*   PROTEIN TOTAL g/dL  --   --   --   --  7.4  7.4   CALCIUM mg/dL 9.0 8.9 8.6   < > 9.9   BILIRUBIN TOTAL mg/dL  --   --   --   --  1.0  1.0   ALK PHOS U/L  --   --   --   --  79  75   AST U/L  --   --   --   --  23  23   ALT U/L  --   --   --   --  30  29    < > = values in this interval not displayed.     BMP:    Results from last 7 days   Lab Units 09/27/24  0456 09/26/24  0436 09/25/24  0308   SODIUM mmol/L 133* 135* 134*   POTASSIUM mmol/L 4.0 4.2 3.7   CHLORIDE mmol/L 102 100 102   CO2 mmol/L 21 26 28   BUN mg/dL 19 19 18   CREATININE mg/dL 0.70 0.80 0.79   CALCIUM mg/dL 9.0 8.9 8.6   GLUCOSE mg/dL 119* 113* 116*     Magnesium:  Results from last 7 days   Lab Units 09/27/24  0456 09/26/24  0436 09/25/24  0308   MAGNESIUM mg/dL 2.11 2.11 2.00     Troponin:      BNP:     Lipid Panel:         DIAGNOSTIC TESTING:   @No results found for this or any previous visit.    ECG 12 lead    Result Date: 9/24/2024  Normal sinus rhythm Possible Inferior infarct , age undetermined Abnormal ECG When compared with ECG of 24-SEP-2024 08:30, (unconfirmed) Borderline criteria for Inferior infarct are now Present    XR chest 1 view    Result Date: 9/24/2024  Interpreted By:  Sagar Colón, STUDY: XR CHEST 1 VIEW;  9/24/2024 11:17 am   INDICATION: Signs/Symptoms:chest tube removal.     COMPARISON: Portable chest, earlier same day 24 September 2020 at 0504 hours   ACCESSION NUMBER(S): [id number]   ORDERING CLINICIAN: JESSI AGUILERA   TECHNIQUE: Single frontal view of the chest; Portable technique   FINDINGS: Left basilar chest tube has been removed   No pneumothorax or any other interval change otherwise       As  above   MACRO: None   Signed by: Sagar Colón 9/24/2024 11:22 AM Dictation workstation:   [id number]    XR chest 1 view    Result Date: 9/24/2024  Interpreted By:  Sherwin Goldstein, STUDY: XR CHEST 1 VIEW;  9/23/2024 8:50 am   INDICATION: Signs/Symptoms:post line insertion; RN to call when ready.   COMPARISON: None.   ACCESSION NUMBER(S): [id number]   ORDERING CLINICIAN: SANTO COLEMAN   FINDINGS: CHEST/LUNGS: The cardiac and mediastinal silhouettes are within normal limits for the technique. No focal areas of consolidation are noted. No pneumothorax is seen.   Right-sided [place]-Usman catheter is in place with the tip terminating in the expected location of the proximal aspect of the main pulmonary trunk. Mild blunting of the right costophrenic angle may relate to a trace effusion or pleural thickening.   UPPER ABDOMEN: No remarkable upper abdominal findings.   OSSEOUS STRUCTURES: No acute changes.       [place]-Usman catheter tip overlies the proximal aspect of the main pulmonary trunk. Blunting of the right costophrenic angle which may relate to a trace effusion or pleural thickening.   MACRO: None.   Signed by: Sherwin Goldstein 9/24/2024 8:59 AM Dictation workstation:   [id number]    XR chest 1 view    Result Date: 9/24/2024  Interpreted By:  Nacho Serrano, STUDY: XR CHEST 1 VIEW;  9/24/2024 5:42 am   INDICATION: Signs/Symptoms:Post op cardiac surgery.     COMPARISON: 09/23/2024.   ACCESSION NUMBER(S): [id number]   ORDERING CLINICIAN: PAULA URBAN   FINDINGS: CARDIOMEDIASTINAL SILHOUETTE: Right jugular line remains in place with tubing tip at the SVC level. Cardiomegaly, aortic prominence and postoperative changes of the mediastinum are unchanged. Cephalad directed midline probable mediastinal drain remains in place. Endotracheal tube and NG tube are no longer seen.   LUNGS: Left basilar chest tube is unchanged in position. Left retrocardiac and basilar consolidation with volume loss is again seen. Small left effusion  not excluded. Mild right basilar atelectasis is present. No appreciable pneumothorax.   ABDOMEN: No remarkable upper abdominal findings.   BONES: Bones are stable.       1.  Persistent consolidation and volume loss in the left retrocardiac region and left base. Small left effusion not excluded.       MACRO: None.   Signed by: Nacho Serrano 9/24/2024 8:39 AM Dictation workstation:   [id number]    ECG 12 Lead    Result Date: 9/24/2024  Normal sinus rhythm ST elevation, consider early repolarization, pericarditis, or injury Abnormal ECG When compared with ECG of 23-SEP-2024 16:20, (unconfirmed) [place] interval has decreased ST elevation now present in Lateral leads    XR chest 1 view    Result Date: 9/23/2024  Interpreted By:  Nacho Olvera, STUDY: XR CHEST 1 VIEW;  9/23/2024 4:19 pm   INDICATION: Signs/Symptoms:Post op cardiac surgery.     COMPARISON: 09/23/2024   ACCESSION NUMBER(S): [id number]   ORDERING CLINICIAN: PAULA URBAN   FINDINGS: AP radiograph of the chest was provided.   Stable right-sided [place]-Usman catheter noted. There has been interval placement of endotracheal tube noted ending 4 cm above the roldan. There is a nasogastric tube noted coursing inferior to the diaphragm in satisfactory position.   CARDIOMEDIASTINAL SILHOUETTE: Cardiomediastinal silhouette is normal in size and configuration.   LUNGS: Interval development of region of airspace consolidation noted in the left mid lung field. There is a small left-sided pleural effusion.   ABDOMEN: No remarkable upper abdominal findings.   BONES: No acute osseous changes. There are sternotomy wires noted.       1.  Stable right-sided [place]-Usman catheter noted. There has been interval placement of endotracheal tube noted ending 4 cm above the roldan. There is a nasogastric tube noted coursing inferior to the diaphragm in satisfactory position. 2. Interval development of region of airspace consolidation noted in the left mid lung field. There is a  small left-sided pleural effusion.         MACRO: None   Signed by: Nacho Olvera 9/23/2024 4:25 PM Dictation workstation:   [id number]    ECG 12 Lead    Result Date: 9/23/2024  Sinus rhythm with 1st degree AV block Low voltage QRS Borderline ECG When compared with ECG of 30-AUG-2024 11:42, T wave amplitude has decreased in Anterior leads       XR chest 1 view   Final Result   No acute unexpected findings after cardiac surgery and more recent   removal of the right IJ line        MACRO:   None        Signed by: Sagar Colón 9/26/2024 12:04 PM   Dictation workstation:   [id number]      XR abdomen 1 view   Final Result   As above        MACRO:   None        Signed by: Sagar Colón 9/25/2024 12:06 PM   Dictation workstation:   [id number]      XR chest 1 view   Final Result   As above        MACRO:   None        Signed by: Sagar Colón 9/24/2024 11:22 AM   Dictation workstation:   [id number]      XR chest 1 view   Final Result   1.  Persistent consolidation and volume loss in the left retrocardiac   region and left base. Small left effusion not excluded.                  MACRO:   None.        Signed by: Nacho Serrano 9/24/2024 8:39 AM   Dictation workstation:   [id number]      XR chest 1 view   Final Result   1.  Stable right-sided [place]-Usman catheter noted. There has been   interval placement of endotracheal tube noted ending 4 cm above the   roldan. There is a nasogastric tube noted coursing inferior to the   diaphragm in satisfactory position.   2. Interval development of region of airspace consolidation noted in   the left mid lung field. There is a small left-sided pleural effusion.                       MACRO:   None        Signed by: Nacho Olvera 9/23/2024 4:25 PM   Dictation workstation:   [id number]      XR chest 1 view   Final Result   [place]-Usman catheter tip overlies the proximal aspect of the main   pulmonary trunk. Blunting of the right costophrenic angle which may   relate to a trace effusion or  pleural thickening.        MACRO:   None.        Signed by: Sherwin Goldstein 9/24/2024 8:59 AM   Dictation workstation:   [id number]      Anesthesia Intraoperative Transesophageal Echocardiogram    (Results Pending)         RADIOLOGY:     XR chest 1 view   Final Result   No acute unexpected findings after cardiac surgery and more recent   removal of the right IJ line        MACRO:   None        Signed by: Sagar Colón 9/26/2024 12:04 PM   Dictation workstation:   [id number]      XR abdomen 1 view   Final Result   As above        MACRO:   None        Signed by: Sagar Colón 9/25/2024 12:06 PM   Dictation workstation:   [id number]      XR chest 1 view   Final Result   As above        MACRO:   None        Signed by: Sagar Colón 9/24/2024 11:22 AM   Dictation workstation:   [id number]      XR chest 1 view   Final Result   1.  Persistent consolidation and volume loss in the left retrocardiac   region and left base. Small left effusion not excluded.                  MACRO:   None.        Signed by: Nacho Serrano 9/24/2024 8:39 AM   Dictation workstation:   [id number]      XR chest 1 view   Final Result   1.  Stable right-sided [place]-Usman catheter noted. There has been   interval placement of endotracheal tube noted ending 4 cm above the   roldan. There is a nasogastric tube noted coursing inferior to the   diaphragm in satisfactory position.   2. Interval development of region of airspace consolidation noted in   the left mid lung field. There is a small left-sided pleural effusion.                       MACRO:   None        Signed by: Nacho Olvera 9/23/2024 4:25 PM   Dictation workstation:   [id number]      XR chest 1 view   Final Result   [place]-Usman catheter tip overlies the proximal aspect of the main   pulmonary trunk. Blunting of the right costophrenic angle which may   relate to a trace effusion or pleural thickening.        MACRO:   None.        Signed by: Sherwin Goldstein 9/24/2024 8:59 AM   Dictation workstation:    [id number]      Anesthesia Intraoperative Transesophageal Echocardiogram    (Results Pending)       PROBLEM LIST     Patient Active Problem List   Diagnosis    Abnormal LFTs    Coronary artery disease involving native coronary artery of native heart with angina pectoris (CMS-HCC)    Bilateral sensorineural hearing loss    Chronic nasal congestion    Cirrhosis (Multi)    Cough    Dizziness    Elevated serum creatinine    Eustachian tube dysfunction    Fatty liver    Hearing loss, bilateral    History of PTCA    Hyperlipidemia    Hypersplenism    Hypertension    Inability to maintain erection    Insomnia    Memory changes    Nicotine dependence    Palpitations    Past myocardial infarction    Retained myringotomy tube    Small bowel obstruction (Multi)    Somnolence, daytime    Thrombocytopenia (CMS-HCC)    Type 2 diabetes mellitus without complication, without long-term current use of insulin (Multi)    History of alcohol abuse    Class 1 obesity with serious comorbidity and body mass index (BMI) of 33.0 to 33.9 in adult    Abnormal finding in urine    Alcohol abuse    Anal discharge    Bursitis of elbow    Difficulty breathing    Dysfunction of both eustachian tubes    History of cardiac catheterization    History of ear disorder    History of pulmonary embolism    Inflammatory dermatosis    Lateral epicondylitis of both elbows    Portal hypertension (Multi)    Tinnitus, right ear    Pulmonary embolism without acute cor pulmonale, unspecified chronicity, unspecified pulmonary embolism type (Multi)    Chest pain    Angina pectoris, unstable (Multi)    Coronary artery disease of native artery of native heart with stable angina pectoris (CMS-HCC)    Chest pain, unspecified type       ASSESSMENT:   64-year-old gentleman seen evaluate the bedside postop day 4 in conjunction with Yaya Pereira RN, CNP.     Bedside examination evaluation performed by me.     Chart reviewed in detail discussed with the patient and  staff.     Impression:  CAD with CABG x 3 (LIMA-LAD, RA-OM, SVG-PDA)   Hypertension  Hyperlipidemia  Former nicotine abuse  Former alcohol abuse with cirrhosis  History of PE        PLAN:   Recommendation:  Postop day 4.  Transferred to telemetry floor.  Remains in chair.  Telemetry shows normal sinus rhythm with no ectopy.    Normal sinus rhythm with heart rates between 70 and 90.  Increase beta-blocker to 50 mg twice daily  Supplemental O2  Monitor electrolytes, keep potassium greater than 4 and magnesium greater than 2  Postop pain control  Incentive spirometer and pulmonary toilet  Advance diet as tolerated  Aggressive PT/OT  Seems euvolemic.  Monitor strict I's and O's and daily weights  SSI  Message sent to schedulers for follow-up with Dr. Bimringham  Expect discharge in the next 24 hours.    Omkar Newman DO,[facility]         Yaya Pereira [facility]  Adult Gerontology Acute Care Nurse Practitioner  [facility]   [facility]  [phone]          Of note, this documentation is completed using the Dragon Dictation system (voice recognition software). There may be spelling and/or grammatical errors that were not corrected prior to final submission.    Please do not hesitate to call with questions.  Electronically signed by Omkar Newman DO, on 9/27/2024 at 10:35 AM

## 2024-09-27 NOTE — CARE PLAN
The patient's goals for the shift include      The clinical goals for the shift include patient will remain hemodynamically stable

## 2024-09-27 NOTE — OP NOTE
Creation Bypass Graft Coronary Artery x3 (LIMA-LAD, RA-OM, SVG-PDA), EVH LEFT ARM AND RIGHT LEG, INTROP HANG,STERNALOCK CHEST PLATING Operative Note     Date: 2024  OR Location: [facility]    Name: Yuri Reynolds, : 1960, Age: 64 y.o., MRN: [id number], Sex: male    Diagnosis  Pre-op Diagnosis      * Coronary artery disease of native artery of native heart with stable angina pectoris [I25.118] Post-op Diagnosis     * Coronary artery disease of native artery of native heart with stable angina pectoris [I25.118]     Procedures     Median sternotomy     Left forearm endoscopic radial artery harvest     Right thigh/lower leg endoscopic saphenous vein harvest     Central cannulation for cardiopulmonary bypass     CABG x 3: SVG to PL(RCA), radial artery to OM, proximally piggy-backed from proximal vein, and LIMA to LAD    Sternal approximation with 5 regular sternal wires and 3 Sternalock wires with plates  Surgeons      * Freeman Millard - Primary    Resident/Fellow/Other Assistant:    Meme Samayoa was present in the whole procedure as there was no qualified resident available.  He assisted with the anastomosis and hemostasis and closure  Alexandrea Virk    Procedure Summary  Anesthesia: General  ASA: IV  Anesthesia Staff: Anesthesiologist: Rodolfo Cast MD; Lazara Medina MD  CRNA: NAVARRO Lovett-MIGUEL  Perfusionist: Jessica Gandara  Estimated Blood Loss: 250mL  Intra-op Medications:   Administrations occurring from 0630 to 1335 on 24:   Medication Name Total Dose   ceFAZolin (Ancef) 2 g in dextrose (iso)  mL 4 g   heparin 1,000 unit/mL injection 42,400 Units 42,400 Units   aminocaproic acid (Amicar) infusion 13.15 g         Intraprocedure I/O Totals       None           Specimen:   ID Type Source Tests Collected by Time   A :  Blood Blood, Venous PLATELET COUNT Freeman Millard MD 2024 1406        Staff:   Circulator:  Daniel  Scrub Person: Alexandrea Gill Circulator: Barrington  Circulator: Kristy         Drains and/or Catheters:   [REMOVED] Urethral Catheter Coude (Removed)   Site Assessment Clean;Skin intact 09/25/24 1216   Collection Container Urometer 09/25/24 1216   Securement Method Securing device (Describe) 09/25/24 1216   Reason for Continuing Urinary Catheterization accurate hourly measurement of urine volume in a critically ill patient that cannot be assessed by other volumes and urine collection strategies 09/25/24 1216   Output (mL) 1000 mL 09/25/24 1216       Tourniquet Times:         Implants:  Implants       Type Name Action Serial No.      Heart Valve CARDIOPLEGIA SET - [id number] Used, Not Implanted      Screw PLATE, X CABLE, XP RIGID FIXATION - SNA - [id number] Implanted NA     Screw PLATE KIT, AUXILIARYCABLE, SHARP NEEDLE, XP RIGID FIXATION - SNA - [id number] Implanted NA     Screw PLATE KIT, BOX CABLE, XP RIGID FIXATION - SNA - [id number] Implanted NA                Indications: Yuri Reynolds is an 64 y.o. male who is having surgery for Coronary artery disease of native artery of native heart with stable angina pectoris [I25.118].     The patient was seen in the preoperative area. The risks, benefits, complications, treatment options, non-operative alternatives, expected recovery and outcomes were discussed with the patient. The possibilities of reaction to medication, pulmonary aspiration, injury to surrounding structures, bleeding, recurrent infection, the need for additional procedures, failure to diagnose a condition, and creating a complication requiring transfusion or operation were discussed with the patient. The patient concurred with the proposed plan, giving informed consent.  The site of surgery was properly noted/marked if necessary per policy. The patient has been actively warmed in preoperative area. Preoperative antibiotics have been ordered and given within 1 hours of incision. Venous thrombosis  prophylaxis are not indicated.    OPERATIVE FINDINGS:  There were no pericardial adhesions or effusions.  The ascending aorta was soft without evidence of atherosclerosis.  The left ventricular function was 55-60%.  The patient had severe diffuse coronary artery disease, however we were able to find locations on the LAD, OM and PL(RCA) that were suitable for grafting.    OPERATIVE PROCEDURE:  The patient was brought to the operating room, placed on the operating table in supine position.  General endotracheal anesthesia and hemodynamic monitoring were established.  The patient was prepped and draped in standard fashion. A median sternotomy was performed.  The LIMA was harvested in a skeletonized manner. The Left radial and right saphenous vein were harvested endoscopically.  The thymus was divided and the pericardium was opened.  The ascending aorta was palpated and it was without evidence of atherosclerosis.  The patient was heparinized.  The ascending aorta and right atrium were cannulated for cardiopulmonary bypass and antegrade  cannula were placed.  The patient was placed on cardiopulmonary bypass, the ascending aorta was crossclamped, and the heart was arrested and protected with cold blood cardioplegia.  During the remainder of the cross-clamp time, cold blood cardioplegia was administered every 15 to 20 minutes.      GRAFTS: CABGx3    Graft 1, PL(RCA):  This vessel was opened proximally, which was 1.2 mm in diameter, free from disease at the point of entry.  A length of reverse saphenous vein was anastomosed end-to-side using continuous 7-0 Prolene.     Graft 2, OM:    This vessel was opened in its mid course which was 1.5 mm in diameter, free from disease at the point of entry. The radial artery was anastomosed end-to-side using continuous 7-0 Prolene.     The proximal end ws anastomosed to the proximal of the SVG-PL(RCA)    Graft 3, LAD:    This vessel was opened in its mid to distal third, which was 1.5  mm in diameter, free from disease at the point of entry. The LIMA was anastomosed end-to-side using continuous 8-0 Prolene.     The proximal end of the vein graft was anastomosed to punch hole into the ascending aorta using continuous 6-0 Prolene sutures.     Assessment of graft flows demonstrated good flows in all the grafts.    Cardiopulmonary bypass was weaned uneventfully.  Right ventricular pacing wires were applied. Protamine was administered to reverse systemic anticoagulation. Hemostasis was secured.     Two drains were inserted, 1 in the left pleural space and 1 in the mediastinum.  The sternum was closed with interrupted stainless steel wires and plating and subcutaneous layers were closed using Vicryl and skin was closed using subcuticular Vicryl.  The patient remained stable, was transferred to the Cardiothoracic Intensive Care Unit in stable cardiorespiratory condition.  I was available for all aspects  of procedure preoperatively and postoperatively      Complications:  None; patient tolerated the procedure well.    Disposition: ICU - intubated and hemodynamically stable.  Condition: stable       Attending Attestation: I was present and scrubbed for the entire procedure.    Freeman Millard  Phone Number: [phone]

## 2024-09-27 NOTE — PROGRESS NOTES
Yuri Reynolds is a 64 y.o. male on day 4 of admission presenting with Coronary artery disease of native artery of native heart with stable angina pectoris.    Subjective   No acute overnight events.  Afebrile, maintaining sinus rhythm to sinus tachycardia, normotensive with adequate saturations now on room air.  Clinically appears euvolemic with -3.7 L fluid balance for length of stay, weight is below baseline preoperative.  No further diuresis today, consider sending out with as needed furosemide at discharge.  Postoperative pain is well-controlled.  Has begun to move bowels, nausea and appetite have improved, diet advanced to cardiac.  Ambulating liberally in halls with use of walker for support.  Epicardial wires clipped skin.  Anticipate discharge home in a.m. if stable.       Objective     Physical Exam  Constitutional:       General: He is not in acute distress.  HENT:      Nose: Nose normal.      Mouth/Throat:      Mouth: Mucous membranes are moist.   Eyes:      Pupils: Pupils are equal, round, and reactive to light.   Cardiovascular:      Rate and Rhythm: Normal rate and regular rhythm.      Pulses: Normal pulses.      Heart sounds: Normal heart sounds.      Comments: Epicardial wires clipped at skin  Pulmonary:      Comments: Sternum stable  Sternotomy well-approximated  No drainage from chest tube sites, sutures intact  Diminished breath sounds at bases otherwise clear to auscultation  Abdominal:      General: Bowel sounds are normal.      Comments: LBM 9/27   Genitourinary:     Comments: Voiding clear yellow  Musculoskeletal:      Cervical back: Normal range of motion.      Comments: Generalized 1+ edema of extremities  Postoperative weakness improving   Skin:     General: Skin is warm and dry.      Comments: EVH and radial harvest sites well-approximated, mild surrounding ecchymosis particularly right lower extremity, no hematoma, no erythema or drainage   Neurological:      General: No focal deficit  present.      Mental Status: He is alert and oriented to person, place, and time.   Psychiatric:         Mood and Affect: Mood normal.         Last Recorded Vitals  Blood pressure 129/66, pulse 109, temperature 36.4 °C (97.5 °F), temperature source Temporal, resp. rate 20, height 1.829 m (6'), weight 100 kg (221 lb 1.9 oz), SpO2 95%.  Intake/Output last 3 Shifts:  I/O last 3 completed shifts:  In: - (0 mL/kg)   Out: 2270 (22.6 mL/kg) [Urine:2270 (0.6 mL/kg/hr)]  Weight: 100.3 kg     Relevant Results  Scheduled medications  albumin human, 25 g, intravenous, Once  aspirin, 81 mg, oral, Daily  aspirin, 150 mg, rectal, Once  colchicine, 0.6 mg, oral, Daily  docusate sodium, 100 mg, oral, TID  insulin lispro, 0-15 Units, subcutaneous, q4h  lactated Ringer's, 500 mL, intravenous, Once  metoclopramide, 10 mg, oral, q6h URI  metoprolol tartrate, 50 mg, oral, BID  mupirocin, 1 Application, Each Nostril, BID  oxygen, , inhalation, Continuous - Inhalation  polyethylene glycol, 17 g, oral, BID  rosuvastatin, 40 mg, oral, Daily  simethicone, 80 mg, oral, 4x daily      Continuous medications     PRN medications  PRN medications: albuterol, alteplase, benzocaine, bisacodyl, calcium chloride, calcium chloride, dextrose **OR** glucagon, magnesium sulfate, magnesium sulfate, naloxone, ondansetron, oxyCODONE, potassium chloride, potassium chloride, traMADol     XR chest 1 view    Result Date: 9/26/2024  Interpreted By:  Sagar Colón, STUDY: XR CHEST 1 VIEW;  9/26/2024 11:58 am   INDICATION: Signs/Symptoms:s/p CABG.     COMPARISON: Portable chest 04/20/2024 at 1109 hours   ACCESSION NUMBER(S): [id number]   ORDERING CLINICIAN: VERONIQUE MEYER   TECHNIQUE: Single frontal view of the chest; Portable technique   FINDINGS: Right IJ line has been removed   No other interval change   The cardiomediastinal silhouette is unchanged   No consolidative lung opacity   No edema / failure   No large pleural effusion or demonstrable pneumothorax        No acute unexpected findings after cardiac surgery and more recent removal of the right IJ line   MACRO: None   Signed by: Sagar Colón 9/26/2024 12:04 PM Dictation workstation:   [id number]      Results for orders placed or performed during the hospital encounter of 09/23/24 (from the past 24 hour(s))   POCT GLUCOSE   Result Value Ref Range    POCT Glucose 118 (H) 74 - 99 mg/dL   POCT GLUCOSE   Result Value Ref Range    POCT Glucose 107 (H) 74 - 99 mg/dL   POCT GLUCOSE   Result Value Ref Range    POCT Glucose 146 (H) 74 - 99 mg/dL   POCT GLUCOSE   Result Value Ref Range    POCT Glucose 97 74 - 99 mg/dL   POCT GLUCOSE   Result Value Ref Range    POCT Glucose 114 (H) 74 - 99 mg/dL   CBC   Result Value Ref Range    WBC 6.5 4.4 - 11.3 x10*3/uL    nRBC 0.0 0.0 - 0.0 /100 WBCs    RBC 3.60 (L) 4.50 - 5.90 x10*6/uL    Hemoglobin 10.3 (L) 13.5 - 17.5 g/dL    Hematocrit 31.4 (L) 41.0 - 52.0 %    MCV 87 80 - 100 fL    MCH 28.6 26.0 - 34.0 pg    MCHC 32.8 32.0 - 36.0 g/dL    RDW 14.7 (H) 11.5 - 14.5 %    Platelets 76 (L) 150 - 450 x10*3/uL   Magnesium   Result Value Ref Range    Magnesium 2.11 1.60 - 2.40 mg/dL   Renal Function Panel   Result Value Ref Range    Glucose 119 (H) 74 - 99 mg/dL    Sodium 133 (L) 136 - 145 mmol/L    Potassium 4.0 3.5 - 5.3 mmol/L    Chloride 102 98 - 107 mmol/L    Bicarbonate 21 21 - 32 mmol/L    Anion Gap 14 10 - 20 mmol/L    Urea Nitrogen 19 6 - 23 mg/dL    Creatinine 0.70 0.50 - 1.30 mg/dL    eGFR >90 >60 mL/min/1.73m*2    Calcium 9.0 8.6 - 10.3 mg/dL    Phosphorus 2.8 2.5 - 4.9 mg/dL    Albumin 3.8 3.4 - 5.0 g/dL   POCT GLUCOSE   Result Value Ref Range    POCT Glucose 116 (H) 74 - 99 mg/dL   POCT GLUCOSE   Result Value Ref Range    POCT Glucose 115 (H) 74 - 99 mg/dL             Assessment/Plan   Assessment & Plan  Coronary artery disease of native artery of native heart with stable angina pectoris    Chest pain, unspecified type    Angina pectoris, unstable (Multi)    Coronary artery  disease; status post CABG  Status post CABG x 3 with LIMA to LAD, radial graft to OM, and saphenous vein graft to PDA on 9/23.  -Continue daily low-dose aspirin, statin  -Epicardial wires clipped at skin  -Metoprolol 25 mg twice daily  -Clinically appears euvolemic, no further diuresis today, consider as needed furosemide at discharge  -Replete electrolytes to maintain potassium greater than 4.0, magnesium greater than 2.0  -DVT prophylaxis with PADMA hose and SCDs, no chemoprophylaxis due to thrombocytopenia  -Advance diet to cardiac  -Bowel regimen with MiraLAX and Colace  -Tylenol and  tramadol 50 mg every 6 hours as needed added as alternative to narcotics  -Incentive spirometry, out of bed 3 times daily  -PT/OT following  -TCC for discharge planning, anticipate home with home health care  -Tentative discharge in a.m. if stable     Anemia; thrombocytopenia  Acute postoperative blood loss anemia secondary to hemodilution and consumption, chronic thrombocytopenia with baseline platelets ranging 60-80.  POD #1: Hemoglobin 10.7, platelet count 84K, no signs of active bleeding  POD #2: Hemoglobin 9.9, platelet count 52K, no signs of active bleeding  POD#3: Hemoglobin 10.2, platelet count 56K, no signs of active bleeding  POD #4: Hemoglobin 10.3, platelets 76K, no signs active bleeding  -No chemoprophylaxis for DVT, PADMA hose and SCDs only  -Follow CBC     Cirrhosis  Chronic hepatic cirrhosis, LFTs stable       I spent 35 minutes in the professional and overall care of this patient.      Radha Luciano, APRN-CNP

## 2024-09-27 NOTE — CARE PLAN
Problem: Skin  Goal: Decreased wound size/increased tissue granulation at next dressing change  Outcome: Progressing  Goal: Participates in plan/prevention/treatment measures  Outcome: Progressing  Goal: Prevent/manage excess moisture  Outcome: Progressing  Goal: Prevent/minimize sheer/friction injuries  Outcome: Progressing  Goal: Promote/optimize nutrition  Outcome: Progressing  Goal: Promote skin healing  Outcome: Progressing     Problem: Pain - Adult  Goal: Verbalizes/displays adequate comfort level or baseline comfort level  Outcome: Progressing     Problem: Safety - Adult  Goal: Free from fall injury  Outcome: Progressing     Problem: Discharge Planning  Goal: Discharge to home or other facility with appropriate resources  Outcome: Progressing     Problem: Chronic Conditions and Co-morbidities  Goal: Patient's chronic conditions and co-morbidity symptoms are monitored and maintained or improved  Outcome: Progressing     Problem: Pain  Goal: Takes deep breaths with improved pain control throughout the shift  Outcome: Progressing  Goal: Turns in bed with improved pain control throughout the shift  Outcome: Progressing  Goal: Walks with improved pain control throughout the shift  Outcome: Progressing  Goal: Performs ADL's with improved pain control throughout shift  Outcome: Progressing  Goal: Participates in PT with improved pain control throughout the shift  Outcome: Progressing  Goal: Free from opioid side effects throughout the shift  Outcome: Progressing  Goal: Free from acute confusion related to pain meds throughout the shift  Outcome: Progressing     Problem: Indwelling Catheter Maintenance  Goal: I will have no complications from indwelling catheter  Outcome: Progressing     Problem: Resident is recovering from cardiovascular surgery  Goal: I will maintain and build strength.  Outcome: Progressing  Goal: I will decrease complications and risks after surgery  Outcome: Progressing   The patient's goals  for the shift include      The clinical goals for the shift include patient will remain free from chest pain throughout shift

## 2024-09-27 NOTE — NURSING NOTE
Rapid Response Nurse:  Patient transferred out of ICU on 09/25/24. VSS. NSR on telemetry. 94% on 2L NC. Sternal incision open to air. No complaint of CP or SOB. No concerns a this time.

## 2024-09-27 NOTE — PROGRESS NOTES
09/27/24 1125   Discharge Planning   Home or Post Acute Services In home services   Type of Home Care Services Home nursing visits;Home OT;Home PT   Expected Discharge Disposition Home H   Patient Choice   Provider Choice list and CMS website (https://medicare.gov/care-compare#search) for post-acute Quality and Resource Measure Data were provided and reviewed with: Patient     Pt is s/p POD #4 for elective CABG x3 (LIMA-LAD, RA-OM, SVG-PDA) with Dr Gan.  Doing well. Anticipate dc likely  9/28  plan is for [facility] .

## 2024-09-27 NOTE — PROGRESS NOTES
Physical Therapy    Physical Therapy Treatment    Patient Name: Yuri Reynolds  MRN: [id number]  Today's Date: 9/27/2024  Time Calculation  Start Time: 0814  Stop Time: 0850  Time Calculation (min): 36 min     814/814-A    Assessment/Plan   PT Assessment  PT Assessment Results: Decreased mobility, Decreased strength, Pain  Rehab Prognosis: Good  Evaluation/Treatment Tolerance: Patient tolerated treatment well  Medical Staff Made Aware: Yes  Barriers to Participation: Comorbidities (endurance)  End of Session Communication: Bedside nurse  Assessment Comment: pt would benefit from continued therapy to improve functional mobility and safety  End of Session Patient Position: Up in chair .Alarm off, not on at start of session   PT Plan  Inpatient/Swing Bed or Outpatient: Inpatient  Treatment/Interventions: Transfer training, Gait training, Therapeutic exercise, Therapeutic activity, Home exercise program  PT Plan: Ongoing PT  PT Frequency: 4 times per week  PT Discharge Recommendations: Low intensity level of continued care  Equipment Recommended upon Discharge: Wheeled walker, Straight cane PT Recommended Transfer Status: Assist x1, Assistive device    General Visit Information:   PT  Visit  PT Received On: 09/27/24  General  Reason for Referral: S/P CABG x 3  Missed Visit: No  Family/Caregiver Present: No  Caregiver Feedback: spouse present and supportive  Prior to Session Communication: Bedside nurse (cleared to participate)  Patient Position Received: Alarm off, not on at start of session, Bed, 3 rail up  General Comment: agreeable to particpate, states that he is feeling better today    General Observations:        Subjective     Precautions:  Precautions  Hearing/Visual Limitations: [place]  LE Weight Bearing Status: Weight Bearing as Tolerated  Medical Precautions: Fall precautions  Post-Surgical Precautions: Move in the Tube  Precautions Comment: reviewed MITT precautions Pt able to recall and maintain    Vital  Signs:  Vital Signs  Heart Rate: 109 (increasing to 119BPM, recovering once seated)  SpO2: 95 % (to 98% on RA)  Objective     Pain:  Pain Assessment  Pain Assessment: 0-10  0-10 (Numeric) Pain Score: 2  Pain Type: Surgical pain  Pain Location: Chest  Pain Descriptors: Pressure  Pain Interventions: Ambulation/increased activity    Cognition:  Cognition  Overall Cognitive Status: Within Functional Limits    Activity Tolerance:  Activity Tolerance  Endurance: Endurance does not limit participation in activity (requires increased time to complete)    Treatments:  Therapeutic Exercise  Therapeutic Exercise Performed:  (reviewed HEP, pt states he plans on having home health care if needed)        Bed Mobility  Bed Mobility: Yes  Bed Mobility 1  Bed Mobility Comments 1: transfers supine to sit with bed flat and S using good technique  Ambulation/Gait Training  Ambulation/Gait Training Performed: Yes  Ambulation/Gait Training 1  Surface 1: Level tile  Device 1: Rolling walker  Comments/Distance (ft) 1: ambulating 125 ft and 150 ft with WW and S using  very slow steady gait  Transfers  Transfer: Yes  Transfer 1  Technique 1: Sit to stand, Stand to sit  Trials/Comments 1: transfers sit<--> stand with WW and S while maintaining MITT precautions  Stairs  Stairs: Yes (up and down 5 stairs 2 times with use of single railing and S with step to gait pattern)       Outcome Measures:     [facility] Basic Mobility  Turning from your back to your side while in a flat bed without using bedrails: None  Moving from lying on your back to sitting on the side of a flat bed without using bedrails: A little  Moving to and from bed to chair (including a wheelchair): A little  Standing up from a chair using your arms (e.g. wheelchair or bedside chair): A little  To walk in hospital room: A little  Climbing 3-5 steps with railing: A little  Basic Mobility - Total Score: 19             EDUCATION:    Individual(s) Educated: Patient  Education Provided:  Fall Risk, Home Exercise Program, Home Safety  Patient Response to Education: Patient/Caregiver Verbalized Understanding of Information  Education Comment: instructed in use of and issued WW for home    Encounter Problems       Encounter Problems (Active)       PT Problem       Pt will demonstrate mod I  with bed mobility to edge of bed.   (Met)       Start:  09/24/24    Expected End:  10/08/24    Resolved:  09/26/24         Pt will demonstrate mod I with sit to stand/chair transfers with least restrictive device.   (Progressing)       Start:  09/24/24    Expected End:  10/08/24            Pt will ambulate 150 feet with mod I  least restrictive device . ' (Progressing)       Start:  09/24/24    Expected End:  10/08/24            Pt to demo 12 steps with  rails with SBA  (Progressing)       Start:  09/24/24    Expected End:  10/08/24               Pain - Adult

## 2024-09-28 ENCOUNTER — HOME HEALTH ADMISSION (OUTPATIENT)
Dept: HOME HEALTH SERVICES | Facility: HOME HEALTH | Age: 64
End: 2024-09-28
Payer: COMMERCIAL

## 2024-09-28 VITALS
SYSTOLIC BLOOD PRESSURE: 125 MMHG | HEART RATE: 85 BPM | DIASTOLIC BLOOD PRESSURE: 65 MMHG | OXYGEN SATURATION: 95 % | RESPIRATION RATE: 17 BRPM | TEMPERATURE: 97.7 F

## 2024-09-28 VITALS
HEART RATE: 109 BPM | OXYGEN SATURATION: 98 % | BODY MASS INDEX: 31.23 KG/M2 | HEIGHT: 72 IN | DIASTOLIC BLOOD PRESSURE: 68 MMHG | TEMPERATURE: 98.1 F | SYSTOLIC BLOOD PRESSURE: 134 MMHG | WEIGHT: 230.6 LBS | RESPIRATION RATE: 20 BRPM

## 2024-09-28 LAB
ALBUMIN SERPL BCP-MCNC: 3.9 G/DL (ref 3.4–5)
ANION GAP SERPL CALC-SCNC: 14 MMOL/L (ref 10–20)
BUN SERPL-MCNC: 19 MG/DL (ref 6–23)
CALCIUM SERPL-MCNC: 9.2 MG/DL (ref 8.6–10.3)
CHLORIDE SERPL-SCNC: 100 MMOL/L (ref 98–107)
CO2 SERPL-SCNC: 25 MMOL/L (ref 21–32)
CREAT SERPL-MCNC: 0.8 MG/DL (ref 0.5–1.3)
EGFRCR SERPLBLD CKD-EPI 2021: >90 ML/MIN/1.73M*2
ERYTHROCYTE [DISTWIDTH] IN BLOOD BY AUTOMATED COUNT: 14.5 % (ref 11.5–14.5)
GLUCOSE BLD MANUAL STRIP-MCNC: 107 MG/DL (ref 74–99)
GLUCOSE BLD MANUAL STRIP-MCNC: 107 MG/DL (ref 74–99)
GLUCOSE BLD MANUAL STRIP-MCNC: 116 MG/DL (ref 74–99)
GLUCOSE BLD MANUAL STRIP-MCNC: 133 MG/DL (ref 74–99)
GLUCOSE SERPL-MCNC: 116 MG/DL (ref 74–99)
HCT VFR BLD AUTO: 32.2 % (ref 41–52)
HGB BLD-MCNC: 11.1 G/DL (ref 13.5–17.5)
HOLD SPECIMEN: NORMAL
MAGNESIUM SERPL-MCNC: 2.08 MG/DL (ref 1.6–2.4)
MCH RBC QN AUTO: 29 PG (ref 26–34)
MCHC RBC AUTO-ENTMCNC: 34.5 G/DL (ref 32–36)
MCV RBC AUTO: 84 FL (ref 80–100)
NRBC BLD-RTO: 0 /100 WBCS (ref 0–0)
PHOSPHATE SERPL-MCNC: 3.7 MG/DL (ref 2.5–4.9)
PLATELET # BLD AUTO: 103 X10*3/UL (ref 150–450)
POTASSIUM SERPL-SCNC: 3.8 MMOL/L (ref 3.5–5.3)
RBC # BLD AUTO: 3.83 X10*6/UL (ref 4.5–5.9)
SODIUM SERPL-SCNC: 135 MMOL/L (ref 136–145)
WBC # BLD AUTO: 6.5 X10*3/UL (ref 4.4–11.3)

## 2024-09-28 PROCEDURE — 37799 UNLISTED PX VASCULAR SURGERY: CPT | Performed by: NURSE PRACTITIONER

## 2024-09-28 PROCEDURE — 2500000001 HC RX 250 WO HCPCS SELF ADMINISTERED DRUGS (ALT 637 FOR MEDICARE OP): Performed by: NURSE PRACTITIONER

## 2024-09-28 PROCEDURE — 80069 RENAL FUNCTION PANEL: CPT | Performed by: NURSE PRACTITIONER

## 2024-09-28 PROCEDURE — 2500000002 HC RX 250 W HCPCS SELF ADMINISTERED DRUGS (ALT 637 FOR MEDICARE OP, ALT 636 FOR OP/ED)

## 2024-09-28 PROCEDURE — 99239 HOSP IP/OBS DSCHRG MGMT >30: CPT | Performed by: NURSE PRACTITIONER

## 2024-09-28 PROCEDURE — 85027 COMPLETE CBC AUTOMATED: CPT | Performed by: NURSE PRACTITIONER

## 2024-09-28 PROCEDURE — 2500000001 HC RX 250 WO HCPCS SELF ADMINISTERED DRUGS (ALT 637 FOR MEDICARE OP)

## 2024-09-28 PROCEDURE — 2500000001 HC RX 250 WO HCPCS SELF ADMINISTERED DRUGS (ALT 637 FOR MEDICARE OP): Performed by: PHARMACIST

## 2024-09-28 PROCEDURE — 82947 ASSAY GLUCOSE BLOOD QUANT: CPT

## 2024-09-28 PROCEDURE — 2500000004 HC RX 250 GENERAL PHARMACY W/ HCPCS (ALT 636 FOR OP/ED): Performed by: NURSE PRACTITIONER

## 2024-09-28 PROCEDURE — 83735 ASSAY OF MAGNESIUM: CPT | Performed by: NURSE PRACTITIONER

## 2024-09-28 RX ORDER — FUROSEMIDE 20 MG/1
20 TABLET ORAL DAILY PRN
Qty: 10 TABLET | Refills: 0 | Status: SHIPPED | OUTPATIENT
Start: 2024-09-29

## 2024-09-28 RX ORDER — COLCHICINE 0.6 MG/1
0.6 TABLET ORAL DAILY
Qty: 5 TABLET | Refills: 0 | Status: SHIPPED | OUTPATIENT
Start: 2024-09-29 | End: 2024-10-04 | Stop reason: WASHOUT

## 2024-09-28 RX ORDER — METOPROLOL TARTRATE 50 MG/1
50 TABLET ORAL 2 TIMES DAILY
Qty: 60 TABLET | Refills: 0 | Status: SHIPPED | OUTPATIENT
Start: 2024-09-28 | End: 2024-10-28

## 2024-09-28 RX ORDER — TRAMADOL HYDROCHLORIDE 50 MG/1
50 TABLET ORAL EVERY 6 HOURS PRN
Qty: 15 TABLET | Refills: 0 | Status: SHIPPED | OUTPATIENT
Start: 2024-09-28 | End: 2024-10-03

## 2024-09-28 RX ORDER — POTASSIUM CHLORIDE 20 MEQ/1
20 TABLET, EXTENDED RELEASE ORAL DAILY PRN
Status: DISCONTINUED | OUTPATIENT
Start: 2024-09-29 | End: 2024-09-28 | Stop reason: HOSPADM

## 2024-09-28 RX ORDER — ASPIRIN 81 MG/1
81 TABLET ORAL DAILY
Qty: 30 TABLET | Refills: 0 | Status: SHIPPED | OUTPATIENT
Start: 2024-09-29 | End: 2024-10-29

## 2024-09-28 RX ORDER — POTASSIUM CHLORIDE 20 MEQ/1
20 TABLET, EXTENDED RELEASE ORAL DAILY PRN
Qty: 10 TABLET | Refills: 0 | Status: SHIPPED | OUTPATIENT
Start: 2024-09-29

## 2024-09-28 RX ORDER — FUROSEMIDE 40 MG/1
20 TABLET ORAL DAILY PRN
Status: DISCONTINUED | OUTPATIENT
Start: 2024-09-29 | End: 2024-09-28 | Stop reason: HOSPADM

## 2024-09-28 ASSESSMENT — COGNITIVE AND FUNCTIONAL STATUS - GENERAL
DAILY ACTIVITIY SCORE: 24
MOBILITY SCORE: 24

## 2024-09-28 ASSESSMENT — PAIN DESCRIPTION - LOCATION: LOCATION: CHEST

## 2024-09-28 ASSESSMENT — PAIN SCALES - GENERAL: PAINLEVEL_OUTOF10: 4

## 2024-09-28 ASSESSMENT — PAIN DESCRIPTION - ORIENTATION: ORIENTATION: MID

## 2024-09-28 NOTE — DISCHARGE INSTRUCTIONS
Best exercise is walking 3-4 times per day. Taking short frequent walks instead of long walks helps to build stamina and reduces the incidence of DVTs. Gradually increase activity level every day.   Pt instructed to inform Radiology of retained wires if ever needs MRI and to make appointment with cardiac surgeon if wires ever poke out or pt develops chronic drainage from the site of the previous wires; pt expressed understanding.   Maintain sternal precautions for 6 weeks following surgery  May shower, no tub bathing for 2 weeks  Weigh yourself daily  Notify cardiac surgeon for any fevers, increased incisional pain or drainage or other concerning symptoms   No driving for 6 weeks. May wear a seatbelt, but must ride in back seat.    yes...

## 2024-09-28 NOTE — CARE PLAN
Problem: Skin  Goal: Decreased wound size/increased tissue granulation at next dressing change  Outcome: Progressing  Flowsheets (Taken 9/28/2024 1225)  Decreased wound size/increased tissue granulation at next dressing change: Promote sleep for wound healing  Goal: Participates in plan/prevention/treatment measures  Outcome: Progressing  Flowsheets (Taken 9/28/2024 1225)  Participates in plan/prevention/treatment measures: Increase activity/out of bed for meals  Goal: Prevent/manage excess moisture  Outcome: Progressing  Flowsheets (Taken 9/28/2024 1225)  Prevent/manage excess moisture:   Moisturize dry skin   Monitor for/manage infection if present  Goal: Prevent/minimize sheer/friction injuries  Outcome: Progressing  Flowsheets (Taken 9/28/2024 1225)  Prevent/minimize sheer/friction injuries: Increase activity/out of bed for meals  Goal: Promote/optimize nutrition  Outcome: Progressing  Flowsheets (Taken 9/28/2024 1225)  Promote/optimize nutrition:   Consume > 50% meals/supplements   Monitor/record intake including meals   Offer water/supplements/favorite foods  Goal: Promote skin healing  Outcome: Progressing  Flowsheets (Taken 9/28/2024 1225)  Promote skin healing:   Assess skin/pad under line(s)/device(s)   Rotate device position/do not position patient on device     Problem: Pain - Adult  Goal: Verbalizes/displays adequate comfort level or baseline comfort level  Outcome: Progressing     Problem: Safety - Adult  Goal: Free from fall injury  Outcome: Progressing     Problem: Discharge Planning  Goal: Discharge to home or other facility with appropriate resources  Outcome: Progressing   The patient's goals for the shift include      The clinical goals for the shift include patient will remain hemodynamically stable throughout this shift.

## 2024-09-28 NOTE — CARE PLAN
Problem: Skin  Goal: Decreased wound size/increased tissue granulation at next dressing change  Outcome: Progressing  Goal: Participates in plan/prevention/treatment measures  Outcome: Progressing  Goal: Prevent/manage excess moisture  Outcome: Progressing  Goal: Prevent/minimize sheer/friction injuries  Outcome: Progressing  Goal: Promote/optimize nutrition  Outcome: Progressing  Goal: Promote skin healing  Outcome: Progressing     Problem: Pain - Adult  Goal: Verbalizes/displays adequate comfort level or baseline comfort level  Outcome: Progressing     Problem: Safety - Adult  Goal: Free from fall injury  Outcome: Progressing     Problem: Discharge Planning  Goal: Discharge to home or other facility with appropriate resources  Outcome: Progressing     Problem: Chronic Conditions and Co-morbidities  Goal: Patient's chronic conditions and co-morbidity symptoms are monitored and maintained or improved  Outcome: Progressing     Problem: Pain  Goal: Takes deep breaths with improved pain control throughout the shift  Outcome: Progressing  Goal: Turns in bed with improved pain control throughout the shift  Outcome: Progressing  Goal: Walks with improved pain control throughout the shift  Outcome: Progressing  Goal: Performs ADL's with improved pain control throughout shift  Outcome: Progressing  Goal: Participates in PT with improved pain control throughout the shift  Outcome: Progressing  Goal: Free from opioid side effects throughout the shift  Outcome: Progressing  Goal: Free from acute confusion related to pain meds throughout the shift  Outcome: Progressing     Problem: Indwelling Catheter Maintenance  Goal: I will have no complications from indwelling catheter  Outcome: Progressing     Problem: Resident is recovering from cardiovascular surgery  Goal: I will maintain and build strength.  Outcome: Progressing  Goal: I will decrease complications and risks after surgery  Outcome: Progressing   The patient's goals  for the shift include      The clinical goals for the shift include patient will remain hemodynamically stable

## 2024-09-28 NOTE — DISCHARGE SUMMARY
Discharge Diagnosis  Coronary artery disease of native artery of native heart with stable angina pectoris    Issues Requiring Follow-Up  Wound check, chest xray, Dr. Abarham oncology     Test Results Pending At Discharge  Pending Labs       No current pending labs.            Hospital Course  64-year-old male with history of coronary artery disease with remote MI in 1999 and 2003, prior stenting to the RCA, hypertension, hyperlipidemia, prior tobacco use, remote PE on Eliquis anticoagulation, type 2 diabetes mellitus, chronic thrombocytopenia, and chronic cirrhosis in the setting of prior heavy alcohol use.  The patient has completely abstained from alcohol for many years.  Developed worsening exertional dyspnea and chest pain that increased in frequency and severity, underwent cardiac catheterization on 8/30 with findings of severe multivessel disease best amenable to surgical revascularization.  Due to his issues of chronic cirrhosis and thrombocytopenia, patient was referred to both hepatology and hematology for preoperative risk stratification and determined to be suitable from a risk standpoint to undergo surgery.    On 9/23, patient was admitted for elective coronary artery bypass grafting x 2 with LIMA to the LAD, radial graft to the obtuse marginal, and saphenous vein graft to the PDA with sternal plating.   No acute overnight events.  This morning is afebrile, maintaining sinus rhythm, normotensive with systolic blood pressure 100-130.  Chest x-ray is stable, maintaining adequate saturations on 2 L nasal cannula.  Spirometry volumes improving to 752,000 mL.  Responded well to diuresis yesterday with -1.6 L output, length of stay fluid balance +755 mL.  Continue diuresis today.  Postoperative pain is well-controlled, primary complaint is nausea and frequent belching.  Abdomen appears slightly more distended with hypoactive bowel sounds.  Patient reports lower abdominal pressure but unable to pass flatus.  Will  change diet back to clears, add scheduled Reglan, simethicone and obtain KUB.  Narcotics have been discontinued.  IV acetaminophen and tramadol added as alternative for pain management.  Stable to downgrade to telemetry today.  No acute overnight events. This morning is afebrile, maintaining sinus rhythm, normotensive with systolic blood pressure 110-130.  Chest x-ray is stable, maintaining adequate saturations on 2 L nasal cannula.  Spirometry volumes improving to 1200 mL. Responded well to diuresis yesterday with -2.8 L output.   Hold off on diuresis today. Postoperative pain is well-controlled. Did receive one time dose of oxycodone for sternum pain after ambulating. KUB showing no evidence of ileus, mild gaseous dilation of the transverse colon, stool is in the right colon, gas is in normal caliber left, sigmoid colon and rectum, only scattered small bowel gas, none in dilated loops. Will advance diet as tolerated. States he is passing flatus. IV acetaminophen and tramadol added as alternative for pain management.  Increase activity as tolerated. PT/OT following. Tentative discharge 24-48 hours if remains stable.   No acute overnight events. Afebrile, maintaining sinus rhythm to sinus tachycardia, normotensive with adequate saturations now on room air. Clinically appears euvolemic with -3.7 L fluid balance for length of stay, weight is below baseline preoperative. No further diuresis today, consider sending out with as needed furosemide at discharge. Postoperative pain is well-controlled. Has begun to move bowels, nausea and appetite have improved, diet advanced to cardiac. Ambulating liberally in halls with use of walker for support. Epicardial wires clipped skin. Anticipate discharge home in a.m. if stable.   POD#5: No acute overnight events, remains afebrile, normotensive, maintaining adequate oxygenation on room air.  Clinically appears euvolemic but will be discharged on Lasix and potassium replacement as  needed with parameters. Pain is well controlled on current regimen.  Will send at discharge Rx for Ultram 50 mg as needed for pain.  He has had a bowel movement and denies nausea and vomiting.  On exam left arm with moderate bruising but remains soft.  Advised to monitor closely at home and call clinic with any concerns.  Platelet count improving to 103k today.  Patient was resumed on Eliquis.  I did reach out to Dr. Abraham with oncology who is okay with recommendation to resume Eliquis as well.  Patient is stable to be discharged from cardiac surgery standpoint.  Reviewed medications, precautions and follow-ups with patient and wife.  All questions answered.  Pertinent Physical Exam At Time of Discharge  Physical Exam  Vitals and nursing note reviewed.   Constitutional:       General: He is not in acute distress.     Appearance: Normal appearance. He is not ill-appearing.   HENT:      Head: Normocephalic and atraumatic.   Eyes:      Pupils: Pupils are equal, round, and reactive to light. Pupils are equal.   Cardiovascular:      Rate and Rhythm: Normal rate and regular rhythm.   Pulmonary:      Breath sounds: Examination of the right-lower field reveals decreased breath sounds. Examination of the left-lower field reveals decreased breath sounds. Decreased breath sounds present.      Comments: Sternum stable  Sternotomy well-approximated  No drainage from chest tube sites, sutures intact  Diminished breath sounds at bases otherwise clear to auscultation    Chest:      Chest wall: No tenderness.   Abdominal:      General: Abdomen is protuberant. Bowel sounds are normal.      Palpations: Abdomen is soft.      Comments: Positive BM 9/27   Genitourinary:     Comments: Voiding clear yellow  Musculoskeletal:      Comments: Generalized postop weakness improving   Skin:     Comments: LE EVH site with mild ecchymosis  Left arm with moderate ecchymosis and swelling but remains soft.   Neurological:      Mental Status: He is  alert.   Psychiatric:         Behavior: Behavior is cooperative.         Home Medications     Medication List      START taking these medications     colchicine 0.6 mg tablet; Take 1 tablet (0.6 mg) by mouth once daily for   5 doses.; Start taking on: September 29, 2024   metoprolol tartrate 50 mg tablet; Commonly known as: Lopressor; Take 1   tablet by mouth 2 times a day.   traMADol 50 mg tablet; Commonly known as: Ultram; Take 1 tablet (50 mg)   by mouth every 6 hours if needed for moderate pain (4 - 6) for up to 5   days.     CHANGE how you take these medications     aspirin 81 mg EC tablet; Take 1 tablet (81 mg) by mouth once daily.;   Start taking on: September 29, 2024; What changed: when to take this     CONTINUE taking these medications     apixaban 5 mg tablet; Commonly known as: Eliquis; Take 1 tablet (5 mg)   by mouth 2 times a day.   metFORMIN  mg 24 hr tablet; Commonly known as: Glucophage-XR; Take   1 tablet (500 mg) by mouth once daily in the evening. Take with meals.   nitroglycerin 0.4 mg SL tablet; Commonly known as: Nitrostat; Place 1   tablet (0.4 mg) under the tongue every 5 minutes if needed for chest pain.   May repeat dose every 5 minutes for up to 3 doses total.     STOP taking these medications     isosorbide mononitrate  mg 24 hr tablet; Commonly known as: Imdur   lisinopril 10 mg tablet   metoprolol succinate XL 50 mg 24 hr tablet; Commonly known as: Toprol-XL   triamcinolone 0.1 % cream; Commonly known as: Kenalog     ASK your doctor about these medications     albuterol 90 mcg/actuation inhaler; Inhale 2 puffs every 6 hours if   needed for shortness of breath.   rosuvastatin 40 mg tablet; Commonly known as: Crestor; Take 1 tablet (40   mg) by mouth once daily.       Outpatient Follow-Up  Future Appointments   Date Time Provider Department Center   10/3/2024 10:30 AM FRANKO STEINER NURSE [id number] [place]   10/4/2024  2:15 PM Kip Birmingham MD [id number] [place]    10/22/2024  1:30 PM Freeman Millard MD [id number] [place]   10/30/2024  3:20 PM Marc Harding DO [id number] [place]   12/9/2024  3:30 PM Kip Birmingham MD [id number] [place]       Elida Hyman, APRN-CNP

## 2024-09-28 NOTE — PROGRESS NOTES
09/28/24 1125   Discharge Planning   Home or Post Acute Services In home services   Type of Home Care Services Home nursing visits;Home OT;Home PT   Expected Discharge Disposition Home H     Pt is s/p elective CABG x3 (LIMA-LAD, RA-OM, SVG-PDA) with Dr Gan. [facility] scores are PT (16) OT (16) recommending continued therapy at low level/intensity. Pt discharge preference remains home with [facility]. Demographics verified, best phone # [phone]. [facility]  notified of HHC referral/HCO in Epic, awaiting confirmation of SOC.    Update: [facility]  confirms SOC 24-48 hours

## 2024-09-29 LAB
ATRIAL RATE: 86 BPM
ATRIAL RATE: 91 BPM
P AXIS: 62 DEGREES
P AXIS: 67 DEGREES
P OFFSET: 175 MS
P OFFSET: 182 MS
P ONSET: 129 MS
P ONSET: 130 MS
PR INTERVAL: 186 MS
PR INTERVAL: 190 MS
Q ONSET: 223 MS
Q ONSET: 224 MS
QRS COUNT: 15 BEATS
QRS COUNT: 15 BEATS
QRS DURATION: 84 MS
QRS DURATION: 88 MS
QT INTERVAL: 344 MS
QT INTERVAL: 356 MS
QTC CALCULATION(BAZETT): 423 MS
QTC CALCULATION(BAZETT): 426 MS
QTC FREDERICIA: 395 MS
QTC FREDERICIA: 401 MS
R AXIS: 15 DEGREES
R AXIS: 2 DEGREES
T AXIS: 11 DEGREES
T AXIS: 9 DEGREES
T OFFSET: 395 MS
T OFFSET: 402 MS
VENTRICULAR RATE: 86 BPM
VENTRICULAR RATE: 91 BPM

## 2024-09-30 ENCOUNTER — HOME CARE VISIT (OUTPATIENT)
Dept: HOME HEALTH SERVICES | Facility: HOME HEALTH | Age: 64
End: 2024-09-30
Payer: COMMERCIAL

## 2024-09-30 ENCOUNTER — PATIENT OUTREACH (OUTPATIENT)
Dept: CARE COORDINATION | Facility: CLINIC | Age: 64
End: 2024-09-30
Payer: COMMERCIAL

## 2024-09-30 VITALS
SYSTOLIC BLOOD PRESSURE: 116 MMHG | RESPIRATION RATE: 16 BRPM | HEART RATE: 99 BPM | TEMPERATURE: 97.2 F | OXYGEN SATURATION: 97 % | DIASTOLIC BLOOD PRESSURE: 64 MMHG

## 2024-09-30 PROCEDURE — G0299 HHS/HOSPICE OF RN EA 15 MIN: HCPCS

## 2024-09-30 ASSESSMENT — ENCOUNTER SYMPTOMS
PAIN LOCATION - PAIN SEVERITY: 5/10
HIGHEST PAIN SEVERITY IN PAST 24 HOURS: 7/10
LOWEST PAIN SEVERITY IN PAST 24 HOURS: 3/10
PAIN: 1
LOWER EXTREMITY EDEMA: 1
PAIN SEVERITY GOAL: 4/10
PAIN LOCATION: CHEST
PERSON REPORTING PAIN: PATIENT
PAIN LOCATION - PAIN SEVERITY: 4/10
MUSCLE WEAKNESS: 1
PAIN LOCATION: LEFT ARM

## 2024-09-30 ASSESSMENT — ACTIVITIES OF DAILY LIVING (ADL)
OASIS_M1830: 05
ENTERING_EXITING_HOME: SUPERVISION

## 2024-09-30 NOTE — PROGRESS NOTES
Discharge Facility: [facility]   Discharge Diagnosis: Coronary artery disease of native artery of native heart  Admission Date: 9/23/24  Discharge Date:  9/28/24    PCP Appointment Date: Marc Harding DO 10/30/24  Specialist Appointment Date:  Cardiac Surg 10/3/24  Cardiology Dr Birmingham 10/4/24  Hospital Encounter and Summary Linked: Yes      Two attempts were made to reach patient within two business days after discharge. Voicemail left with contact information for patient to call back with any non-emergent questions or concerns.

## 2024-10-01 ENCOUNTER — HOME CARE VISIT (OUTPATIENT)
Dept: HOME HEALTH SERVICES | Facility: HOME HEALTH | Age: 64
End: 2024-10-01
Payer: COMMERCIAL

## 2024-10-01 ENCOUNTER — TELEPHONE (OUTPATIENT)
Dept: PRIMARY CARE | Facility: CLINIC | Age: 64
End: 2024-10-01
Payer: COMMERCIAL

## 2024-10-01 PROCEDURE — G0151 HHCP-SERV OF PT,EA 15 MIN: HCPCS

## 2024-10-01 PROCEDURE — G0152 HHCP-SERV OF OT,EA 15 MIN: HCPCS

## 2024-10-01 SDOH — HEALTH STABILITY: PHYSICAL HEALTH: EXERCISE COMMENTS: INSTRUCTED IN HOME PROGRAM OF PROGRESSIVE AMBUL TO MILD FATIGUE SEVERAL X DAY

## 2024-10-01 ASSESSMENT — ACTIVITIES OF DAILY LIVING (ADL)
PHYSICAL TRANSFERS ASSESSED: 1
AMBULATION ASSISTANCE: 1
AMBULATION ASSISTANCE: INDEPENDENT
DRESSING_LB_CURRENT_FUNCTION: INDEPENDENT
AMBULATION ASSISTANCE ON FLAT SURFACES: 1
DRESSING_UB_CURRENT_FUNCTION: INDEPENDENT
TOILETING: 1
CURRENT_FUNCTION: INDEPENDENT
TOILETING: INDEPENDENT

## 2024-10-01 ASSESSMENT — ENCOUNTER SYMPTOMS
SUBJECTIVE PAIN PROGRESSION: GRADUALLY IMPROVING
HIGHEST PAIN SEVERITY IN PAST 24 HOURS: 3/10
PAIN: 1
LOWEST PAIN SEVERITY IN PAST 24 HOURS: 1/10
PERSON REPORTING PAIN: PATIENT
PAIN LOCATION: CHEST
HIGHEST PAIN SEVERITY IN PAST 24 HOURS: 7/10
PAIN LOCATION - PAIN SEVERITY: 4/10
PAIN LOCATION - PAIN QUALITY: STABBING
PAIN LOCATION - PAIN SEVERITY: 2/10
PAIN LOCATION - RELIEVING FACTORS: PAIN MEDS
SUBJECTIVE PAIN PROGRESSION: GRADUALLY IMPROVING
PAIN: 1
PAIN LOCATION - EXACERBATING FACTORS: USING IT
PAIN SEVERITY GOAL: 1/10
PAIN LOCATION: LEFT ARM
PAIN LOCATION - PAIN FREQUENCY: WITH ACTIVITY
PAIN SEVERITY GOAL: 1/10
PAIN LOCATION - PAIN FREQUENCY: INFREQUENT
PAIN LOCATION - EXACERBATING FACTORS: COUGH, SNEEZING
PAIN LOCATION - PAIN QUALITY: TENDERNESS
LOWEST PAIN SEVERITY IN PAST 24 HOURS: 3/10
PERSON REPORTING PAIN: PATIENT
PAIN LOCATION: CHEST

## 2024-10-01 NOTE — TELEPHONE ENCOUNTER
----- Message from Nurse Polina RUIZ sent at 10/1/2024  1:48 PM EDT -----  Regarding: FW: TCM    ----- Message -----  From: Anjelica Chahal RN  Sent: 9/30/2024   3:30 PM EDT  To:  [id number] Clerical  Subject: TCM                                              Hello,    Discharge facility: [facility]   Discharge diagnosis:Coronary artery disease of native artery of native heart     Date of discharge:  9/28/24       Unsuccessful attempts x2 to reach patient for PCP Follow-up  Please have office staff reach out to patient and schedule an appointment within 14 days from discharge date.    Thank you

## 2024-10-02 DIAGNOSIS — E11.9 TYPE 2 DIABETES MELLITUS WITHOUT COMPLICATION, WITHOUT LONG-TERM CURRENT USE OF INSULIN (MULTI): ICD-10-CM

## 2024-10-02 RX ORDER — METFORMIN HYDROCHLORIDE 500 MG/1
TABLET, EXTENDED RELEASE ORAL
Qty: 90 TABLET | Refills: 0 | Status: SHIPPED | OUTPATIENT
Start: 2024-10-02

## 2024-10-02 NOTE — HOME HEALTH
pt lives with wife in split level home, 4 stairs down in garage to enter. pt id self, wife present during session. pt ambul without evice pre hosp admit [phone]. pt to see cardiologist [phone]. uncertain of goals for p.t. as feels is getting around well.

## 2024-10-03 ENCOUNTER — CLINICAL SUPPORT (OUTPATIENT)
Dept: CARDIAC SURGERY | Facility: CLINIC | Age: 64
End: 2024-10-03
Payer: COMMERCIAL

## 2024-10-03 VITALS
DIASTOLIC BLOOD PRESSURE: 62 MMHG | WEIGHT: 224 LBS | SYSTOLIC BLOOD PRESSURE: 112 MMHG | OXYGEN SATURATION: 98 % | TEMPERATURE: 95.7 F | HEART RATE: 92 BPM | BODY MASS INDEX: 30.34 KG/M2 | HEIGHT: 72 IN

## 2024-10-03 DIAGNOSIS — Z95.1 S/P CABG X 2: ICD-10-CM

## 2024-10-03 ASSESSMENT — PATIENT HEALTH QUESTIONNAIRE - PHQ9
SUM OF ALL RESPONSES TO PHQ9 QUESTIONS 1 AND 2: 0
1. LITTLE INTEREST OR PLEASURE IN DOING THINGS: NOT AT ALL
2. FEELING DOWN, DEPRESSED OR HOPELESS: NOT AT ALL

## 2024-10-03 NOTE — PROGRESS NOTES
This 64 year old male status post CABG X 2 with sternal plating per Dr Gan at [facility] on 9/23/24. Patient discharged from hospital on 9/28/24. Patient seen in office as a nurse post operative visit. Midline sternal incision is well approximated with no redness or drainage noted but bruising is noted on bilateral sides of his midline incision.. Right leg vein harvest site is well approximated with no redness or drainage noted. There is bruising on the upper right thigh with a small hematoma noted but he states the bruising has decreased. Left radial incision is slightly red, well approximated and has no drainage. He does complain of a decrease in range of motion in the left lower arm but has been performing physical therapy exercises. Two sutures removed from previous chest tube sites which remain well approximated. Lungs are clear bilaterally. He has generalized complaints of surgical pain at the present time but is tolerable. All medications reviewed and all questions answered. He has no edema of the lower extremities and pedal pulses are palpable bilaterally. He has been taking short walks with some mild shortness of breath. His pulse oximeter was 98% today and he has lost weight since surgery. Heart sounds are normal. Bowel sounds are active with patient reporting normal bowel movements. His appetite has been good with diet reviewed and all questions answered. He is diabetic but does not check his blood sugars. He complained of having cold sweats at home but is afebrile in the office. Instructed patient to follow up with his cardiologist and primary care physician. He has a follow up with Dr Gan with a chest x-ray prior to his visit. Patient instructed to call with any questions or concerns.

## 2024-10-04 ENCOUNTER — APPOINTMENT (OUTPATIENT)
Dept: CARDIOLOGY | Facility: CLINIC | Age: 64
End: 2024-10-04
Payer: COMMERCIAL

## 2024-10-04 ENCOUNTER — LAB (OUTPATIENT)
Dept: LAB | Facility: LAB | Age: 64
End: 2024-10-04
Payer: COMMERCIAL

## 2024-10-04 ENCOUNTER — HOSPITAL ENCOUNTER (OUTPATIENT)
Dept: RADIOLOGY | Facility: HOSPITAL | Age: 64
Discharge: HOME | End: 2024-10-04
Payer: COMMERCIAL

## 2024-10-04 VITALS
HEART RATE: 98 BPM | SYSTOLIC BLOOD PRESSURE: 112 MMHG | DIASTOLIC BLOOD PRESSURE: 64 MMHG | HEIGHT: 72 IN | BODY MASS INDEX: 30.2 KG/M2 | WEIGHT: 223 LBS

## 2024-10-04 DIAGNOSIS — E11.9 TYPE 2 DIABETES MELLITUS WITHOUT COMPLICATION, WITHOUT LONG-TERM CURRENT USE OF INSULIN (MULTI): ICD-10-CM

## 2024-10-04 DIAGNOSIS — Z98.61 HISTORY OF PTCA: ICD-10-CM

## 2024-10-04 DIAGNOSIS — E78.49 OTHER HYPERLIPIDEMIA: ICD-10-CM

## 2024-10-04 DIAGNOSIS — R06.02 SHORTNESS OF BREATH: ICD-10-CM

## 2024-10-04 DIAGNOSIS — I10 PRIMARY HYPERTENSION: ICD-10-CM

## 2024-10-04 DIAGNOSIS — I25.119 CORONARY ARTERY DISEASE INVOLVING NATIVE CORONARY ARTERY OF NATIVE HEART WITH ANGINA PECTORIS: ICD-10-CM

## 2024-10-04 DIAGNOSIS — R61 DIAPHORESIS: ICD-10-CM

## 2024-10-04 DIAGNOSIS — Z86.711 HISTORY OF PULMONARY EMBOLISM: ICD-10-CM

## 2024-10-04 DIAGNOSIS — Z95.1 STATUS POST CORONARY ARTERY BYPASS GRAFT: ICD-10-CM

## 2024-10-04 DIAGNOSIS — I25.2 PAST MYOCARDIAL INFARCTION: ICD-10-CM

## 2024-10-04 LAB
ANION GAP SERPL CALC-SCNC: 12 MMOL/L (ref 10–20)
BNP SERPL-MCNC: 213 PG/ML (ref 0–99)
BUN SERPL-MCNC: 18 MG/DL (ref 6–23)
CALCIUM SERPL-MCNC: 9.2 MG/DL (ref 8.6–10.3)
CHLORIDE SERPL-SCNC: 104 MMOL/L (ref 98–107)
CO2 SERPL-SCNC: 24 MMOL/L (ref 21–32)
CREAT SERPL-MCNC: 0.81 MG/DL (ref 0.5–1.3)
EGFRCR SERPLBLD CKD-EPI 2021: >90 ML/MIN/1.73M*2
ERYTHROCYTE [DISTWIDTH] IN BLOOD BY AUTOMATED COUNT: 14.7 % (ref 11.5–14.5)
GLUCOSE SERPL-MCNC: 125 MG/DL (ref 74–99)
HCT VFR BLD AUTO: 34 % (ref 41–52)
HGB BLD-MCNC: 11.1 G/DL (ref 13.5–17.5)
MCH RBC QN AUTO: 28 PG (ref 26–34)
MCHC RBC AUTO-ENTMCNC: 32.6 G/DL (ref 32–36)
MCV RBC AUTO: 86 FL (ref 80–100)
NRBC BLD-RTO: 0 /100 WBCS (ref 0–0)
PLATELET # BLD AUTO: 118 X10*3/UL (ref 150–450)
POTASSIUM SERPL-SCNC: 4.3 MMOL/L (ref 3.5–5.3)
RBC # BLD AUTO: 3.96 X10*6/UL (ref 4.5–5.9)
SODIUM SERPL-SCNC: 136 MMOL/L (ref 136–145)
TSH SERPL-ACNC: 1.39 MIU/L (ref 0.44–3.98)
WBC # BLD AUTO: 4.9 X10*3/UL (ref 4.4–11.3)

## 2024-10-04 PROCEDURE — 1036F TOBACCO NON-USER: CPT | Performed by: INTERNAL MEDICINE

## 2024-10-04 PROCEDURE — 3044F HG A1C LEVEL LT 7.0%: CPT | Performed by: INTERNAL MEDICINE

## 2024-10-04 PROCEDURE — 3078F DIAST BP <80 MM HG: CPT | Performed by: INTERNAL MEDICINE

## 2024-10-04 PROCEDURE — 84443 ASSAY THYROID STIM HORMONE: CPT

## 2024-10-04 PROCEDURE — 83880 ASSAY OF NATRIURETIC PEPTIDE: CPT

## 2024-10-04 PROCEDURE — 93000 ELECTROCARDIOGRAM COMPLETE: CPT | Performed by: INTERNAL MEDICINE

## 2024-10-04 PROCEDURE — 85027 COMPLETE CBC AUTOMATED: CPT

## 2024-10-04 PROCEDURE — 3074F SYST BP LT 130 MM HG: CPT | Performed by: INTERNAL MEDICINE

## 2024-10-04 PROCEDURE — 80048 BASIC METABOLIC PNL TOTAL CA: CPT

## 2024-10-04 PROCEDURE — 3048F LDL-C <100 MG/DL: CPT | Performed by: INTERNAL MEDICINE

## 2024-10-04 PROCEDURE — 71046 X-RAY EXAM CHEST 2 VIEWS: CPT

## 2024-10-04 PROCEDURE — 3061F NEG MICROALBUMINURIA REV: CPT | Performed by: INTERNAL MEDICINE

## 2024-10-04 PROCEDURE — 3008F BODY MASS INDEX DOCD: CPT | Performed by: INTERNAL MEDICINE

## 2024-10-04 PROCEDURE — 99214 OFFICE O/P EST MOD 30 MIN: CPT | Performed by: INTERNAL MEDICINE

## 2024-10-04 NOTE — PROGRESS NOTES
Patient:  Yuri Reynolds  YOB: 1960  MRN: [id number]       HPI:       Yuri Reynolds is a 64 y.o. male who returns today for cardiac follow-up after recent coronary artery bypass surgery.  He is accompanied by his wife.  He has a history of atherosclerotic heart disease with remote inferior wall myocardial infarction 1999. He had an apical myocardial infarction in August 2003. He underwent angioplasty and stenting of the right coronary artery in 1999 and in 2003. A myocardial perfusion study in April 2015 was negative for ischemia with calculated LV ejection fraction of 65%. He has a history of essential hypertension, type 2 diabetes mellitus, and hyperlipidemia. He stopped smoking tobacco back in January 2017.      He previously noted some intermittent palpitations and a Holter monitor showed normal sinus rhythm with occasional PACs and PVCs. His symptoms correlated with the ectopy. He also had some exertional shortness breath in the setting of progressive weight gain. Extensive pulmonary evaluation was unremarkable. An echocardiogram in August 2017 showed normal LV systolic function and normal valvular structure and function. He was treated in 2019 for a bowel obstruction. He was managed conservatively and it resolved. In the process he was diagnosed with cirrhosis of the liver. He was drinking quite heavily at the time but has completely stopped using any alcohol. He also stopped smoking.       He was seen in the office April 10, 2024 and complained of some exertional shortness of breath, fatigue, and diaphoresis.  He was scheduled for an echocardiogram and Lexiscan myocardial perfusion study.  There were some delays in scheduling.  He was seen in consultation at [facility] on July 22, 2024.  He reported ongoing shortness of breath with lesser amounts of exertion.  He was also experiencing intermittent pressure discomfort in his upper chest over the previous few weeks.  He states on at least 2 occasions  the chest pressure was quite intense and prolonged.  At one point he thought he might be having a heart attack so he decided to come to the emergency department.  Initial EKG showed normal sinus rhythm with some lateral nonspecific ST-T wave changes.  Repeat EKG was normal.  High-sensitivity troponin levels 4 and 4.  CBC and CMP normal with exception of sodium 135.  CT of the chest showed nonenhancement of right lower lobe subsegmental branches suspicious for pulmonary emboli.  He was placed on a heparin drip and admitted to telemetry.  This was subsequently transitioned to [facility].   He denied any recent travel.  He had been active without limitations.  No recent trauma.       At the time of his office visit August 5, 2024 he noted intermittent exertional chest discomfort.  A Lexiscan myocardial perfusion study August 2, 2024 showed a moderate inferoapical myocardial infarction.  No myocardial ischemia.  Calculated LV ejection fraction of 64%.  He does have a prior history of apical myocardial infarction 1999 and inferior myocardial infarction in 2003.  Echocardiogram July 22, 2024 showed an estimated LV ejection fraction 55 to 60% with mild concentric left ventricular hypertrophy.  Trivial to 1+ tricuspid regurgitation with estimated RVSP 23 mmHg.       At the time of his visit August 28, 2024 he described 3 different types of chest discomfort.  He again noted that if he overdid it he would feel a pressure-like discomfort in his upper chest.  This discomfort was radiating into both jaws. He also described some discomfort in the back of his neck, discomfort going down into both forearms, as well as some discomfort in the mid upper abdomen.  No other exacerbating or relieving factors.  The symptoms had improved somewhat after he started on isosorbide mononitrate.  He still felt the need to significantly curtail his activities.  He also described some burning discomfort in his lower chest and upper abdomen when he  would wake up about 5 AM to go to the bathroom.  This is different than the symptoms described above.  He also noted some burning discomfort in his upper chest at times when he is eating.  Some of his symptoms are clearly suggestive of accelerating angina pectoris.  He also has some symptoms suggesting reflux disease and possible gastritis.  He is scheduled to undergo cardiac catheterization along with angioplasty and stenting if deemed necessary on August 30, 2024.  He will hold 2 doses of Eliquis and metformin prior to the procedure.  Imdur was increased to 120 mg daily.      Cardiac cath by Dr. Moser on August 30, 2024 showed 50% stenosis of the mid LAD, 100% stenosis of the mid circumflex, 95% and 80% stenoses of the mid RCA, and 50% stenosis of the PLV B of the RCA.  The circumflex and RCA filled via collaterals.  LV ejection fraction 60%.  Carotid ultrasound showed less than 50% stenosis bilaterally.  He was electively admitted on September 23, 2024 and underwent coronary artery bypass grafting surgery with placement of LIMA to the LAD, radial artery graft to the OM cervical os, and saphenous vein graft to the PDA.  He had sternal plating.  He was treated with postop IV diuretics.  3.7 L negative fluid balance.  He was discharged home on postop day 5.  Hypercoagulable workup was negative.   Lab study September 20, 2024 showed a hemoglobin 11.1 and hematocrit 32.2.  Basic metabolic profile normal with exception of sodium 135 and glucose 116.  Magnesium was 2.08.    The patient states he is doing reasonly well although he is concerned about some persistent shortness of breath with minimal exertions.  He was just discharged home 6 days ago.  He denies any shortness of breath at rest.    He denies any orthopnea, PND, or increasing peripheral edema.  He denies any palpitations, lightheadedness, near-syncope, or syncope.  He denies any fever, chills, or cough.  He denies any nausea or vomiting.  Lab studies  July 26, 2024 showed a cholesterol 124 with HDL 31, LDL 57, and triglycerides 182.  Comprehensive metabolic profile was normal with exception of sodium 135.  Hemoglobin A1c 5.3.  CBC on July 22, 2024 was normal.  BNP was 18.  High-sensitivity troponin level 4.     In light of his complaints of shortness of breath as well as some intermittent episodes of diaphoresis he will be scheduled for labs including a CBC, BMP, BNP, and TSH. Will also check a PA and lateral chest x-ray.  His EKG in the office today shows normal sinus rhythm without any acute ST changes.  Further recommendations pending these results.  He will be enrolled in cardiac rehab.  Other details as noted below.     The above portion of this note was dictated by me using voice recognition software. I personally performed the services described in the documentation. The scribe entering the documentation below was in my presence. I affirm that the information is both accurate and complete.        Objective:     Vitals:    10/04/24 1414   BP: 112/64   Pulse: 98       Wt Readings from Last 4 Encounters:   10/03/24 102 kg (224 lb)   09/28/24 105 kg (230 lb 9.6 oz)   09/16/24 108 kg (237 lb 7 oz)   09/11/24 106 kg (233 lb 1.6 oz)       Allergies:     No Known Allergies       Medications:     Current Outpatient Medications   Medication Instructions    albuterol 90 mcg/actuation inhaler 2 puffs, inhalation, Every 6 hours PRN    apixaban (ELIQUIS) 5 mg, oral, 2 times daily    aspirin 81 mg, oral, Daily    colchicine 0.6 mg, oral, Daily    furosemide (LASIX) 20 mg, oral, Daily PRN    metFORMIN  mg 24 hr tablet TAKE 1 TABLET BY MOUTH ONCE DAILY IN THE EVENING WITH MEALS    metoprolol tartrate (LOPRESSOR) 50 mg, oral, 2 times daily    nitroglycerin (NITROSTAT) 0.4 mg, sublingual, Every 5 min PRN, May repeat dose every 5 minutes for up to 3 doses total.    potassium chloride CR (Klor-Con M20) 20 mEq ER tablet 20 mEq, oral, Daily PRN, Do not crush or chew.     rosuvastatin (CRESTOR) 40 mg, oral, Daily       Physical Examination:   GENERAL:  Well developed, well nourished, in no acute distress.  CHEST:  Symmetric and nontender.  Sternal wound is clean and dry.  NEURO/PSYCH:  Alert and oriented times three with approppriate behavior and responses.  NECK:  Supple, no JVD, no bruit.  LUNGS:  Clear to auscultation bilaterally, normal respiratory effort.  HEART:  Rate and rhythm regular with no evident murmur, no gallop appreciated.        There are no rubs, clicks or heaves.  EXTREMITIES:  Warm with good color, no clubbing or cyanosis.  There is no edema noted.  PERIPHERAL VASCULAR:  Pulses present and equally palpable; 2+ throughout.      Lab:     CBC:   Lab Results   Component Value Date    WBC 6.5 09/28/2024    RBC 3.83 (L) 09/28/2024    HGB 11.1 (L) 09/28/2024    HCT 32.2 (L) 09/28/2024     (L) 09/28/2024        CMP:    Lab Results   Component Value Date     (L) 09/28/2024    K 3.8 09/28/2024     09/28/2024    CO2 25 09/28/2024    BUN 19 09/28/2024    CREATININE 0.80 09/28/2024    GLUCOSE 116 (H) 09/28/2024    CALCIUM 9.2 09/28/2024       Lipid Profile:    Lab Results   Component Value Date    TRIG 182 (H) 07/26/2024    HDL 30.7 07/26/2024    LDLCALC 57 07/26/2024       BMP:  Lab Results   Component Value Date     (L) 09/28/2024     (L) 09/27/2024     (L) 09/26/2024    K 3.8 09/28/2024    K 4.0 09/27/2024    K 4.2 09/26/2024     09/28/2024     09/27/2024     09/26/2024    CO2 25 09/28/2024    CO2 21 09/27/2024    CO2 26 09/26/2024    BUN 19 09/28/2024    BUN 19 09/27/2024    BUN 19 09/26/2024    CREATININE 0.80 09/28/2024    CREATININE 0.70 09/27/2024    CREATININE 0.80 09/26/2024       CBC:  Lab Results   Component Value Date    WBC 6.5 09/28/2024    WBC 6.5 09/27/2024    WBC 6.6 09/26/2024    RBC 3.83 (L) 09/28/2024    RBC 3.60 (L) 09/27/2024    RBC 3.59 (L) 09/26/2024    HGB 11.1 (L) 09/28/2024    HGB 10.3 (L)  09/27/2024    HGB 10.2 (L) 09/26/2024    HCT 32.2 (L) 09/28/2024    HCT 31.4 (L) 09/27/2024    HCT 29.9 (L) 09/26/2024    MCV 84 09/28/2024    MCV 87 09/27/2024    MCV 83 09/26/2024    MCH 29.0 09/28/2024    MCH 28.6 09/27/2024    MCH 28.4 09/26/2024    MCHC 34.5 09/28/2024    MCHC 32.8 09/27/2024    MCHC 34.1 09/26/2024    RDW 14.5 09/28/2024    RDW 14.7 (H) 09/27/2024    RDW 14.3 09/26/2024     (L) 09/28/2024    PLT 76 (L) 09/27/2024    PLT 56 (L) 09/26/2024       Hepatic Function Panel:    Lab Results   Component Value Date    ALKPHOS 75 09/20/2024    ALKPHOS 79 09/20/2024    ALT 29 09/20/2024    ALT 30 09/20/2024    AST 23 09/20/2024    AST 23 09/20/2024    PROT 7.4 09/20/2024    PROT 7.4 09/20/2024    BILITOT 1.0 09/20/2024    BILITOT 1.0 09/20/2024    BILIDIR 0.2 09/20/2024       HgBA1c:    Lab Results   Component Value Date    HGBA1C 5.3 07/26/2024       Magnesium:    Lab Results   Component Value Date    MG 2.08 09/28/2024       TSH:    Lab Results   Component Value Date    TSH 3.11 02/12/2019       BNP:   Lab Results   Component Value Date    BNP 18 07/21/2024        PT/INR:    Lab Results   Component Value Date    PROTIME 16.1 (H) 09/23/2024    INR 1.4 (H) 09/23/2024       Cardiac Enzymes:    Lab Results   Component Value Date    TROPHS 4 07/21/2024    TROPHS 4 07/21/2024         Diagnostic Studies:     ECG 12 lead  Result Date: 9/29/2024    Normal sinus rhythm Possible Inferior infarct , age undetermined Abnormal ECG When compared with ECG of 24-SEP-2024 08:30, (unconfirmed) Borderline criteria for Inferior infarct are now Present Confirmed by Omkar Thorpe ([id number]) on 9/29/2024 8:03:02 PM    XR chest 1 view  Result Date: 9/24/2024    Interpreted By:  Sagar Colón, STUDY: XR CHEST 1 VIEW;  9/24/2024 11:17 am   INDICATION: Signs/Symptoms:chest tube removal.     COMPARISON: Portable chest, earlier same day 24 September 2020 at 0504 hours   ACCESSION NUMBER(S): [id number]   ORDERING CLINICIAN:  JESSI AGUILERA   TECHNIQUE: Single frontal view of the chest; Portable technique   FINDINGS: Left basilar chest tube has been removed   No pneumothorax or any other interval change otherwise       Signed by: Sagar Colón 9/24/2024       Problem List:     Patient Active Problem List   Diagnosis    Abnormal LFTs    Coronary artery disease involving native coronary artery of native heart with angina pectoris    Bilateral sensorineural hearing loss    Chronic nasal congestion    Cirrhosis (Multi)    Cough    Dizziness    Elevated serum creatinine    Eustachian tube dysfunction    Fatty liver    Hearing loss, bilateral    History of PTCA    Hyperlipidemia    Hypersplenism    Hypertension    Inability to maintain erection    Insomnia    Memory changes    Nicotine dependence    Palpitations    Past myocardial infarction    Retained myringotomy tube    Small bowel obstruction (Multi)    Somnolence, daytime    Thrombocytopenia (CMS-HCC)    Type 2 diabetes mellitus without complication, without long-term current use of insulin (Multi)    History of alcohol abuse    Class 1 obesity with serious comorbidity and body mass index (BMI) of 33.0 to 33.9 in adult    Abnormal finding in urine    Alcohol abuse    Anal discharge    Bursitis of elbow    Difficulty breathing    Dysfunction of both eustachian tubes    History of cardiac catheterization    History of ear disorder    History of pulmonary embolism    Inflammatory dermatosis    Lateral epicondylitis of both elbows    Portal hypertension (Multi)    Tinnitus, right ear    Pulmonary embolism without acute cor pulmonale, unspecified chronicity, unspecified pulmonary embolism type (Multi)    Chest pain    Angina pectoris, unstable (Multi)    Coronary artery disease of native artery of native heart with stable angina pectoris    Chest pain, unspecified type       Asessment:     Problem List Items Addressed This Visit             ICD-10-CM    Coronary artery disease involving native  coronary artery of native heart with angina pectoris I25.119    Relevant Orders    Follow Up In Cardiology    [facility]    Basic metabolic panel    TSH    B-Type Natriuretic Peptide    History of PTCA Z98.61    Relevant Orders    Follow Up In Cardiology    [facility]    Basic metabolic panel    TSH    B-Type Natriuretic Peptide    Hyperlipidemia E78.5    Relevant Orders    Follow Up In Cardiology    [facility]    Basic metabolic panel    TSH    B-Type Natriuretic Peptide    Hypertension I10    Relevant Orders    Follow Up In Cardiology    [facility]    Basic metabolic panel    TSH    B-Type Natriuretic Peptide    Past myocardial infarction I25.2    Relevant Orders    Follow Up In Cardiology    [facility]    Basic metabolic panel    TSH    B-Type Natriuretic Peptide    Type 2 diabetes mellitus without complication, without long-term current use of insulin (Multi) E11.9    Relevant Orders    Follow Up In Cardiology    [facility]    Basic metabolic panel    TSH    B-Type Natriuretic Peptide    Shortness of breath R06.02    Relevant Orders    Follow Up In Cardiology    [facility]    Basic metabolic panel    TSH    B-Type Natriuretic Peptide    ECG 12 lead (Clinic Performed) (Completed)    XR chest 2 views    History of pulmonary embolism Z86.711    Relevant Orders    Follow Up In Cardiology    [facility]    Basic metabolic panel    TSH    B-Type Natriuretic Peptide    Status post coronary artery bypass graft Z95.1    Relevant Orders    Follow Up In Cardiology    [facility]    Basic metabolic panel    TSH    B-Type Natriuretic Peptide    Referral to Cardiac Rehab    XR chest 2 views    Diaphoresis R61    Relevant Orders    Follow Up In Cardiology    [facility]    Basic metabolic panel    TSH    B-Type Natriuretic Peptide    XR chest 2 views

## 2024-10-07 DIAGNOSIS — E11.9 TYPE 2 DIABETES MELLITUS WITHOUT COMPLICATION, WITHOUT LONG-TERM CURRENT USE OF INSULIN (MULTI): Primary | ICD-10-CM

## 2024-10-08 ENCOUNTER — HOME CARE VISIT (OUTPATIENT)
Dept: HOME HEALTH SERVICES | Facility: HOME HEALTH | Age: 64
End: 2024-10-08
Payer: COMMERCIAL

## 2024-10-08 VITALS
DIASTOLIC BLOOD PRESSURE: 58 MMHG | TEMPERATURE: 98 F | HEART RATE: 84 BPM | SYSTOLIC BLOOD PRESSURE: 108 MMHG | OXYGEN SATURATION: 98 % | RESPIRATION RATE: 16 BRPM

## 2024-10-08 PROCEDURE — G0299 HHS/HOSPICE OF RN EA 15 MIN: HCPCS

## 2024-10-08 ASSESSMENT — ENCOUNTER SYMPTOMS
LOWER EXTREMITY EDEMA: 1
DYSPNEA ACTIVITY LEVEL: AFTER AMBULATING MORE THAN 20 FT
CHANGE IN APPETITE: UNCHANGED
MUSCLE WEAKNESS: 1
SHORTNESS OF BREATH: 1

## 2024-10-10 ENCOUNTER — PATIENT MESSAGE (OUTPATIENT)
Dept: CARDIOLOGY | Facility: CLINIC | Age: 64
End: 2024-10-10
Payer: COMMERCIAL

## 2024-10-10 DIAGNOSIS — R00.2 PALPITATIONS: ICD-10-CM

## 2024-10-15 ENCOUNTER — HOME CARE VISIT (OUTPATIENT)
Dept: HOME HEALTH SERVICES | Facility: HOME HEALTH | Age: 64
End: 2024-10-15
Payer: COMMERCIAL

## 2024-10-15 ENCOUNTER — PATIENT OUTREACH (OUTPATIENT)
Dept: CARE COORDINATION | Facility: CLINIC | Age: 64
End: 2024-10-15
Payer: COMMERCIAL

## 2024-10-15 PROCEDURE — G0299 HHS/HOSPICE OF RN EA 15 MIN: HCPCS

## 2024-10-15 ASSESSMENT — ENCOUNTER SYMPTOMS: DENIES PAIN: 1

## 2024-10-15 ASSESSMENT — ACTIVITIES OF DAILY LIVING (ADL)
OASIS_M1830: 00
HOME_HEALTH_OASIS: 00

## 2024-10-16 VITALS
RESPIRATION RATE: 16 BRPM | DIASTOLIC BLOOD PRESSURE: 64 MMHG | SYSTOLIC BLOOD PRESSURE: 118 MMHG | OXYGEN SATURATION: 97 % | TEMPERATURE: 97 F | HEART RATE: 79 BPM

## 2024-10-18 DIAGNOSIS — Z48.89 POSTOPERATIVE VISIT: Primary | ICD-10-CM

## 2024-10-21 ENCOUNTER — TELEPHONE (OUTPATIENT)
Dept: CARDIOLOGY | Facility: CLINIC | Age: 64
End: 2024-10-21
Payer: COMMERCIAL

## 2024-10-21 NOTE — TELEPHONE ENCOUNTER
Pt inquiring about a 24 hr Holter that you wanted.    LOV did not state anything in regards to this.  There was no messages sent to pt regarding this either.    Please advise and thank you!

## 2024-10-22 ENCOUNTER — HOSPITAL ENCOUNTER (OUTPATIENT)
Dept: RADIOLOGY | Facility: HOSPITAL | Age: 64
Discharge: HOME | End: 2024-10-22
Payer: COMMERCIAL

## 2024-10-22 ENCOUNTER — OFFICE VISIT (OUTPATIENT)
Dept: CARDIAC SURGERY | Facility: CLINIC | Age: 64
End: 2024-10-22
Payer: COMMERCIAL

## 2024-10-22 VITALS
WEIGHT: 221 LBS | RESPIRATION RATE: 16 BRPM | OXYGEN SATURATION: 98 % | DIASTOLIC BLOOD PRESSURE: 69 MMHG | SYSTOLIC BLOOD PRESSURE: 123 MMHG | HEART RATE: 76 BPM | TEMPERATURE: 96.9 F | HEIGHT: 72 IN | BODY MASS INDEX: 29.93 KG/M2

## 2024-10-22 DIAGNOSIS — Z95.1 S/P CABG X 2: ICD-10-CM

## 2024-10-22 DIAGNOSIS — Z48.89 POSTOPERATIVE VISIT: ICD-10-CM

## 2024-10-22 DIAGNOSIS — Z95.1 S/P CABG X 3: Primary | ICD-10-CM

## 2024-10-22 PROCEDURE — 99211 OFF/OP EST MAY X REQ PHY/QHP: CPT | Performed by: THORACIC SURGERY (CARDIOTHORACIC VASCULAR SURGERY)

## 2024-10-22 PROCEDURE — 71046 X-RAY EXAM CHEST 2 VIEWS: CPT

## 2024-10-22 PROCEDURE — 71046 X-RAY EXAM CHEST 2 VIEWS: CPT | Performed by: RADIOLOGY

## 2024-10-22 ASSESSMENT — ENCOUNTER SYMPTOMS
ENDOCRINE NEGATIVE: 1
EYES NEGATIVE: 1
ALLERGIC/IMMUNOLOGIC NEGATIVE: 1
NEUROLOGICAL NEGATIVE: 1
FATIGUE: 1
HEMATOLOGIC/LYMPHATIC NEGATIVE: 1
PALPITATIONS: 1
MUSCULOSKELETAL NEGATIVE: 1
GASTROINTESTINAL NEGATIVE: 1
SHORTNESS OF BREATH: 1
PSYCHIATRIC NEGATIVE: 1

## 2024-10-22 NOTE — TELEPHONE ENCOUNTER
Pt has been scheduled for holter on 10/31/2024. Left message on pt's voicemail with scheduled appt.

## 2024-10-22 NOTE — PROGRESS NOTES
Subjective   Patient ID: Yuri Reynolds is a 64 y.o. male who presents for Post-op Visit.    HPI  63 yo male with  history of CAD s/p prior MI and stent to RCA, HTN, HLD, T2DM, chronic thrombocytopenia, chronic cirrhosis in setting of prior heavy ETOH use and remote PE on chronic anticoagulation with eliquis.  Pt found to have severe multivessel CAD best ammenable to CABG. After preoperative hematology and hepatology clearance, patient underwent elective CABG X3 with LIMA to LAD, SVG to PDA and radial graft to the OM with sternal plating. His post operative course was uneventful, his eliquis was resumed POD#4, and was discharged to home on POD#5 with [facility]. His post operative recovery has gone well. He has minimal post operative pain and he has be gradually increasing his physical activity. He did notice some occasional episodes of palpitations and is scheduled to have a Holter monitor next week.   Review of Systems   Constitutional:  Positive for fatigue.   HENT: Negative.     Eyes: Negative.    Respiratory:  Positive for shortness of breath.    Cardiovascular:  Positive for palpitations.   Gastrointestinal: Negative.    Endocrine: Negative.    Genitourinary: Negative.    Musculoskeletal: Negative.    Skin: Negative.    Allergic/Immunologic: Negative.    Neurological: Negative.    Hematological: Negative.    Psychiatric/Behavioral: Negative.         Objective   Physical Exam  Constitutional:       General: He is not in acute distress.  HENT:      Head: Normocephalic.      Nose: Nose normal.      Mouth/Throat:      Mouth: Mucous membranes are moist.   Eyes:      Pupils: Pupils are equal, round, and reactive to light.   Cardiovascular:      Rate and Rhythm: Normal rate and regular rhythm.      Pulses: Normal pulses.      Heart sounds: Normal heart sounds.   Pulmonary:      Effort: Pulmonary effort is normal.      Breath sounds: Normal breath sounds.      Comments: Sternum stable, sternotomy well approximated  Abdominal:       General: Bowel sounds are normal.   Musculoskeletal:      Cervical back: Normal range of motion.      Right lower leg: No edema.      Left lower leg: No edema.   Skin:     General: Skin is warm and dry.      Comments: EVH and radial harvest incisions well approximated without erythema   Neurological:      General: No focal deficit present.      Mental Status: He is oriented to person, place, and time.   Psychiatric:         Mood and Affect: Mood normal.         Assessment/Plan     CAD  S/P CABG X3 with LIMA to LAD, radial to OM, and SVG to PDA on 9/23.  -Post operative course stable, may discontinue full sternal precautions, limit lifting less than 10lbs for total of 12 weeks. Stable to drive, may return to work in 2 weeks with light duty for 6 weeks, then may resume full work activities.  -may begin cardiac rehab  -continue follow up with cardiolgoy, cardiac surgery follow up on prn basis only       NAVARRO Kramer-CNP 10/22/24 2:51 PM

## 2024-10-22 NOTE — LETTER
October 22, 2024     Patient: Yuri Reynolds   YOB: 1960   Date of Visit: 10/22/2024       To Whom It May Concern:    It is my medical opinion that Yuri Reynolds may return in 2 weeks on 11/4/24 to work on light duty with no lifting greater than 10 lbs for 6 weeks until 12/16/24 when he can rsume work without restrictions.    If you have any questions or concerns, please don't hesitate to call.         Sincerely,        Freeman Millard MD    CC: No Recipients

## 2024-10-24 ENCOUNTER — CLINICAL SUPPORT (OUTPATIENT)
Dept: CARDIAC REHAB | Facility: HOSPITAL | Age: 64
End: 2024-10-24
Payer: COMMERCIAL

## 2024-10-24 ENCOUNTER — TRANSCRIBE ORDERS (OUTPATIENT)
Dept: CARDIAC REHAB | Facility: HOSPITAL | Age: 64
End: 2024-10-24

## 2024-10-24 DIAGNOSIS — Z95.1 STATUS POST CORONARY ARTERY BYPASS GRAFT: ICD-10-CM

## 2024-10-24 DIAGNOSIS — Z95.1 STATUS POST CORONARY ARTERY BYPASS GRAFT: Primary | ICD-10-CM

## 2024-10-24 NOTE — PROGRESS NOTES
"INDIVIDUAL CARDIAC TREATMENT PLAN-INITIAL ASSESSMENT     Name: Yuri Reynolds   Today's Date: 10/24/24   : 1960    Primary Provider: Dr. Harding  MRN: [id number]    Referring Physician: dr. Birmingham     Diagnosis: CABG    Onset Date: 2024      Risk Stratification: Moderate      NUTRITION ASSESSMENT  Lipids:   Lipid Lab Date: 24  Total Chol: 124  HDL: 30.7  LDL: 57  Tri  Cholesterol Med: Rosuvastatin    Diabetes: Yes  HgbA1c: 5.3  Date Checked: 2024  Monitors glucose at home: Yes- once every 3 weeks  Fasting Blood Sugar Range: 115-130  Frequency:  Hypoglycemic Episode: last episode a month and a half    Weight Management  Weight: 228.8  Height: 6'0\"  BMI:  31  Current Diet: Regular  Barriers to dietary change: none    Initial Dietary Assessment Score: 48%   Discharge Dietary Assessment Score:       NUTRITION PLAN  Nutrition Goals:   1. Improve Picture Your Plate assessment results by discharge.  2. Make changes to diet to include heart healthy options while in the program.    Nutrition Intervention/Education:   Perform weight checks at each session.    OTHER CORE COMPONENTS/ RISK FACTORS ASSESSMENT  Medication compliance: good compliance  Using pill box: Yes  Carries medication list: Yes    Blood Pressure Management:  History of High BP: Yes  Resting BP: 105/63    Tobacco: FORMER  Form of tobacco: Cigarettes  Quit Date:   Anyone in the house smoke: Yes    Initial Knowledge Test Score: 10/15  Discharge Knowledge Test Score:    OTHER CORE COMPONENTS/ RISK FACTOR PLAN   Other Core components/Risk Factor Goals:                                                                                                                                                    1. Achieve and maintain a resting blood pressure less than 130/80 while in the program.  2. Gain knowledge of cardiac disease and lifestyle modifications related to exercise and ADL's prior to discharge.    Other Components/ Risk Factors " Intervention/Education:  *Will continue to monitor HR, BP, dyspnea and arrhythmias each session.   *Will meet with patient to discuss goals & progress.  *Encouraged review of education materials.       PSYCHOSOCIAL ASSESSMENT  Patient reported stress level: mild  Using stress management skills: Yes  HX of anxiety: No  HX of depression: No  Patient reported any new stress, depression and anxiety symptoms: Yes- a little for going back to work    Family/Support System: wife  Seeing mental health provider: No  Psychosocial medications: none    Initial PHQ-9 score: 7  [place] PHQ-9 score:  Discharge PHQ-9 score:   Was PHQ-9 sent to provider: No  Date sent:  To be graded  Quality of Life Survey: SF-36 Pre Post   Physical Component Score TBD TBD   Mental Component Score TBD TBD     Stages of change:  Preparation    PSYCHOSOCIAL PLAN  Psychosocial Goals:  1. Improve stage of change while in the program.  2. To decrease or maintain PHQ-9 score by Discharge.   3. To learn stress management techniques while in the program.    Psychosocial Interventions/ Education:  * Provided one on one emotional support and will facilitate peer support within the context of other phase II patients while in the program.       EXERCISE ASSESSMENT  Home Exercise: Yes  Frequency:   Mode: treadmill    EXERCISE PLAN  Exercise Goals:   1. Goal of increasing METs 5-10% each week while in the program.  2. Have a plan in place for continued exercise after the program by discharge.  3. To increase exercise duration to 30-45 minutes.  Exercise Prescription:   Frequency: 3 days per week  Duration (total aerobic min.): 30 minutes  Intensity RPE: 11-14  Target HR: 20-30 beats above resting heart rate  MET Level Range:     Modality METS Load  Duration   1 Warm Up    05:00   2 NuStep 2.1 32 cheema 2 LV 15:00   3 Recumbent 2.3 6 cheema  15:00   4        5        6 Education    10:00   7 Cool Down     05:00     Exercise Intervention/Education:   *Aim to progress  METS every Weeks or as tolerated.  *Incorporate resistance training for muscular endurance and strength.      Date of first exercise session: Pending MD signature      LEARNING ASSESSMENT & BARRIERS  Readiness to Learn:  Barriers: Hearing problems- wearing hearing aides  Comments:    FALL RISK  low  Comments:        INDIVIDUAL PATIENT GOALS:  To learn more about heart healthy eating.  2.    To get back to regular activities.        MEDICATIONS  Current Outpatient Medications   Medication Instructions    albuterol 90 mcg/actuation inhaler 2 puffs, inhalation, Every 6 hours PRN    apixaban (ELIQUIS) 5 mg, oral, 2 times daily    aspirin 81 mg, oral, Daily    furosemide (LASIX) 20 mg, oral, Daily PRN    metFORMIN  mg 24 hr tablet TAKE 1 TABLET BY MOUTH ONCE DAILY IN THE EVENING WITH MEALS    metoprolol tartrate (LOPRESSOR) 50 mg, oral, 2 times daily    nitroglycerin (NITROSTAT) 0.4 mg, sublingual, Every 5 min PRN, May repeat dose every 5 minutes for up to 3 doses total.    potassium chloride CR (Klor-Con M20) 20 mEq ER tablet 20 mEq, oral, Daily PRN, Do not crush or chew.    rosuvastatin (CRESTOR) 40 mg, oral, Daily                STAFF COMMENTS: Patient reports no angina since prior to CABG. He states he has had some Dyspnea post surgery that he feels is improving. He states he is currently walking every day. His lungs were clear with some crackles in bilateral bases. Spo2 98% on RA. He also states he has had episodes of fluttering in his chest that can last for hours. Doctor is aware and he is getting a heart monitor on oct. 31 . Doctor notification sent to Dr. Birmingham.         CARDIAC ASSESSMENT     Name: Yuri Reynolds   : 1960   Diagnosis: CABG  MRN: [id number]   Onset Date:    Today's Date: 10/24/24      Cardiovascular   HX: CAD, HTN, HLD, and STEMI  Family HX of CAD:  Yes  Angina: chest tightness  Describe:  Last Episode: 2024  History of Heart Failure: No  EF: Over 50%  Onset of HF:  Last HF  hospitalization:  Family HX of HF:  Yes      Devices: denies  HX of PAD: No    Arrythmias: unsure- having afluttering feeling in chest that is lasting hours.  Heart Rate:69  BP:105/63  Radial pulses:  R Present 2+    Comments:      Respiratory  HX: denies  Dyspnea:  Yes  Describe: with exertion  HX CAPRI:  No  CPAP Use:  No  Family History of Lung Disease:  sister had lung CA    Resting O2 sat: 98% RA  Lung Sounds:  Clear with some crackles in bilateral bases      Flu Vaccine: No  Covid Vaccine: Yes  Pneumonia Vaccine: No  Shingles Vaccine: No    Comments:    Neurological   Orientation: oriented to person, place, time, and general circumstances  HX: Vertigo- has occasional in Am. Thinks its from his ears.  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 and walking   Caretaker of family member?:  No  Do you feel safe at home?:  Yes    Caffeinated drinks per day: 1-2  Alcoholic drinks per day/week: denies  HX drug or alcohol abuse: Yes- hx alcohol  Current use of illicit drugs: No  Marijuana use: No    Comments:    Musculoskeletal  HX of injury/surgery: denies    Comments:    Pain Assessment  Current pain: chronic  Location: chest- from surgery  Description: soreness    Comments:    Fall Risk Assessment  Assistive device: no device  Needs assistance:  No  Afraid of falling:  No  Fall within the past 6 months?: No  Injured with fall:  Fall risk results: low

## 2024-10-24 NOTE — PROGRESS NOTES
"INDIVIDUAL CARDIAC TREATMENT PLAN-INITIAL ASSESSMENT     Name: Yuri Reynolds   Today's Date: 10/24/24   : 1960    Primary Provider: Dr. Harding  MRN: [id number]    Referring Physician: dr. Birmingham     Diagnosis: CABG    Onset Date: 2024      Risk Stratification: Moderate      NUTRITION ASSESSMENT  Lipids:   Lipid Lab Date: 24  Total Chol: 124  HDL: 30.7  LDL: 57  Tri  Cholesterol Med: Rosuvastatin    Diabetes: Yes  HgbA1c: 5.3  Date Checked: 2024  Monitors glucose at home: Yes- once every 3 weeks  Fasting Blood Sugar Range: 115-130  Frequency:  Hypoglycemic Episode: last episode a month and a half    Weight Management  Weight: 228.8  Height: 6'0\"  BMI:  31  Current Diet: Regular  Barriers to dietary change: none    Initial Dietary Assessment Score: 48%   Discharge Dietary Assessment Score:       NUTRITION PLAN  Nutrition Goals:   1. Improve Picture Your Plate assessment results by discharge.  2. Make changes to diet to include heart healthy options while in the program.    Nutrition Intervention/Education:   Perform weight checks at each session.    OTHER CORE COMPONENTS/ RISK FACTORS ASSESSMENT  Medication compliance: good compliance  Using pill box: Yes  Carries medication list: Yes    Blood Pressure Management:  History of High BP: Yes  Resting BP: 105/63    Tobacco: FORMER  Form of tobacco: Cigarettes  Quit Date:   Anyone in the house smoke: Yes    Initial Knowledge Test Score: 10/15  Discharge Knowledge Test Score:    OTHER CORE COMPONENTS/ RISK FACTOR PLAN   Other Core components/Risk Factor Goals:                                                                                                                                                    1. Achieve and maintain a resting blood pressure less than 130/80 while in the program.  2. Gain knowledge of cardiac disease and lifestyle modifications related to exercise and ADL's prior to discharge.    Other Components/ Risk Factors " Intervention/Education:  *Will continue to monitor HR, BP, dyspnea and arrhythmias each session.   *Will meet with patient to discuss goals & progress.  *Encouraged review of education materials.       PSYCHOSOCIAL ASSESSMENT  Patient reported stress level: mild  Using stress management skills: Yes  HX of anxiety: No  HX of depression: No  Patient reported any new stress, depression and anxiety symptoms: Yes- a little for going back to work    Family/Support System: wife  Seeing mental health provider: No  Psychosocial medications: none    Initial PHQ-9 score: 7  [place] PHQ-9 score:  Discharge PHQ-9 score:   Was PHQ-9 sent to provider: No  Date sent:  To be graded  Quality of Life Survey: SF-36 Pre Post   Physical Component Score TBD TBD   Mental Component Score TBD TBD     Stages of change:  Preparation    PSYCHOSOCIAL PLAN  Psychosocial Goals:  1. Improve stage of change while in the program.  2. To decrease or maintain PHQ-9 score by Discharge.   3. To learn stress management techniques while in the program.    Psychosocial Interventions/ Education:  * Provided one on one emotional support and will facilitate peer support within the context of other phase II patients while in the program.       EXERCISE ASSESSMENT  Home Exercise: Yes  Frequency:   Mode: treadmill    EXERCISE PLAN  Exercise Goals:   1. Goal of increasing METs 5-10% each week while in the program.  2. Have a plan in place for continued exercise after the program by discharge.  3. To increase exercise duration to 30-45 minutes.  Exercise Prescription:   Frequency: 3 days per week  Duration (total aerobic min.): 30 minutes  Intensity RPE: 11-14  Target HR: 20-30 beats above resting heart rate  MET Level Range:     Modality METS Load  Duration   1 Warm Up    05:00   2 NuStep 2.1 32 cheema 2 LV 15:00   3 Recumbent 2.3 6 cheema  15:00   4        5        6 Education    10:00   7 Cool Down     05:00     Exercise Intervention/Education:   *Aim to progress  METS every Weeks or as tolerated.  *Incorporate resistance training for muscular endurance and strength.      Date of first exercise session: Pending MD signature      LEARNING ASSESSMENT & BARRIERS  Readiness to Learn:  Barriers: Hearing problems- wearing hearing aides  Comments:    FALL RISK  low  Comments:        INDIVIDUAL PATIENT GOALS:  To learn more about heart healthy eating.  2.    To get back to regular activities.        MEDICATIONS  Current Outpatient Medications   Medication Instructions    albuterol 90 mcg/actuation inhaler 2 puffs, inhalation, Every 6 hours PRN    apixaban (ELIQUIS) 5 mg, oral, 2 times daily    aspirin 81 mg, oral, Daily    furosemide (LASIX) 20 mg, oral, Daily PRN    metFORMIN  mg 24 hr tablet TAKE 1 TABLET BY MOUTH ONCE DAILY IN THE EVENING WITH MEALS    metoprolol tartrate (LOPRESSOR) 50 mg, oral, 2 times daily    nitroglycerin (NITROSTAT) 0.4 mg, sublingual, Every 5 min PRN, May repeat dose every 5 minutes for up to 3 doses total.    potassium chloride CR (Klor-Con M20) 20 mEq ER tablet 20 mEq, oral, Daily PRN, Do not crush or chew.    rosuvastatin (CRESTOR) 40 mg, oral, Daily                STAFF COMMENTS: Patient reports no angina since prior to CABG. He states he has had some Dyspnea post surgery that he feels is improving. He states he is currently walking every day. His lungs were clear with some crackles in bilateral bases. Spo2 98% on RA. He also states he has had episodes of fluttering in his chest that can last for hours. Doctor is aware and he is getting a heart monitor on oct. 31 . Doctor notification sent to Dr. Birmingham.         CARDIAC ASSESSMENT     Name: Yuri Reynolds   : 1960   Diagnosis: CABG  MRN: [id number]   Onset Date:    Today's Date: 10/24/24      Cardiovascular   HX: CAD, HTN, HLD, and STEMI  Family HX of CAD:  Yes  Angina: chest tightness  Describe:  Last Episode: 2024  History of Heart Failure: No  EF: Over 50%  Onset of HF:  Last HF  hospitalization:  Family HX of HF:  Yes      Devices: denies  HX of PAD: No    Arrythmias: unsure- having afluttering feeling in chest that is lasting hours.  Heart Rate:69  BP:105/63  Radial pulses:  R Present 2+    Comments:      Respiratory  HX: denies  Dyspnea:  Yes  Describe: with exertion  HX CAPRI:  No  CPAP Use:  No  Family History of Lung Disease:  sister had lung CA    Resting O2 sat: 98% RA  Lung Sounds:  Clear with some crackles in bilateral bases      Flu Vaccine: No  Covid Vaccine: Yes  Pneumonia Vaccine: No  Shingles Vaccine: No    Comments:    Neurological   Orientation: oriented to person, place, time, and general circumstances  HX: Vertigo- has occasional in Am. Thinks its from his ears.  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 and walking   Caretaker of family member?:  No  Do you feel safe at home?:  Yes    Caffeinated drinks per day: 1-2  Alcoholic drinks per day/week: denies  HX drug or alcohol abuse: Yes- hx alcohol  Current use of illicit drugs: No  Marijuana use: No    Comments:    Musculoskeletal  HX of injury/surgery: denies    Comments:    Pain Assessment  Current pain: chronic  Location: chest- from surgery  Description: soreness    Comments:    Fall Risk Assessment  Assistive device: no device  Needs assistance:  No  Afraid of falling:  No  Fall within the past 6 months?: No  Injured with fall:  Fall risk results: low

## 2024-10-28 ENCOUNTER — CLINICAL SUPPORT (OUTPATIENT)
Dept: CARDIAC REHAB | Facility: HOSPITAL | Age: 64
End: 2024-10-28
Payer: COMMERCIAL

## 2024-10-28 ENCOUNTER — APPOINTMENT (OUTPATIENT)
Dept: RADIOLOGY | Facility: HOSPITAL | Age: 64
End: 2024-10-28
Payer: COMMERCIAL

## 2024-10-28 DIAGNOSIS — Z95.1 STATUS POST CORONARY ARTERY BYPASS GRAFT: ICD-10-CM

## 2024-10-28 PROCEDURE — 93798 PHYS/QHP OP CAR RHAB W/ECG: CPT | Performed by: INTERNAL MEDICINE

## 2024-10-29 ENCOUNTER — LAB (OUTPATIENT)
Dept: LAB | Facility: LAB | Age: 64
End: 2024-10-29
Payer: COMMERCIAL

## 2024-10-29 DIAGNOSIS — E11.9 TYPE 2 DIABETES MELLITUS WITHOUT COMPLICATION, WITHOUT LONG-TERM CURRENT USE OF INSULIN (MULTI): ICD-10-CM

## 2024-10-29 DIAGNOSIS — I10 PRIMARY HYPERTENSION: ICD-10-CM

## 2024-10-29 LAB
ANION GAP SERPL CALC-SCNC: 8 MMOL/L (ref 10–20)
BUN SERPL-MCNC: 15 MG/DL (ref 6–23)
CALCIUM SERPL-MCNC: 9.4 MG/DL (ref 8.6–10.3)
CHLORIDE SERPL-SCNC: 106 MMOL/L (ref 98–107)
CO2 SERPL-SCNC: 29 MMOL/L (ref 21–32)
CREAT SERPL-MCNC: 0.9 MG/DL (ref 0.5–1.3)
EGFRCR SERPLBLD CKD-EPI 2021: >90 ML/MIN/1.73M*2
EST. AVERAGE GLUCOSE BLD GHB EST-MCNC: 88 MG/DL
GLUCOSE SERPL-MCNC: 104 MG/DL (ref 74–99)
HBA1C MFR BLD: 4.7 %
POTASSIUM SERPL-SCNC: 4.2 MMOL/L (ref 3.5–5.3)
SODIUM SERPL-SCNC: 139 MMOL/L (ref 136–145)

## 2024-10-29 PROCEDURE — 83036 HEMOGLOBIN GLYCOSYLATED A1C: CPT

## 2024-10-29 PROCEDURE — 80048 BASIC METABOLIC PNL TOTAL CA: CPT

## 2024-10-30 ENCOUNTER — APPOINTMENT (OUTPATIENT)
Dept: PRIMARY CARE | Facility: CLINIC | Age: 64
End: 2024-10-30
Payer: COMMERCIAL

## 2024-10-30 ENCOUNTER — CLINICAL SUPPORT (OUTPATIENT)
Dept: CARDIAC REHAB | Facility: HOSPITAL | Age: 64
End: 2024-10-30
Payer: COMMERCIAL

## 2024-10-30 VITALS
DIASTOLIC BLOOD PRESSURE: 77 MMHG | HEIGHT: 72 IN | BODY MASS INDEX: 30.08 KG/M2 | TEMPERATURE: 98.1 F | SYSTOLIC BLOOD PRESSURE: 130 MMHG | HEART RATE: 83 BPM | WEIGHT: 222.1 LBS | OXYGEN SATURATION: 97 %

## 2024-10-30 DIAGNOSIS — E78.2 MIXED HYPERLIPIDEMIA: ICD-10-CM

## 2024-10-30 DIAGNOSIS — Z95.1 STATUS POST CORONARY ARTERY BYPASS GRAFT: ICD-10-CM

## 2024-10-30 DIAGNOSIS — I25.10 ATHEROSCLEROSIS OF NATIVE CORONARY ARTERY OF NATIVE HEART WITHOUT ANGINA PECTORIS: ICD-10-CM

## 2024-10-30 DIAGNOSIS — Z12.5 PROSTATE CANCER SCREENING: ICD-10-CM

## 2024-10-30 DIAGNOSIS — E11.9 TYPE 2 DIABETES MELLITUS WITHOUT COMPLICATION, WITHOUT LONG-TERM CURRENT USE OF INSULIN (MULTI): ICD-10-CM

## 2024-10-30 DIAGNOSIS — K70.30 ALCOHOLIC CIRRHOSIS OF LIVER WITHOUT ASCITES (MULTI): ICD-10-CM

## 2024-10-30 DIAGNOSIS — E66.811 CLASS 1 OBESITY WITH SERIOUS COMORBIDITY AND BODY MASS INDEX (BMI) OF 32.0 TO 32.9 IN ADULT, UNSPECIFIED OBESITY TYPE: ICD-10-CM

## 2024-10-30 DIAGNOSIS — I26.99 PULMONARY EMBOLISM WITHOUT ACUTE COR PULMONALE, UNSPECIFIED CHRONICITY, UNSPECIFIED PULMONARY EMBOLISM TYPE (MULTI): ICD-10-CM

## 2024-10-30 DIAGNOSIS — D69.6 THROMBOCYTOPENIA (CMS-HCC): ICD-10-CM

## 2024-10-30 DIAGNOSIS — I10 PRIMARY HYPERTENSION: Primary | ICD-10-CM

## 2024-10-30 DIAGNOSIS — J20.9 ACUTE BRONCHITIS, UNSPECIFIED ORGANISM: ICD-10-CM

## 2024-10-30 PROCEDURE — 3008F BODY MASS INDEX DOCD: CPT | Performed by: FAMILY MEDICINE

## 2024-10-30 PROCEDURE — 3075F SYST BP GE 130 - 139MM HG: CPT | Performed by: FAMILY MEDICINE

## 2024-10-30 PROCEDURE — 3044F HG A1C LEVEL LT 7.0%: CPT | Performed by: FAMILY MEDICINE

## 2024-10-30 PROCEDURE — 3078F DIAST BP <80 MM HG: CPT | Performed by: FAMILY MEDICINE

## 2024-10-30 PROCEDURE — 1036F TOBACCO NON-USER: CPT | Performed by: FAMILY MEDICINE

## 2024-10-30 PROCEDURE — 93798 PHYS/QHP OP CAR RHAB W/ECG: CPT | Performed by: INTERNAL MEDICINE

## 2024-10-30 PROCEDURE — 99214 OFFICE O/P EST MOD 30 MIN: CPT | Performed by: FAMILY MEDICINE

## 2024-10-30 PROCEDURE — 3048F LDL-C <100 MG/DL: CPT | Performed by: FAMILY MEDICINE

## 2024-10-30 PROCEDURE — 3061F NEG MICROALBUMINURIA REV: CPT | Performed by: FAMILY MEDICINE

## 2024-10-30 RX ORDER — CEFUROXIME AXETIL 250 MG/1
250 TABLET ORAL 2 TIMES DAILY
Qty: 20 TABLET | Refills: 0 | Status: SHIPPED | OUTPATIENT
Start: 2024-10-30 | End: 2024-11-15 | Stop reason: WASHOUT

## 2024-10-30 RX ORDER — PANTOPRAZOLE SODIUM 40 MG/1
40 TABLET, DELAYED RELEASE ORAL
COMMUNITY
Start: 2024-10-23

## 2024-10-30 RX ORDER — METFORMIN HYDROCHLORIDE 500 MG/1
500 TABLET, EXTENDED RELEASE ORAL
Qty: 90 TABLET | Refills: 0 | Status: SHIPPED | OUTPATIENT
Start: 2024-10-30

## 2024-10-30 NOTE — PATIENT INSTRUCTIONS
Take the antibiotic as prescribed and follow up in 5-7 days of not resolving.    Follow up in 3 months with labs to be done PRIOR.    It was a pleasure to see you today. Thank you for choosing us for your health care needs.    If you have lab or other testing completed and have not been informed of results within one week, please call the office for your results.    If you haven't done so, consider signing up for [facility] [employer], the [facility] personal health record. Ask the staff how you can get started.

## 2024-10-30 NOTE — PROGRESS NOTES
"Subjective   Patient ID: Yuri Reynolds is a 64 y.o. male who presents for Follow-up.    HPI       SINCE LAST SEEN, PT UNDERWENT CABG X3 ON 9/23/2024 with Dr. Millard and Irving LICEA.  Has some chest, shoulder and back pain following surgery that continues to improve.   He is undergoing cardiac rehab.   He is taking Eliquis 1 tablet twice daily.   His metoprolol was changed to 1 pill twice per day.    He states he has had a minor productive cough.  He states his last X-ray (10/22/2024) revealed \"subsegmental airspace disease right lung base.\"    He states he is getting a Holter monitor put on tomorrow.     He states he feels fluid going down the back of his throat.  He had an appointment with Dr. Lewis (ENT) and was prescribed Protonix.    He states he has been walking close to 2 miles a day.    Declines flu vaccine at this time.    Labs: 10/29/2024  Colonoscopy: 3/2019    Patient has hypertension.  Pt does not monitor BP readings at home.   Patient is compliant with his antihypertensive therapy and denies any noted side effects.  He follows up with a cardiologist.      He has hyperlipidemia.  Pt does try to limit the amount of fatty foods and high cholesterol foods that are consumed.  He is compliant with the dosing of his statin therapy and denies any noted side effects.     Patient has DM type 2.   Patient does try to limit the amount of carbohydrates that are consumed.  Denies any hypoglycemic symptoms or readings.  He continues to tolerate the medications well.  He states that he does regularly check his blood sugar.      Patient has coronary artery disease and follows with cardiology as well.  9/23/2024:  Pt underwent CABG x3       He has cirrhosis of the liver.  Patient has continued to avoid alcohol.  He was previously evaluated by hepatology, Dr. Hartmann, but has not followed up since October 2019.  He denies any jaundice or changes in stool/urine color.  Patient also denies any melena or hematochezia.   "   He has had some thrombocytopenia.  He denies any abnormal bruising or bleeding at this time.       Review of Systems  Constitutional: Patient denies any fever, chills, loss of appetite, or unexplained weight loss.  Cardiovascular: Patient denies any chest pain, shortness of breath with exertion, tachycardia, palpitations, orthopnea, or paroxysmal nocturnal dyspnea.    Respiratory: Patient denies any wheezing.  Positive for cough with fluid and mild shortness of breath.    Gastrointestinal: Patient denies any nausea, vomiting, diarrhea, constipation, melena, hematochezia, or reflux symptoms  Skin: Denies any rashes or skin lesions  Neurology: Patient denies any new motor or sensory losses. Denies any numbness, tingling, weakness, and incoordination of the extremities. Patient also denies any tremor, seizures, or gait instability.  Endocrinology: Denies any polyuria, polydipsia, polyphagia, or heat/cold intolerance.      Objective   /77   Pulse 83   Temp 36.7 °C (98.1 °F)   Ht 1.829 m (6')   Wt 101 kg (222 lb 1.6 oz)   SpO2 97%   BMI 30.12 kg/m²     Physical Exam  General Appearance: Alert and cooperative, in no acute distress, well-developed/well-nourished, obese male.    Neck: Supple and without adenopathy or rigidity. There is no JVD at 90° and no carotid bruits are noted. There is no thyromegaly, thyroid tenderness, or palpable thyroid nodules.  Heart: Regular rate and rhythm without murmur or ectopy.    Lungs: Mildly coarse breath sounds on auscultation bilaterally with good air exchange.    Skin: Good turgor, moist, warm and without rashes or lesions.  Neurological exam: Alert and oriented ×3, no tremor, normal gait.  Extremities: No clubbing, cyanosis, or edema        Assessment/Plan     HTN:  Blood pressure appears adequately controlled and we will continue with the current antihypertensive therapy.    Hyperlipidemia:   Stable based on last labs.  Patient will continue with the current  medication.  Dietary changes, exercise, and maintenance of healthy weight were discussed at length.  Lab Results   Component Value Date    CHOL 124 07/26/2024    CHOL 135 12/06/2023    CHOL 129 12/15/2022     Lab Results   Component Value Date    HDL 30.7 07/26/2024    HDL 35.0 12/06/2023    HDL 32.0 (A) 12/15/2022     Lab Results   Component Value Date    LDLCALC 57 07/26/2024    LDLCALC 65 12/06/2023     Lab Results   Component Value Date    TRIG 182 (H) 07/26/2024    TRIG 175 (H) 12/06/2023    TRIG 206 (H) 12/15/2022       Diabetes mellitus type 2:  Lab Results   Component Value Date    HGBA1C 4.7 10/29/2024   Stable based on last labs.  Patient was encouraged to have an annual eye exam and check his feet on a regular basis for callus formation or wounds.    Coronary artery disease: Patient is without worrisome signs or symptoms for unstable angina.  Risk factor modification was discussed at length.  Dietary changes, exercise and maintenance of a healthy weight were discussed.  Underwent CABG times 3 (9/23/2024).    Cirrhosis / Hx of alcohol abuse:   He has continued to abstain from alcohol.  He has delayed follow up with hepatology and has not scheduled the Fibroscan ordered by hepatology.  Pt has unfortunately delayed follow up and testing per hepatology.     Thrombocytopenia: Stable based on most recent lab evaluation.   We will continue to monitor.  Denies any abnormal bruising or bleeding.  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  Lab Results   Component Value Date    WBC 4.9 10/04/2024    HGB 11.1 (L) 10/04/2024    HCT 34.0 (L) 10/04/2024    MCV 86 10/04/2024     (L) 10/04/2024       Pulmonary Embolism:  Pt had a PE earlier 2024 and has been on anticoagulation.  Was referred to hematology at his 7/30/2024 visit.  He had an appt with Dr. Abraham and a hypercoagulable workup which came back normal.   He will continue the Eliquis as  prescribed.    Obesity: Dietary changes, exercise, and maintenance of a healthy weight were discussed at length.  Goal is to achieve a BMI less than 25.     Prostate cancer screening:  Ordered PSA screening lab.     Acute bronchitis:  Will prescribe Ceftin for the cough and congestion.   He will follow up in 5-7 days if not improving.  - cefuroxime (Ceftin) 250 mg tablet; Take 1 tablet (250 mg) by mouth 2 times a day for 10 days. Dispense: 20 tablet; Refill: 0    PSA due 12/7/2024  COLONOSCOPY DUE 3/7/2022    Follow up in 3 months.       Scribe Attestation  By signing my name below, I, [employer]   attest that this documentation has been prepared under the direction and in the presence of Marc Harding DO.    Orders Placed This Encounter   Procedures    Hemoglobin A1C    Basic Metabolic Panel    Lipid Panel    Prostate Specific Antigen, Screen     Requested Prescriptions     Signed Prescriptions Disp Refills    metFORMIN  mg 24 hr tablet 90 tablet 0     Sig: Take 1 tablet (500 mg) by mouth once daily in the evening. Take with meals. Do not crush, chew, or split.    cefuroxime (Ceftin) 250 mg tablet 20 tablet 0     Sig: Take 1 tablet (250 mg) by mouth 2 times a day for 10 days.

## 2024-10-31 ENCOUNTER — APPOINTMENT (OUTPATIENT)
Dept: RADIOLOGY | Facility: HOSPITAL | Age: 64
End: 2024-10-31
Payer: COMMERCIAL

## 2024-10-31 ENCOUNTER — APPOINTMENT (OUTPATIENT)
Dept: CARDIOLOGY | Facility: CLINIC | Age: 64
End: 2024-10-31
Payer: COMMERCIAL

## 2024-10-31 DIAGNOSIS — R00.2 PALPITATIONS: ICD-10-CM

## 2024-10-31 PROCEDURE — 93225 XTRNL ECG REC<48 HRS REC: CPT | Performed by: INTERNAL MEDICINE

## 2024-10-31 PROCEDURE — 93227 XTRNL ECG REC<48 HR R&I: CPT | Performed by: INTERNAL MEDICINE

## 2024-11-01 ENCOUNTER — CLINICAL SUPPORT (OUTPATIENT)
Dept: CARDIAC REHAB | Facility: HOSPITAL | Age: 64
End: 2024-11-01
Payer: COMMERCIAL

## 2024-11-01 DIAGNOSIS — Z95.1 STATUS POST CORONARY ARTERY BYPASS GRAFT: ICD-10-CM

## 2024-11-01 PROCEDURE — 93798 PHYS/QHP OP CAR RHAB W/ECG: CPT | Performed by: INTERNAL MEDICINE

## 2024-11-04 ENCOUNTER — CLINICAL SUPPORT (OUTPATIENT)
Dept: CARDIAC REHAB | Facility: HOSPITAL | Age: 64
End: 2024-11-04
Payer: COMMERCIAL

## 2024-11-04 DIAGNOSIS — Z95.1 STATUS POST CORONARY ARTERY BYPASS GRAFT: ICD-10-CM

## 2024-11-04 PROCEDURE — 93798 PHYS/QHP OP CAR RHAB W/ECG: CPT | Performed by: INTERNAL MEDICINE

## 2024-11-05 ENCOUNTER — HOSPITAL ENCOUNTER (OUTPATIENT)
Dept: RADIOLOGY | Facility: HOSPITAL | Age: 64
Discharge: HOME | End: 2024-11-05
Payer: COMMERCIAL

## 2024-11-05 DIAGNOSIS — K74.60 LIVER DISEASE, CHRONIC, WITH CIRRHOSIS (MULTI): ICD-10-CM

## 2024-11-05 DIAGNOSIS — K76.9 LIVER DISEASE, CHRONIC, WITH CIRRHOSIS (MULTI): ICD-10-CM

## 2024-11-05 PROCEDURE — 76705 ECHO EXAM OF ABDOMEN: CPT

## 2024-11-05 PROCEDURE — 76705 ECHO EXAM OF ABDOMEN: CPT | Performed by: RADIOLOGY

## 2024-11-06 ENCOUNTER — CLINICAL SUPPORT (OUTPATIENT)
Dept: CARDIAC REHAB | Facility: HOSPITAL | Age: 64
End: 2024-11-06
Payer: COMMERCIAL

## 2024-11-06 DIAGNOSIS — Z95.1 STATUS POST CORONARY ARTERY BYPASS GRAFT: ICD-10-CM

## 2024-11-06 PROCEDURE — 93798 PHYS/QHP OP CAR RHAB W/ECG: CPT | Performed by: INTERNAL MEDICINE

## 2024-11-07 ENCOUNTER — DOCUMENTATION (OUTPATIENT)
Dept: GASTROENTEROLOGY | Facility: HOSPITAL | Age: 64
End: 2024-11-07
Payer: COMMERCIAL

## 2024-11-07 DIAGNOSIS — K76.9 LIVER DISEASE, CHRONIC, WITH CIRRHOSIS (MULTI): Primary | ICD-10-CM

## 2024-11-07 DIAGNOSIS — K74.60 LIVER DISEASE, CHRONIC, WITH CIRRHOSIS (MULTI): Primary | ICD-10-CM

## 2024-11-07 DIAGNOSIS — R16.0 LIVER MASS: ICD-10-CM

## 2024-11-07 NOTE — PROGRESS NOTES
BRIEF HEPATOLOGY PHONE NOTE    I contacted Yuri Reynolds today by phone for follow up.  In brief, I saw this patient in September 2024 for history of compensated cirrhosis as well as alcohol related liver disease with metabolic risk factors.  He is being assessed for perioperative mortality prior to cardiac surgery.  The patient underwent a coronary artery bypass in late September 2024 and is doing well postoperative.    Prior to his surgery I ordered an ultrasound and AFP for HCC screening as all patients with cirrhosis should have.  The ultrasound demonstrated hepatomegaly with abnormal echotexture that would be consistent with alcohol related liver disease and cirrhosis.  There was also a lobulated region within the tani hepatis measuring 2.4 x 2.8 cm of unclear etiology.  There is a concern that this could represent a hepatocellular carcinoma versus an enlarged tani hepatis lymph node.    I have ordered repeat laboratory studies include CBC, CMP, INR, AFP as well as an MRI liver protocol for reassessment.  I will meet with the patient in person to review these results and potentially review the case at tumor board.    Dennis Everett MD

## 2024-11-08 ENCOUNTER — CLINICAL SUPPORT (OUTPATIENT)
Dept: CARDIAC REHAB | Facility: HOSPITAL | Age: 64
End: 2024-11-08
Payer: COMMERCIAL

## 2024-11-08 DIAGNOSIS — Z95.1 STATUS POST CORONARY ARTERY BYPASS GRAFT: ICD-10-CM

## 2024-11-15 ENCOUNTER — LAB (OUTPATIENT)
Dept: LAB | Facility: LAB | Age: 64
End: 2024-11-15
Payer: COMMERCIAL

## 2024-11-15 ENCOUNTER — TELEPHONE (OUTPATIENT)
Dept: CARDIOLOGY | Facility: CLINIC | Age: 64
End: 2024-11-15

## 2024-11-15 ENCOUNTER — APPOINTMENT (OUTPATIENT)
Dept: CARDIOLOGY | Facility: CLINIC | Age: 64
End: 2024-11-15
Payer: COMMERCIAL

## 2024-11-15 VITALS
WEIGHT: 223 LBS | BODY MASS INDEX: 30.2 KG/M2 | DIASTOLIC BLOOD PRESSURE: 60 MMHG | HEIGHT: 72 IN | SYSTOLIC BLOOD PRESSURE: 102 MMHG | HEART RATE: 70 BPM

## 2024-11-15 DIAGNOSIS — Z95.1 STATUS POST CORONARY ARTERY BYPASS GRAFT: ICD-10-CM

## 2024-11-15 DIAGNOSIS — Z98.61 HISTORY OF PTCA: Primary | ICD-10-CM

## 2024-11-15 DIAGNOSIS — I10 PRIMARY HYPERTENSION: ICD-10-CM

## 2024-11-15 DIAGNOSIS — K76.9 LIVER DISEASE, CHRONIC, WITH CIRRHOSIS (MULTI): ICD-10-CM

## 2024-11-15 DIAGNOSIS — I25.119 CORONARY ARTERY DISEASE INVOLVING NATIVE CORONARY ARTERY OF NATIVE HEART WITH ANGINA PECTORIS: ICD-10-CM

## 2024-11-15 DIAGNOSIS — R00.2 PALPITATIONS: ICD-10-CM

## 2024-11-15 DIAGNOSIS — K74.60 LIVER DISEASE, CHRONIC, WITH CIRRHOSIS (MULTI): ICD-10-CM

## 2024-11-15 DIAGNOSIS — R16.0 LIVER MASS: ICD-10-CM

## 2024-11-15 DIAGNOSIS — E78.49 OTHER HYPERLIPIDEMIA: ICD-10-CM

## 2024-11-15 DIAGNOSIS — I26.99 PULMONARY EMBOLISM WITHOUT ACUTE COR PULMONALE, UNSPECIFIED CHRONICITY, UNSPECIFIED PULMONARY EMBOLISM TYPE (MULTI): ICD-10-CM

## 2024-11-15 PROBLEM — I25.10 ATHEROSCLEROSIS OF NATIVE CORONARY ARTERY OF NATIVE HEART WITHOUT ANGINA PECTORIS: Status: ACTIVE | Noted: 2024-09-03

## 2024-11-15 LAB
ALBUMIN SERPL BCP-MCNC: 4.6 G/DL (ref 3.4–5)
ALP SERPL-CCNC: 96 U/L (ref 33–136)
ALT SERPL W P-5'-P-CCNC: 32 U/L (ref 10–52)
ANION GAP SERPL CALC-SCNC: 9 MMOL/L (ref 10–20)
AST SERPL W P-5'-P-CCNC: 26 U/L (ref 9–39)
BILIRUB SERPL-MCNC: 0.8 MG/DL (ref 0–1.2)
BUN SERPL-MCNC: 19 MG/DL (ref 6–23)
CALCIUM SERPL-MCNC: 9.4 MG/DL (ref 8.6–10.3)
CHLORIDE SERPL-SCNC: 106 MMOL/L (ref 98–107)
CO2 SERPL-SCNC: 28 MMOL/L (ref 21–32)
CREAT SERPL-MCNC: 0.9 MG/DL (ref 0.5–1.3)
EGFRCR SERPLBLD CKD-EPI 2021: >90 ML/MIN/1.73M*2
ERYTHROCYTE [DISTWIDTH] IN BLOOD BY AUTOMATED COUNT: 13.9 % (ref 11.5–14.5)
GLUCOSE SERPL-MCNC: 82 MG/DL (ref 74–99)
HCT VFR BLD AUTO: 39.6 % (ref 41–52)
HGB BLD-MCNC: 12.6 G/DL (ref 13.5–17.5)
INR PPP: 1.5 (ref 0.9–1.1)
MCH RBC QN AUTO: 25.8 PG (ref 26–34)
MCHC RBC AUTO-ENTMCNC: 31.8 G/DL (ref 32–36)
MCV RBC AUTO: 81 FL (ref 80–100)
NRBC BLD-RTO: 0 /100 WBCS (ref 0–0)
PLATELET # BLD AUTO: 79 X10*3/UL (ref 150–450)
POTASSIUM SERPL-SCNC: 4.3 MMOL/L (ref 3.5–5.3)
PROT SERPL-MCNC: 6.9 G/DL (ref 6.4–8.2)
PROTHROMBIN TIME: 16.5 SECONDS (ref 9.8–12.8)
RBC # BLD AUTO: 4.88 X10*6/UL (ref 4.5–5.9)
SODIUM SERPL-SCNC: 139 MMOL/L (ref 136–145)
WBC # BLD AUTO: 3.9 X10*3/UL (ref 4.4–11.3)

## 2024-11-15 PROCEDURE — 3044F HG A1C LEVEL LT 7.0%: CPT | Performed by: INTERNAL MEDICINE

## 2024-11-15 PROCEDURE — 85027 COMPLETE CBC AUTOMATED: CPT

## 2024-11-15 PROCEDURE — 82105 ALPHA-FETOPROTEIN SERUM: CPT

## 2024-11-15 PROCEDURE — 80053 COMPREHEN METABOLIC PANEL: CPT

## 2024-11-15 PROCEDURE — 3078F DIAST BP <80 MM HG: CPT | Performed by: INTERNAL MEDICINE

## 2024-11-15 PROCEDURE — 3074F SYST BP LT 130 MM HG: CPT | Performed by: INTERNAL MEDICINE

## 2024-11-15 PROCEDURE — 3048F LDL-C <100 MG/DL: CPT | Performed by: INTERNAL MEDICINE

## 2024-11-15 PROCEDURE — 85610 PROTHROMBIN TIME: CPT

## 2024-11-15 PROCEDURE — 3008F BODY MASS INDEX DOCD: CPT | Performed by: INTERNAL MEDICINE

## 2024-11-15 PROCEDURE — 3061F NEG MICROALBUMINURIA REV: CPT | Performed by: INTERNAL MEDICINE

## 2024-11-15 PROCEDURE — 1036F TOBACCO NON-USER: CPT | Performed by: INTERNAL MEDICINE

## 2024-11-15 PROCEDURE — 99214 OFFICE O/P EST MOD 30 MIN: CPT | Performed by: INTERNAL MEDICINE

## 2024-11-15 RX ORDER — LANOLIN ALCOHOL/MO/W.PET/CERES
400 CREAM (GRAM) TOPICAL DAILY
Qty: 90 TABLET | Refills: 3 | Status: SHIPPED | OUTPATIENT
Start: 2024-11-15 | End: 2025-11-15

## 2024-11-15 RX ORDER — ATENOLOL 50 MG/1
50 TABLET ORAL DAILY
Qty: 90 TABLET | Refills: 3 | Status: SHIPPED | OUTPATIENT
Start: 2024-11-15 | End: 2025-11-15

## 2024-11-15 NOTE — PROGRESS NOTES
Patient:  Yuri Reynolds  YOB: 1960  MRN: [id number]       HPI:       Yuri Reynolds is a 64 y.o. male who returns today for cardiac follow-up.  He has a history of atherosclerotic heart disease with remote inferior wall myocardial infarction 1999. He had an apical myocardial infarction in August 2003. He underwent angioplasty and stenting of the right coronary artery in 1999 and in 2003. A myocardial perfusion study in April 2015 was negative for ischemia with calculated LV ejection fraction of 65%. He has a history of essential hypertension, type 2 diabetes mellitus, and hyperlipidemia. He stopped smoking tobacco back in January 2017.      He previously noted some intermittent palpitations and a Holter monitor showed normal sinus rhythm with occasional PACs and PVCs. His symptoms correlated with the ectopy. He also had some exertional shortness breath in the setting of progressive weight gain. Extensive pulmonary evaluation was unremarkable. An echocardiogram in August 2017 showed normal LV systolic function and normal valvular structure and function. He was treated in 2019 for a bowel obstruction. He was managed conservatively and it resolved. In the process he was diagnosed with cirrhosis of the liver. He was drinking quite heavily at the time but has completely stopped using any alcohol. He also stopped smoking.       He was seen in the office April 10, 2024 and complained of some exertional shortness of breath, fatigue, and diaphoresis.  He was scheduled for an echocardiogram and Lexiscan myocardial perfusion study.  There were some delays in scheduling.  He was seen in consultation at [facility] on July 22, 2024.  He reported ongoing shortness of breath with lesser amounts of exertion.  He was also experiencing intermittent pressure discomfort in his upper chest over the previous few weeks.  He noted that on at least 2 occasions the chest pressure was quite intense and prolonged.  At one point he  thought he might be having a heart attack so he decided to come to the emergency department.  Initial EKG showed normal sinus rhythm with some lateral nonspecific ST-T wave changes.  Repeat EKG was normal.  High-sensitivity troponin levels 4 and 4.  CBC and CMP normal with exception of sodium 135.  CT of the chest showed nonenhancement of right lower lobe subsegmental branches suspicious for pulmonary emboli.  He was placed on a heparin drip and admitted to telemetry.  This was subsequently transitioned to [facility].   He had not had any recent travel.  He was active without limitations.  No recent trauma.       At the time of his office visit August 5, 2024 he noted intermittent exertional chest discomfort.  A Lexiscan myocardial perfusion study August 2, 2024 showed a moderate inferoapical myocardial infarction.  No myocardial ischemia.  Calculated LV ejection fraction of 64%.  He has a prior history of apical myocardial infarction 1999 and inferior myocardial infarction in 2003.  Echocardiogram July 22, 2024 showed an estimated LV ejection fraction 55 to 60% with mild concentric left ventricular hypertrophy.  Trivial to 1+ tricuspid regurgitation with estimated RVSP 23 mmHg.       At the time of his visit August 28, 2024 he described 3 different types of chest discomfort.  He again noted that if he overdid it he would feel a pressure-like discomfort in his upper chest.  This discomfort was radiating into both jaws. He also described some discomfort in the back of his neck, discomfort going down into both forearms, as well as some discomfort in the mid upper abdomen.  No other exacerbating or relieving factors.  The symptoms had improved somewhat after he started on isosorbide mononitrate.  He still felt the need to significantly curtail his activities.  He also described some burning discomfort in his lower chest and upper abdomen when he would wake up about 5 AM to go to the bathroom.  This is different than the  symptoms described above.  He also noted some burning discomfort in his upper chest at times when he is eating.  Some of his symptoms are clearly suggestive of accelerating angina pectoris.  He also has some symptoms suggesting reflux disease and possible gastritis.  He is scheduled to undergo cardiac catheterization along with angioplasty and stenting if deemed necessary on August 30, 2024.  He will hold 2 doses of Eliquis and metformin prior to the procedure.  Imdur was increased to 120 mg daily.       Cardiac cath by Dr. Moser on August 30, 2024 showed 50% stenosis of the mid LAD, 100% stenosis of the mid circumflex, 95% and 80% stenoses of the mid RCA, and 50% stenosis of the PLV B of the RCA.  The circumflex and RCA filled via collaterals.  LV ejection fraction 60%.  Carotid ultrasound showed less than 50% stenosis bilaterally.  He was electively admitted on September 23, 2024 and underwent coronary artery bypass grafting surgery with placement of LIMA to the LAD, radial artery graft to the OM cervical os, and saphenous vein graft to the PDA.  He had sternal plating.  He was treated with postop IV diuretics.  3.7 L negative fluid balance.  He was discharged home on postop day 5.  Hypercoagulable workup was negative.   Lab study September 20, 2024 showed a hemoglobin 11.1 and hematocrit 32.2.  Basic metabolic profile normal with exception of sodium 135 and glucose 116.  Magnesium was 2.08.     At the time of his most recent follow-up visit he related concern about some persistent shortness of breath with minimal exertions.  He had just been discharged to home 6 days earlier.   PA and lateral chest x-ray October 22, 2024 showed right basilar subsegmental atelectasis.  Holter monitor October 31, 2024 showed normal sinus rhythm with average heart rate of 72 bpm.  Heart rates range from  beats minute.  PVCs comprise 1% of the overall rhythm.  He had 1 4 beat run of wide-complex tachycardia at 114 bpm.   7 couplets.  1 4 beat run of atrial tachycardia at 132 bpm.  No specific correlation with his symptoms. Lab studies October 4, 2024 showed hemoglobin 11.1 and hematocrit 34.0.  Basic metabolic profile was normal with exception of glucose 125.  TSH was normal.  BNP level 213.  Hemoglobin A1c 4.7.     He did cardiac rehab for 2 weeks and then returned to work.  He works as a .  He does not generally have to do any heavy lifting.  He does note some persistent discomfort over the left pectoral region.  It is worse with deep inspiration and with palpation.  He clearly sounds musculoskeletal in nature.  No chest pressure or tightening.  He has some persistent mild exertional shortness of breath.  He noted this prior to his bypass surgery.  He also notes persistent palpitations.  He feels hard with skipped beats.  Occasional brief fluttering sensation.  He denies any shortness of breath at rest.   He denies any orthopnea, PND, or increasing peripheral edema.  He denies any palpitations, lightheadedness, near-syncope, or syncope.  He denies any fever, chills, or cough.  He denies any nausea or vomiting.  He will be scheduled for a 7-day Zio patch monitor in an effort to better correlate his symptoms with significant dysrhythmia.  He will change from metoprolol to atenolol 50 mg daily.  I advised him to start on magnesium oxide 400 mg daily.  Other details as noted below.     The above portion of this note was dictated by me using voice recognition software. I personally performed the services described in the documentation. The scribe entering the documentation below was in my presence. I affirm that the information is both accurate and complete.       Objective:     Vitals:    11/15/24 1341   BP: 102/60   Pulse: 70       Wt Readings from Last 4 Encounters:   11/15/24 101 kg (223 lb)   10/30/24 101 kg (222 lb 1.6 oz)   10/22/24 100 kg (221 lb)   10/04/24 101 kg (223 lb)       Allergies:     No Known Allergies        Medications:     Current Outpatient Medications   Medication Instructions    albuterol 90 mcg/actuation inhaler 2 puffs, inhalation, Every 6 hours PRN    apixaban (ELIQUIS) 5 mg, oral, 2 times daily    furosemide (LASIX) 20 mg, oral, Daily PRN    metFORMIN XR (GLUCOPHAGE-XR) 500 mg, oral, Daily with evening meal, Do not crush, chew, or split.    metoprolol tartrate (LOPRESSOR) 50 mg, oral, 2 times daily    nitroglycerin (NITROSTAT) 0.4 mg, sublingual, Every 5 min PRN, May repeat dose every 5 minutes for up to 3 doses total.    pantoprazole (PROTONIX) 40 mg, Daily RT    potassium chloride CR (Klor-Con M20) 20 mEq ER tablet 20 mEq, oral, Daily PRN, Do not crush or chew.    rosuvastatin (CRESTOR) 40 mg, oral, Daily       Physical Examination:   GENERAL:  Well developed, well nourished, in no acute distress.  CHEST:  Symmetric and nontender.  NEURO/PSYCH:  Alert and oriented times three with approppriate behavior and responses.  NECK:  Supple, no JVD, no bruit.  LUNGS:  Clear to auscultation bilaterally, normal respiratory effort.  HEART:  Rate and rhythm regular with no evident murmur, no gallop appreciated.        There are no rubs, clicks or heaves.  EXTREMITIES:  Warm with good color, no clubbing or cyanosis.  There is no edema noted.  PERIPHERAL VASCULAR:  Pulses present and equally palpable; 2+ throughout.      Lab:     CBC:   Lab Results   Component Value Date    WBC 4.9 10/04/2024    RBC 3.96 (L) 10/04/2024    HGB 11.1 (L) 10/04/2024    HCT 34.0 (L) 10/04/2024     (L) 10/04/2024        CMP:    Lab Results   Component Value Date     10/29/2024    K 4.2 10/29/2024     10/29/2024    CO2 29 10/29/2024    BUN 15 10/29/2024    CREATININE 0.90 10/29/2024    GLUCOSE 104 (H) 10/29/2024    CALCIUM 9.4 10/29/2024       Lipid Profile:    Lab Results   Component Value Date    TRIG 182 (H) 07/26/2024    HDL 30.7 07/26/2024    LDLCALC 57 07/26/2024       BMP:  Lab Results   Component Value Date    NA  139 10/29/2024     10/04/2024     (L) 09/28/2024    K 4.2 10/29/2024    K 4.3 10/04/2024    K 3.8 09/28/2024     10/29/2024     10/04/2024     09/28/2024    CO2 29 10/29/2024    CO2 24 10/04/2024    CO2 25 09/28/2024    BUN 15 10/29/2024    BUN 18 10/04/2024    BUN 19 09/28/2024    CREATININE 0.90 10/29/2024    CREATININE 0.81 10/04/2024    CREATININE 0.80 09/28/2024       CBC:  Lab Results   Component Value Date    WBC 4.9 10/04/2024    WBC 6.5 09/28/2024    WBC 6.5 09/27/2024    RBC 3.96 (L) 10/04/2024    RBC 3.83 (L) 09/28/2024    RBC 3.60 (L) 09/27/2024    HGB 11.1 (L) 10/04/2024    HGB 11.1 (L) 09/28/2024    HGB 10.3 (L) 09/27/2024    HCT 34.0 (L) 10/04/2024    HCT 32.2 (L) 09/28/2024    HCT 31.4 (L) 09/27/2024    MCV 86 10/04/2024    MCV 84 09/28/2024    MCV 87 09/27/2024    MCH 28.0 10/04/2024    MCH 29.0 09/28/2024    MCH 28.6 09/27/2024    MCHC 32.6 10/04/2024    MCHC 34.5 09/28/2024    MCHC 32.8 09/27/2024    RDW 14.7 (H) 10/04/2024    RDW 14.5 09/28/2024    RDW 14.7 (H) 09/27/2024     (L) 10/04/2024     (L) 09/28/2024    PLT 76 (L) 09/27/2024       Hepatic Function Panel:    Lab Results   Component Value Date    ALKPHOS 75 09/20/2024    ALKPHOS 79 09/20/2024    ALT 29 09/20/2024    ALT 30 09/20/2024    AST 23 09/20/2024    AST 23 09/20/2024    PROT 7.4 09/20/2024    PROT 7.4 09/20/2024    BILITOT 1.0 09/20/2024    BILITOT 1.0 09/20/2024    BILIDIR 0.2 09/20/2024       HgBA1c:    Lab Results   Component Value Date    HGBA1C 4.7 10/29/2024       Magnesium:    Lab Results   Component Value Date    MG 2.08 09/28/2024       TSH:    Lab Results   Component Value Date    TSH 1.39 10/04/2024       BNP:   Lab Results   Component Value Date     (H) 10/04/2024        PT/INR:    Lab Results   Component Value Date    PROTIME 16.1 (H) 09/23/2024    INR 1.4 (H) 09/23/2024       Cardiac Enzymes:    Lab Results   Component Value Date    TROPHS 4 07/21/2024    TROPHS 4  07/21/2024       Problem List:     Patient Active Problem List   Diagnosis    Abnormal LFTs    Coronary artery disease involving native coronary artery of native heart with angina pectoris    Bilateral sensorineural hearing loss    Chronic nasal congestion    Cirrhosis (Multi)    Cough    Dizziness    Elevated serum creatinine    Eustachian tube dysfunction    Fatty liver    Hearing loss, bilateral    History of PTCA    Hyperlipidemia    Hypersplenism    Hypertension    Inability to maintain erection    Insomnia    Memory changes    Nicotine dependence    Palpitations    Past myocardial infarction    Retained myringotomy tube    Small bowel obstruction (Multi)    Somnolence, daytime    Thrombocytopenia (CMS-HCC)    Type 2 diabetes mellitus without complication, without long-term current use of insulin (Multi)    History of alcohol abuse    Class 1 obesity with serious comorbidity and body mass index (BMI) of 33.0 to 33.9 in adult    Abnormal finding in urine    Alcohol abuse    Anal discharge    Bursitis of elbow    Shortness of breath    Dysfunction of both eustachian tubes    History of cardiac catheterization    History of ear disorder    History of pulmonary embolism    Inflammatory dermatosis    Lateral epicondylitis of both elbows    Portal hypertension (Multi)    Tinnitus, right ear    Pulmonary embolism without acute cor pulmonale, unspecified chronicity, unspecified pulmonary embolism type (Multi)    Chest pain    Angina pectoris, unstable (Multi)    Coronary artery disease of native artery of native heart with stable angina pectoris    Chest pain, unspecified type    Status post coronary artery bypass graft    Diaphoresis       Asessment:     Problem List Items Addressed This Visit             ICD-10-CM    Coronary artery disease involving native coronary artery of native heart with angina pectoris I25.119    Relevant Medications    atenolol (Tenormin) 50 mg tablet    Other Relevant Orders    Follow Up In  Cardiology    History of PTCA - Primary Z98.61    Hyperlipidemia E78.5    Hypertension I10    Palpitations R00.2    Relevant Medications    magnesium oxide (Mag-Ox) 400 mg (241.3 mg magnesium) tablet    atenolol (Tenormin) 50 mg tablet    Other Relevant Orders    Holter Or Event Cardiac Monitor    Follow Up In Cardiology    Pulmonary embolism without acute cor pulmonale, unspecified chronicity, unspecified pulmonary embolism type (Multi) I26.99    Status post coronary artery bypass graft Z95.1

## 2024-11-15 NOTE — PATIENT INSTRUCTIONS
Follow up after heart monitor testing complete     Start Magnesium oxide 400 mg daily     Stop taking Toprolol XL and start taking Atenolol 50mg once daily     DID YOU KNOW  We have a pharmacy here in the [facility].  They can fill all prescriptions, not just cardiac medications.  Prescriptions from other pharmacies can easily be transferred to the  pharmacy by the  pharmacist on site.   pharmacies offer FREE HOME DELIVERY on medications to anywhere in [place]. They can sync your medications. Typically prescriptions can be ready in 10 - 15 minutes. If pharmacy is unable to fill your  prescription or if cost is more than your paying now the Pharmacist can easily transfer back to your Pharmacy of choice. Pharmacy phone # [phone].     Please bring all medicines, vitamins, and herbal supplements with you in original bottles to every appointment  Prescriptions will not be filled unless you are compliant with your follow up appointments or have a follow up appointment scheduled as per instruction of your physician. Refills should be requested at the time of your visit.

## 2024-11-15 NOTE — TELEPHONE ENCOUNTER
7 day Elizabeth [id number] does not require prior auth per Patti LEW at [facility] call ref#[id number].

## 2024-11-16 ENCOUNTER — HOSPITAL ENCOUNTER (OUTPATIENT)
Dept: RADIOLOGY | Facility: HOSPITAL | Age: 64
Discharge: HOME | End: 2024-11-16
Payer: COMMERCIAL

## 2024-11-16 DIAGNOSIS — K76.9 LIVER DISEASE, CHRONIC, WITH CIRRHOSIS (MULTI): ICD-10-CM

## 2024-11-16 DIAGNOSIS — K74.60 LIVER DISEASE, CHRONIC, WITH CIRRHOSIS (MULTI): ICD-10-CM

## 2024-11-16 DIAGNOSIS — R16.0 LIVER MASS: ICD-10-CM

## 2024-11-16 LAB — AFP SERPL-MCNC: <4 NG/ML (ref 0–9)

## 2024-11-16 RX ORDER — GADOTERATE MEGLUMINE 376.9 MG/ML
0.2 INJECTION INTRAVENOUS
Status: DISCONTINUED | OUTPATIENT
Start: 2024-11-16 | End: 2024-11-17 | Stop reason: HOSPADM

## 2024-11-18 DIAGNOSIS — K76.9 LIVER DISEASE, CHRONIC, WITH CIRRHOSIS (MULTI): ICD-10-CM

## 2024-11-18 DIAGNOSIS — R16.0 LIVER MASS: ICD-10-CM

## 2024-11-18 DIAGNOSIS — K74.60 LIVER DISEASE, CHRONIC, WITH CIRRHOSIS (MULTI): ICD-10-CM

## 2024-12-02 ENCOUNTER — HOSPITAL ENCOUNTER (OUTPATIENT)
Dept: RADIOLOGY | Facility: HOSPITAL | Age: 64
Discharge: HOME | End: 2024-12-02
Payer: COMMERCIAL

## 2024-12-02 DIAGNOSIS — K74.60 LIVER DISEASE, CHRONIC, WITH CIRRHOSIS (MULTI): ICD-10-CM

## 2024-12-02 DIAGNOSIS — K76.9 LIVER DISEASE, CHRONIC, WITH CIRRHOSIS (MULTI): ICD-10-CM

## 2024-12-02 DIAGNOSIS — R16.0 LIVER MASS: ICD-10-CM

## 2024-12-02 PROCEDURE — 74183 MRI ABD W/O CNTR FLWD CNTR: CPT

## 2024-12-02 PROCEDURE — 74183 MRI ABD W/O CNTR FLWD CNTR: CPT | Performed by: RADIOLOGY

## 2024-12-02 PROCEDURE — A9575 INJ GADOTERATE MEGLUMI 0.1ML: HCPCS | Performed by: STUDENT IN AN ORGANIZED HEALTH CARE EDUCATION/TRAINING PROGRAM

## 2024-12-02 PROCEDURE — 2550000001 HC RX 255 CONTRASTS: Performed by: STUDENT IN AN ORGANIZED HEALTH CARE EDUCATION/TRAINING PROGRAM

## 2024-12-02 RX ORDER — GADOTERATE MEGLUMINE 376.9 MG/ML
20 INJECTION INTRAVENOUS
Status: COMPLETED | OUTPATIENT
Start: 2024-12-02 | End: 2024-12-02

## 2024-12-09 ENCOUNTER — APPOINTMENT (OUTPATIENT)
Dept: CARDIOLOGY | Facility: CLINIC | Age: 64
End: 2024-12-09
Payer: COMMERCIAL

## 2024-12-23 ENCOUNTER — APPOINTMENT (OUTPATIENT)
Dept: CARDIOLOGY | Facility: CLINIC | Age: 64
End: 2024-12-23
Payer: COMMERCIAL

## 2024-12-23 DIAGNOSIS — R00.2 PALPITATIONS: ICD-10-CM

## 2025-01-01 DIAGNOSIS — Z98.61 HISTORY OF PTCA: ICD-10-CM

## 2025-01-01 DIAGNOSIS — E11.9 TYPE 2 DIABETES MELLITUS WITHOUT COMPLICATION, WITHOUT LONG-TERM CURRENT USE OF INSULIN (MULTI): ICD-10-CM

## 2025-01-01 DIAGNOSIS — I25.10 ATHEROSCLEROSIS OF NATIVE CORONARY ARTERY OF NATIVE HEART WITHOUT ANGINA PECTORIS: ICD-10-CM

## 2025-01-01 DIAGNOSIS — I10 PRIMARY HYPERTENSION: ICD-10-CM

## 2025-01-01 DIAGNOSIS — E78.2 MIXED HYPERLIPIDEMIA: ICD-10-CM

## 2025-01-01 DIAGNOSIS — R00.2 PALPITATIONS: ICD-10-CM

## 2025-01-02 RX ORDER — ROSUVASTATIN CALCIUM 40 MG/1
40 TABLET, COATED ORAL DAILY
Qty: 90 TABLET | Refills: 3 | Status: SHIPPED | OUTPATIENT
Start: 2025-01-02 | End: 2026-01-02

## 2025-01-02 RX ORDER — METFORMIN HYDROCHLORIDE 500 MG/1
TABLET, EXTENDED RELEASE ORAL
Refills: 0 | OUTPATIENT
Start: 2025-01-02

## 2025-01-02 RX ORDER — ATENOLOL 50 MG/1
50 TABLET ORAL DAILY
Qty: 90 TABLET | Refills: 3 | Status: SHIPPED | OUTPATIENT
Start: 2025-01-02 | End: 2025-01-09 | Stop reason: ALTCHOICE

## 2025-01-08 ENCOUNTER — OFFICE VISIT (OUTPATIENT)
Dept: GASTROENTEROLOGY | Facility: HOSPITAL | Age: 65
End: 2025-01-08
Payer: COMMERCIAL

## 2025-01-08 VITALS
HEART RATE: 63 BPM | SYSTOLIC BLOOD PRESSURE: 119 MMHG | TEMPERATURE: 97.1 F | WEIGHT: 224.2 LBS | BODY MASS INDEX: 30.41 KG/M2 | OXYGEN SATURATION: 99 % | DIASTOLIC BLOOD PRESSURE: 70 MMHG | RESPIRATION RATE: 20 BRPM

## 2025-01-08 DIAGNOSIS — K76.9 LIVER DISEASE, CHRONIC, WITH CIRRHOSIS (MULTI): Primary | ICD-10-CM

## 2025-01-08 DIAGNOSIS — K74.60 LIVER DISEASE, CHRONIC, WITH CIRRHOSIS (MULTI): Primary | ICD-10-CM

## 2025-01-08 PROCEDURE — 99214 OFFICE O/P EST MOD 30 MIN: CPT | Performed by: STUDENT IN AN ORGANIZED HEALTH CARE EDUCATION/TRAINING PROGRAM

## 2025-01-08 PROCEDURE — 3078F DIAST BP <80 MM HG: CPT | Performed by: STUDENT IN AN ORGANIZED HEALTH CARE EDUCATION/TRAINING PROGRAM

## 2025-01-08 PROCEDURE — 3074F SYST BP LT 130 MM HG: CPT | Performed by: STUDENT IN AN ORGANIZED HEALTH CARE EDUCATION/TRAINING PROGRAM

## 2025-01-08 PROCEDURE — 93272 ECG/REVIEW INTERPRET ONLY: CPT | Performed by: INTERNAL MEDICINE

## 2025-01-08 ASSESSMENT — PAIN SCALES - GENERAL: PAINLEVEL_OUTOF10: 0-NO PAIN

## 2025-01-08 NOTE — PROGRESS NOTES
[facility] HEPATOLOGY CLINIC NOTE     History of Present Illness:   Yuri Reynolds is a 64 y.o. male who presents to clinic for follow-up of compensated cirrhosis.    I previously saw the patient in clinic in September 2024 for history of compensated cirrhosis as well as alcohol associated liver disease.  The patient also did have some metabolic risk factors.  In his workup for chronic liver disease, he was found to have a normal AFP and an MRI of the abdomen was ordered because he had an ultrasound that demonstrated a lobulated lesion within the region of the tani hepatis measuring 2.4 x 2.8 cm.  There is also abnormal enlarged tani hepatic lymph nodes.  The MRI demonstrated enlarged tani hepatis lymph nodes that were likely reactive.  There was only trace ascites.    Today the patient is doing well, he is abstained from all alcohol for the last 5 years.  He has no cravings or reasons to go back to drinking.  He does not smoke or use any illicit drugs.  The patient has been exercising and is making efforts to lose weight.  In September 2024, the patient was admitted to the cardiology service for an elective coronary artery bypass graft.  The patient did well postop and underwent cardiac rehab.  He is having some minimal pain from the sternotomy otherwise is doing well.    The patient has had no decompensating events, he does have some small volume ascites on the MRI but no need for paracentesis or diuretics.  There is been no history of hepatic encephalopathy or esophageal varices.    The patient is currently on atenolol because the patient did have a history of a fast heart rate.  And is following with his cardiologist for this.    Review of Systems  Review of systems otherwise negative.     Social History   reports that he quit smoking about 9 years ago. His smoking use included cigarettes. He has never been exposed to tobacco smoke. He has never used smokeless tobacco. He reports that he does not  currently use alcohol. He reports that he does not use drugs.     Family History  family history includes Diabetes in his mother and sister; Heart attack in his father; Other in his father and another family member.     Allergies  No Known Allergies    Medications  Current Outpatient Medications   Medication Instructions    albuterol 90 mcg/actuation inhaler 2 puffs, inhalation, Every 6 hours PRN    atenolol (TENORMIN) 50 mg, oral, Daily    furosemide (LASIX) 20 mg, oral, Daily PRN    magnesium oxide (MAG-OX) 400 mg, oral, Daily    metFORMIN XR (GLUCOPHAGE-XR) 500 mg, oral, Daily with evening meal, Do not crush, chew, or split.    nitroglycerin (NITROSTAT) 0.4 mg, sublingual, Every 5 min PRN, May repeat dose every 5 minutes for up to 3 doses total.    pantoprazole (PROTONIX) 40 mg, Daily RT    potassium chloride CR (Klor-Con M20) 20 mEq ER tablet 20 mEq, oral, Daily PRN, Do not crush or chew.    rosuvastatin (CRESTOR) 40 mg, oral, Daily        Visit Vitals  /70 (BP Location: Left arm, Patient Position: Sitting, BP Cuff Size: Adult)   Pulse 63   Temp 36.2 °C (97.1 °F) (Temporal)   Resp 20      Physical Exam  Middle-age man in no acute distress, nontoxic-appearing, well-nourished and developed  BMI is 30  Extraocular wounds are intact, there is no conjunctival icterus  Abdomen is soft and nontender to palpation, there is normal active bowel sounds  There is no lower extremity edema    Labs    Lab Results   Component Value Date    WBC 3.9 (L) 11/15/2024    HGB 12.6 (L) 11/15/2024    HCT 39.6 (L) 11/15/2024    MCV 81 11/15/2024    PLT 79 (L) 11/15/2024     Lab Results   Component Value Date    GLUCOSE 82 11/15/2024    CALCIUM 9.4 11/15/2024     11/15/2024    K 4.3 11/15/2024    CO2 28 11/15/2024     11/15/2024    BUN 19 11/15/2024    CREATININE 0.90 11/15/2024     Lab Results   Component Value Date    ALT 32 11/15/2024    AST 26 11/15/2024    ALKPHOS 96 11/15/2024    BILITOT 0.8 11/15/2024     Lab  Results   Component Value Date    INR 1.5 (H) 11/15/2024    INR 1.4 (H) 09/23/2024    INR 1.3 (H) 09/20/2024    PROTIME 16.5 (H) 11/15/2024    PROTIME 16.1 (H) 09/23/2024    PROTIME 15.0 (H) 09/20/2024     ASSESSMENT AND PLAN:    #Compensated cirrhosis  #Alcohol associated liver disease  #Metabolic risk factors    MELD 3.0: 10 at 11/15/2024  2:52 PM  MELD-Na: 11 at 11/15/2024  2:52 PM  Calculated from:  Serum Creatinine: 0.9 mg/dL (Using min of 1 mg/dL) at 11/15/2024  2:52 PM  Serum Sodium: 139 mmol/L (Using max of 137 mmol/L) at 11/15/2024  2:52 PM  Total Bilirubin: 0.8 mg/dL (Using min of 1 mg/dL) at 11/15/2024  2:52 PM  Serum Albumin: 4.6 g/dL (Using max of 3.5 g/dL) at 11/15/2024  2:52 PM  INR(ratio): 1.5 at 11/15/2024  2:52 PM  Age at listing (hypothetical): 64 years  Sex: Male at 11/15/2024  2:52 PM    The patient is well compensated disease, there was some very mild trace ascites seen on MRI from November however this was within a few months after his cardiac surgery and may have been some mild decompensation at this time however overall I would consider him to be compensated.  The patient would benefit from a nonselective beta-blocker, ideally carvedilol given the mortality benefit and benefit of for decompensation free survival.  The patient is currently on atenolol as prescribed by his cardiologist for some palpitations, a Holter monitor demonstrated some wide-complex tachycardia and a 4 beat run of narrow complex tachycardia.  I will need to discuss switching him to carvedilol with his cardiologist.    #At risk for hepatocellular carcinoma  The patient did have a normal AFP however there was an abnormal ultrasound seen previously.  An MRI demonstrated that these are likely reactive lymph nodes and there was no lesions within the liver itself.    #At risk for esophageal varices.  The patient previously had a FibroScan with a liver stiffness measuring above 60, meeting criteria for clinically significant  portal hypertension.  Based on this he would meet criteria for a nonselective beta-blocker to prolong decompensation free survival.  With the treatment of a beta-blocker, he would no longer require an EGD because if he had large varices we will provide him with prophylaxis for bleeding with the beta-blocker anyway.  Will cancel the EGD as this is not needed.    #At risk for micronutrient deficiencies  Discussed the importance of a well-balanced diet with high-protein.  He will need to do this to avoid any sarcopenia.    #At risk for infections  Will need to stay up-to-date with yearly COVID and influenza vaccinations, pneumonia vaccinations and up-to-date hepatitis B and hepatitis A vaccinations.    Dennis Everett MD  [facility], [facility]

## 2025-01-09 ENCOUNTER — TRANSCRIBE ORDERS (OUTPATIENT)
Dept: CARDIOLOGY | Facility: CLINIC | Age: 65
End: 2025-01-09
Payer: COMMERCIAL

## 2025-01-09 RX ORDER — CARVEDILOL 12.5 MG/1
12.5 TABLET ORAL 2 TIMES DAILY
Qty: 180 TABLET | Refills: 3 | Status: SHIPPED | OUTPATIENT
Start: 2025-01-09 | End: 2026-01-09

## 2025-01-09 NOTE — TELEPHONE ENCOUNTER
Atenolol discontinued. Order for carvedilol pended to review and sign       Kip Birmingham MD  [facility] [id number] Card1 Clinical Support Staff  I received a notification from gastroenterology that the preference would be for him to be maintained on carvedilol as opposed to atenolol.  This is secondary to underlying cirrhosis.  I had received this message prior to sending in earlier message to adjust his atenolol upward.  Please discontinue atenolol and instead send in a prescription for carvedilol 12.5 mg twice daily.  Thanks.

## 2025-01-09 NOTE — TELEPHONE ENCOUNTER
----- Message from Kip Birmingham sent at 1/8/2025  4:10 PM EST -----  7-day Zio patch Holter monitor reviewed and looks fine.  Normal rhythm with an average heart rate of 71 bpm.  Rare benign extra beats.  2 very short episodes of fast rhythm lasting only 5 beats at a maximal rate of 150 bpm.  There were 129 patient triggered events.  The vast majority correlated with normal rhythm and on occasion with benign PACs.  No significant atrial or ventricular arrhythmias.  He currently is taking atenolol 50 mg daily.  Okay from my standpoint to increase this to 1-1/2 tabs daily to see if that helps with his symptoms of palpitations.  Otherwise follow-up as scheduled.  Thanks.

## 2025-01-10 ENCOUNTER — APPOINTMENT (OUTPATIENT)
Dept: CARDIOLOGY | Facility: CLINIC | Age: 65
End: 2025-01-10
Payer: COMMERCIAL

## 2025-01-31 ENCOUNTER — APPOINTMENT (OUTPATIENT)
Dept: CARDIOLOGY | Facility: CLINIC | Age: 65
End: 2025-01-31
Payer: COMMERCIAL

## 2025-01-31 VITALS
DIASTOLIC BLOOD PRESSURE: 60 MMHG | SYSTOLIC BLOOD PRESSURE: 114 MMHG | HEIGHT: 72 IN | HEART RATE: 74 BPM | BODY MASS INDEX: 30.07 KG/M2 | WEIGHT: 222 LBS

## 2025-01-31 DIAGNOSIS — R00.2 PALPITATIONS: ICD-10-CM

## 2025-01-31 DIAGNOSIS — I10 PRIMARY HYPERTENSION: ICD-10-CM

## 2025-01-31 DIAGNOSIS — R06.83 SNORING: ICD-10-CM

## 2025-01-31 DIAGNOSIS — R06.81 WITNESSED EPISODE OF APNEA: ICD-10-CM

## 2025-01-31 DIAGNOSIS — I25.2 PAST MYOCARDIAL INFARCTION: ICD-10-CM

## 2025-01-31 DIAGNOSIS — I25.119 CORONARY ARTERY DISEASE INVOLVING NATIVE CORONARY ARTERY OF NATIVE HEART WITH ANGINA PECTORIS: ICD-10-CM

## 2025-01-31 LAB
ANION GAP SERPL CALCULATED.4IONS-SCNC: 10 MMOL/L (CALC) (ref 7–17)
BUN SERPL-MCNC: 16 MG/DL (ref 7–25)
BUN/CREAT SERPL: ABNORMAL (CALC) (ref 6–22)
CALCIUM SERPL-MCNC: 9.4 MG/DL (ref 8.6–10.3)
CHLORIDE SERPL-SCNC: 106 MMOL/L (ref 98–110)
CHOLEST SERPL-MCNC: 137 MG/DL
CHOLEST/HDLC SERPL: 4.2 (CALC)
CO2 SERPL-SCNC: 25 MMOL/L (ref 20–32)
CREAT SERPL-MCNC: 0.89 MG/DL (ref 0.7–1.35)
EGFRCR SERPLBLD CKD-EPI 2021: 96 ML/MIN/1.73M2
EST. AVERAGE GLUCOSE BLD GHB EST-MCNC: 117 MG/DL
EST. AVERAGE GLUCOSE BLD GHB EST-SCNC: 6.5 MMOL/L
GLUCOSE SERPL-MCNC: 117 MG/DL (ref 65–99)
HBA1C MFR BLD: 5.7 % OF TOTAL HGB
HDLC SERPL-MCNC: 33 MG/DL
LDLC SERPL CALC-MCNC: 74 MG/DL (CALC)
NONHDLC SERPL-MCNC: 104 MG/DL (CALC)
POTASSIUM SERPL-SCNC: 4.1 MMOL/L (ref 3.5–5.3)
PSA SERPL-MCNC: 0.45 NG/ML
SODIUM SERPL-SCNC: 141 MMOL/L (ref 135–146)
TRIGL SERPL-MCNC: 199 MG/DL

## 2025-01-31 PROCEDURE — 3078F DIAST BP <80 MM HG: CPT | Performed by: INTERNAL MEDICINE

## 2025-01-31 PROCEDURE — 99214 OFFICE O/P EST MOD 30 MIN: CPT | Performed by: INTERNAL MEDICINE

## 2025-01-31 PROCEDURE — 3074F SYST BP LT 130 MM HG: CPT | Performed by: INTERNAL MEDICINE

## 2025-01-31 PROCEDURE — 3008F BODY MASS INDEX DOCD: CPT | Performed by: INTERNAL MEDICINE

## 2025-01-31 RX ORDER — SILDENAFIL 100 MG/1
100 TABLET, FILM COATED ORAL DAILY PRN
COMMUNITY

## 2025-01-31 RX ORDER — LANOLIN ALCOHOL/MO/W.PET/CERES
400 CREAM (GRAM) TOPICAL DAILY
Qty: 90 TABLET | Refills: 3 | Status: SHIPPED | OUTPATIENT
Start: 2025-01-31 | End: 2026-01-31

## 2025-01-31 RX ORDER — CARVEDILOL 25 MG/1
25 TABLET ORAL 2 TIMES DAILY
Qty: 180 TABLET | Refills: 3 | Status: SHIPPED | OUTPATIENT
Start: 2025-01-31 | End: 2026-01-31

## 2025-01-31 RX ORDER — ASPIRIN 81 MG/1
81 TABLET ORAL DAILY
COMMUNITY

## 2025-01-31 RX ORDER — NITROGLYCERIN 0.4 MG/1
0.4 TABLET SUBLINGUAL EVERY 5 MIN PRN
Qty: 25 TABLET | Refills: 5 | Status: SHIPPED | OUTPATIENT
Start: 2025-01-31 | End: 2026-01-31

## 2025-01-31 NOTE — PROGRESS NOTES
Patient:  Yuri Reynolds  YOB: 1960  MRN: [id number]       HPI:       Yuri Reynolds is a 64 y.o. male who returns today for cardiac follow-up. He has a history of atherosclerotic heart disease with remote inferior wall myocardial infarction 1999. He had an apical myocardial infarction in August 2003. He underwent angioplasty and stenting of the right coronary artery in 1999 and in 2003. A myocardial perfusion study in April 2015 was negative for ischemia with calculated LV ejection fraction of 65%. He has a history of essential hypertension, type 2 diabetes mellitus, and hyperlipidemia. He stopped smoking tobacco back in January 2017.      He previously noted some intermittent palpitations and a Holter monitor showed normal sinus rhythm with occasional PACs and PVCs. His symptoms correlated with the ectopy. He also had some exertional shortness breath in the setting of progressive weight gain. Extensive pulmonary evaluation was unremarkable. An echocardiogram in August 2017 showed normal LV systolic function and normal valvular structure and function. He was treated in 2019 for a bowel obstruction. He was managed conservatively and it resolved. In the process he was diagnosed with cirrhosis of the liver. He was drinking quite heavily at the time but has completely stopped using any alcohol. He also stopped smoking.       He was seen in the office April 10, 2024 and complained of some exertional shortness of breath, fatigue, and diaphoresis.  He was scheduled for an echocardiogram and Lexiscan myocardial perfusion study.  There were some delays in scheduling.  He was seen in consultation at [facility] on July 22, 2024.  He reported ongoing shortness of breath with lesser amounts of exertion.  He was also experiencing intermittent pressure discomfort in his upper chest over the previous few weeks.  He noted that on at least 2 occasions the chest pressure was quite intense and prolonged.  At one point he  thought he might be having a heart attack so he decided to come to the emergency department.  Initial EKG showed normal sinus rhythm with some lateral nonspecific ST-T wave changes.  Repeat EKG was normal.  High-sensitivity troponin levels 4 and 4.  CBC and CMP normal with exception of sodium 135.  CT of the chest showed nonenhancement of right lower lobe subsegmental branches suspicious for pulmonary emboli.  He was placed on a heparin drip and admitted to telemetry.  This was subsequently transitioned to [facility].   He had not had any recent travel.  He was active without limitations.  No recent trauma.       At the time of his office visit August 5, 2024 he noted intermittent exertional chest discomfort.  A Lexiscan myocardial perfusion study August 2, 2024 showed a moderate inferoapical myocardial infarction.  No myocardial ischemia.  Calculated LV ejection fraction of 64%.  He has a prior history of apical myocardial infarction 1999 and inferior myocardial infarction in 2003.  Echocardiogram July 22, 2024 showed an estimated LV ejection fraction 55 to 60% with mild concentric left ventricular hypertrophy.  Trivial to 1+ tricuspid regurgitation with estimated RVSP 23 mmHg.       At the time of his visit August 28, 2024 he described 3 different types of chest discomfort.  He again noted that if he overdid it he would feel a pressure-like discomfort in his upper chest.  This discomfort was radiating into both jaws. He also described some discomfort in the back of his neck, discomfort going down into both forearms, as well as some discomfort in the mid upper abdomen.  No other exacerbating or relieving factors.  The symptoms had improved somewhat after he started on isosorbide mononitrate.  He still felt the need to significantly curtail his activities.  He also described some burning discomfort in his lower chest and upper abdomen when he would wake up about 5 AM to go to the bathroom.  This is different than the  symptoms described above.  He also noted some burning discomfort in his upper chest at times when he is eating.  Some of his symptoms are clearly suggestive of accelerating angina pectoris.  He also has some symptoms suggesting reflux disease and possible gastritis.  Imdur was increased to 120 mg daily.       Cardiac catheterization by Dr. Moser on August 30, 2024 showed 50% stenosis of the mid LAD, 100% stenosis of the mid circumflex, 95% and 80% stenoses of the mid RCA, and 50% stenosis of the PLV B of the RCA.  The circumflex and RCA filled via collaterals.  LV ejection fraction 60%.  Carotid ultrasound showed less than 50% stenosis bilaterally.  He was electively admitted on September 23, 2024 and underwent coronary artery bypass grafting surgery with placement of LIMA to the LAD, radial artery graft to the OM cervical os, and saphenous vein graft to the PDA.  He had sternal plating.  He was treated with postop IV diuretics.  3.7 L negative fluid balance.  He was discharged home on postop day 5.  Hypercoagulable workup was negative.   Lab study September 20, 2024 showed a hemoglobin 11.1 and hematocrit 32.2.  Basic metabolic profile normal with exception of sodium 135 and glucose 116.  Magnesium was 2.08.     At the time of a follow-up visit he related concern about some persistent shortness of breath with minimal exertion.  PA and lateral chest x-ray October 22, 2024 showed right basilar subsegmental atelectasis.  A Holter monitor October 31, 2024 showed normal sinus rhythm with an average heart rate of 72 bpm.  Heart rates ranged from  beats minute.  PVCs comprised 1% of the overall rhythm.  He had one 4 beat run of wide-complex tachycardia at 114 bpm.  7 couplets.  One 4 beat run of atrial tachycardia at 132 bpm.  No specific correlation with his symptoms. Lab studies October 4, 2024 showed hemoglobin 11.1 and hematocrit 34.0.  Basic metabolic profile was normal with exception of glucose 125.   TSH was normal.  BNP level 213.  Hemoglobin A1c 4.7.     He did cardiac rehab for 2 weeks and then returned to work.  He works as a .  He does not generally have to do any heavy lifting.  He reported some persistent discomfort over the left pectoral region.  It was worse with deep inspiration and with palpation.  The pattern of discomfort was predominantly musculoskeletal in nature.  No chest pressure or tightening sensations.  He again noted some mild persistent exertional shortness of breath.   He described hard with skipped beats.  Occasional brief fluttering sensations.      A 7-day Zio patch monitor December 23, 2024 showed normal sinus rhythm with average heart rate 71 bpm.  Normal heart rate variability from 49 bpm at 12:40 AM to 108 bpm at approximately 9:30 PM. There were rare PACs and PVCs comprising less than 1% of the overall rhythm. There were 2 very brief episodes of atrial tachycardia at a maximal rate of 154 bpm. The longest episode was 5 beats. There were 129 patient triggered events. The vast majority of these correlated with normal sinus rhythm or unifocal PACs. No significant bradycardia or tachyarrhythmia.     He called in on January 1, 2025 and requested to come off of Eliquis.  He thought maybe the PE had previously been misdiagnosed.  CT scan of the chest had shown suspicion for PE PE and some small branches of the right lower lobe.  Being as this was greater than 6 months ago we decided to discontinue the Eliquis.  MRI of the liver December 2, 2024 showed cirrhosis and portal hypertension.  Trace ascites and trace left pleural effusion.      He has been doing reasonably well since his last visit.  He continues to note heart palpitations and a vague sinking sensation of his heart with skipped beats.  They are similar to the symptoms he had while wearing the monitor.  He notes some persistent mild discomfort at the lower aspect of the sternotomy.  No drainage.  No chest pressure  or tightening sensations.  He denies any shortness of breath at rest.   He denies any orthopnea, PND, or increasing peripheral edema.  He denies any palpitations, lightheadedness, near-syncope, or syncope.  He denies any fever, chills, or cough.  He denies any nausea or vomiting.  He currently is on carvedilol 12.5 mg twice daily.  This will be increased to 25 mg twice daily.  He has a history of loud snoring and has been noted by his wife to have periods of apnea.  He will be scheduled for a home sleep test to assess for significant underlying sleep apnea.  Lab studies January 30, 2025 showed a normal comprehensive metabolic profile with exception glucose 117.  Cholesterol 137 with HDL 33, LDL 74, and triglycerides 199.  Hemoglobin A1c 5.7.  Previous factor V Leiden result September 2024 was normal. Other details as noted below.    The above portion of this note was dictated by me using voice recognition software. I personally performed the services described in the documentation. The scribe entering the documentation below was in my presence. I affirm that the information is both accurate and complete.      Objective:     Vitals:    01/31/25 1439   BP: 114/60   Pulse: 74       Wt Readings from Last 4 Encounters:   01/08/25 102 kg (224 lb 3.2 oz)   12/02/24 97.5 kg (215 lb)   11/15/24 101 kg (223 lb)   10/30/24 101 kg (222 lb 1.6 oz)       Allergies:     No Known Allergies       Medications:     Current Outpatient Medications   Medication Instructions    albuterol 90 mcg/actuation inhaler 2 puffs, inhalation, Every 6 hours PRN    carvedilol (COREG) 12.5 mg, oral, 2 times daily    furosemide (LASIX) 20 mg, oral, Daily PRN    magnesium oxide (MAG-OX) 400 mg, oral, Daily    metFORMIN XR (GLUCOPHAGE-XR) 500 mg, oral, Daily with evening meal, Do not crush, chew, or split.    nitroglycerin (NITROSTAT) 0.4 mg, sublingual, Every 5 min PRN, May repeat dose every 5 minutes for up to 3 doses total.    pantoprazole (PROTONIX)  40 mg, Daily RT    potassium chloride CR (Klor-Con M20) 20 mEq ER tablet 20 mEq, oral, Daily PRN, Do not crush or chew.    rosuvastatin (CRESTOR) 40 mg, oral, Daily       Physical Examination:   GENERAL:  Well developed, well nourished, in no acute distress.  CHEST:  Symmetric and nontender.  Well-healed sternotomy.  NEURO/PSYCH:  Alert and oriented times three with approppriate behavior and responses.  NECK:  Supple, no JVD, no bruit.  LUNGS:  Clear to auscultation bilaterally, normal respiratory effort.  HEART:  Rate and rhythm regular with no evident murmur, no gallop appreciated.        There are no rubs, clicks or heaves.  EXTREMITIES:  Warm with good color, no clubbing or cyanosis.  There is no edema noted.  PERIPHERAL VASCULAR:  Pulses present and equally palpable; 2+ throughout.      Lab:     CBC:   Lab Results   Component Value Date    WBC 3.9 (L) 11/15/2024    RBC 4.88 11/15/2024    HGB 12.6 (L) 11/15/2024    HCT 39.6 (L) 11/15/2024    PLT 79 (L) 11/15/2024        CMP:    Lab Results   Component Value Date     01/30/2025    K 4.1 01/30/2025     01/30/2025    CO2 25 01/30/2025    BUN 16 01/30/2025    CREATININE 0.89 01/30/2025    GLUCOSE 117 (H) 01/30/2025    CALCIUM 9.4 01/30/2025       Lipid Profile:    Lab Results   Component Value Date    TRIG 199 (H) 01/30/2025    HDL 33 (L) 01/30/2025    LDLCALC 74 01/30/2025       BMP:  Lab Results   Component Value Date     01/30/2025     11/15/2024     10/29/2024     10/04/2024    K 4.1 01/30/2025    K 4.3 11/15/2024    K 4.2 10/29/2024    K 4.3 10/04/2024     01/30/2025     11/15/2024     10/29/2024     10/04/2024    CO2 25 01/30/2025    CO2 28 11/15/2024    CO2 29 10/29/2024    CO2 24 10/04/2024    BUN 16 01/30/2025    BUN 19 11/15/2024    BUN 15 10/29/2024    BUN 18 10/04/2024    CREATININE 0.89 01/30/2025    CREATININE 0.90 11/15/2024    CREATININE 0.90 10/29/2024    CREATININE 0.81 10/04/2024        CBC:  Lab Results   Component Value Date    WBC 3.9 (L) 11/15/2024    WBC 4.9 10/04/2024    WBC 6.5 09/28/2024    RBC 4.88 11/15/2024    RBC 3.96 (L) 10/04/2024    RBC 3.83 (L) 09/28/2024    HGB 12.6 (L) 11/15/2024    HGB 11.1 (L) 10/04/2024    HGB 11.1 (L) 09/28/2024    HCT 39.6 (L) 11/15/2024    HCT 34.0 (L) 10/04/2024    HCT 32.2 (L) 09/28/2024    MCV 81 11/15/2024    MCV 86 10/04/2024    MCV 84 09/28/2024    MCH 25.8 (L) 11/15/2024    MCH 28.0 10/04/2024    MCH 29.0 09/28/2024    MCHC 31.8 (L) 11/15/2024    MCHC 32.6 10/04/2024    MCHC 34.5 09/28/2024    RDW 13.9 11/15/2024    RDW 14.7 (H) 10/04/2024    RDW 14.5 09/28/2024    PLT 79 (L) 11/15/2024     (L) 10/04/2024     (L) 09/28/2024       Hepatic Function Panel:    Lab Results   Component Value Date    ALKPHOS 96 11/15/2024    ALT 32 11/15/2024    AST 26 11/15/2024    PROT 6.9 11/15/2024    BILITOT 0.8 11/15/2024    BILIDIR 0.2 09/20/2024       HgBA1c:    Lab Results   Component Value Date    HGBA1C 5.7 (H) 01/30/2025       Magnesium:    Lab Results   Component Value Date    MG 2.08 09/28/2024       TSH:    Lab Results   Component Value Date    TSH 1.39 10/04/2024       BNP:   Lab Results   Component Value Date     (H) 10/04/2024        PT/INR:    Lab Results   Component Value Date    PROTIME 16.5 (H) 11/15/2024    INR 1.5 (H) 11/15/2024       Cardiac Enzymes:    Lab Results   Component Value Date    TROPHS 4 07/21/2024    TROPHS 4 07/21/2024       Diagnostic Studies:     MR liver w and wo IV contrast  Result Date: 12/2/2024      1.  Cirrhosis and portal hypertension. 2. No focal suspicious hepatic lesions. 3. Borderline enlarged tani hepatis lymph nodes which are likely reactive. The largest lymph node may correlate with the recent ultrasound finding. 4. Trace ascites. 5. Trace left pleural effusion.     MACRO: None   Signed by: Allen Gunn 12/2/2024 3:54 PM       Problem List:     Patient Active Problem List   Diagnosis     Abnormal LFTs    Coronary artery disease involving native coronary artery of native heart with angina pectoris    Bilateral sensorineural hearing loss    Chronic nasal congestion    Cirrhosis (Multi)    Cough    Dizziness    Elevated serum creatinine    Eustachian tube dysfunction    Fatty liver    Hearing loss, bilateral    History of PTCA    Hyperlipidemia    Hypersplenism    Hypertension    Inability to maintain erection    Insomnia    Memory changes    Nicotine dependence    Palpitations    Past myocardial infarction    Retained myringotomy tube    Small bowel obstruction (Multi)    Somnolence, daytime    Thrombocytopenia (CMS-HCC)    Type 2 diabetes mellitus without complication, without long-term current use of insulin (Multi)    History of alcohol abuse    Class 1 obesity with serious comorbidity and body mass index (BMI) of 33.0 to 33.9 in adult    Abnormal finding in urine    Alcohol abuse    Anal discharge    Bursitis of elbow    Shortness of breath    Dysfunction of both eustachian tubes    History of cardiac catheterization    History of ear disorder    History of pulmonary embolism    Inflammatory dermatosis    Lateral epicondylitis of both elbows    Portal hypertension (Multi)    Tinnitus, right ear    Pulmonary embolism without acute cor pulmonale, unspecified chronicity, unspecified pulmonary embolism type (Multi)    Chest pain    Angina pectoris, unstable (Multi)    Atherosclerosis of native coronary artery of native heart without angina pectoris    Chest pain, unspecified type    Status post coronary artery bypass graft    Diaphoresis       Asessment:     Problem List Items Addressed This Visit             ICD-10-CM    Coronary artery disease involving native coronary artery of native heart with angina pectoris I25.119    Relevant Medications    sildenafil (Viagra) 100 mg tablet    carvedilol (Coreg) 25 mg tablet    nitroglycerin (Nitrostat) 0.4 mg SL tablet    Other Relevant Orders    Follow Up In  Cardiology    Snoring R06.83    Relevant Orders    Follow Up In Cardiology    Home sleep apnea test (HSAT)    Hypertension I10    Relevant Medications    carvedilol (Coreg) 25 mg tablet    Other Relevant Orders    Follow Up In Cardiology    Palpitations R00.2    Relevant Medications    magnesium oxide (Mag-Ox) 400 mg (241.3 mg magnesium) tablet    Other Relevant Orders    Follow Up In Cardiology    Past myocardial infarction I25.2    Relevant Medications    nitroglycerin (Nitrostat) 0.4 mg SL tablet    Other Relevant Orders    Follow Up In Cardiology    Witnessed episode of apnea R06.81    Relevant Orders    Follow Up In Cardiology    Home sleep apnea test (HSAT)

## 2025-01-31 NOTE — PATIENT INSTRUCTIONS
INCREASE CARVEDILOL TO 25 MG TWICE A DAY.  YOU CAN USE UP YOUR CURRENT SUPPLY OF 12.5 MG TABLETS BY TAKING 2-12.5 MG TABLETS TOGETHER TWICE A DAY.  A NEW PRESCRIPTION FOR 25 MG TABLETS ONCE A DAY HAS BEEN SENT TO YOUR PHARMACY.    PER DR BURDEN YOU DO NOT NEED FUROSEMIDE OR POTASSIUM.    DID YOU KNOW  We have a pharmacy here in the [facility].  They can fill all prescriptions, not just cardiac medications.  Prescriptions from other pharmacies can easily be transferred to the  pharmacy by the  pharmacist on site.   pharmacies offer FREE HOME DELIVERY on medications to anywhere in [place]. They can sync your medications. Typically prescriptions can be ready in 10 - 15 minutes. If pharmacy is unable to fill your  prescription or if cost is more than your paying now the Pharmacist can easily transfer back to your Pharmacy of choice. Pharmacy phone # [phone].     Please bring all medicines, vitamins, and herbal supplements with you in original bottles to every appointment! This is the best way to ensure your medication list in your chart is accurate.    Prescriptions will not be filled unless you are compliant with your follow up appointments or have a follow up appointment scheduled as per instruction of your physician. Refills should be requested at the time of your visit.

## 2025-02-03 ENCOUNTER — APPOINTMENT (OUTPATIENT)
Dept: PRIMARY CARE | Facility: CLINIC | Age: 65
End: 2025-02-03
Payer: COMMERCIAL

## 2025-02-03 VITALS
SYSTOLIC BLOOD PRESSURE: 127 MMHG | HEART RATE: 61 BPM | HEIGHT: 72 IN | TEMPERATURE: 97.7 F | OXYGEN SATURATION: 98 % | WEIGHT: 224.7 LBS | DIASTOLIC BLOOD PRESSURE: 73 MMHG | BODY MASS INDEX: 30.43 KG/M2

## 2025-02-03 DIAGNOSIS — F10.21 ALCOHOL DEPENDENCE, IN REMISSION: ICD-10-CM

## 2025-02-03 DIAGNOSIS — R68.82 DECREASED LIBIDO: ICD-10-CM

## 2025-02-03 DIAGNOSIS — K76.6 PORTAL HYPERTENSION (MULTI): ICD-10-CM

## 2025-02-03 DIAGNOSIS — E78.2 MIXED HYPERLIPIDEMIA: ICD-10-CM

## 2025-02-03 DIAGNOSIS — I25.10 ATHEROSCLEROSIS OF NATIVE CORONARY ARTERY OF NATIVE HEART WITHOUT ANGINA PECTORIS: ICD-10-CM

## 2025-02-03 DIAGNOSIS — D69.6 THROMBOCYTOPENIA (CMS-HCC): ICD-10-CM

## 2025-02-03 DIAGNOSIS — E11.9 TYPE 2 DIABETES MELLITUS WITHOUT COMPLICATION, WITHOUT LONG-TERM CURRENT USE OF INSULIN: ICD-10-CM

## 2025-02-03 DIAGNOSIS — E66.811 CLASS 1 OBESITY WITH SERIOUS COMORBIDITY AND BODY MASS INDEX (BMI) OF 30.0 TO 30.9 IN ADULT, UNSPECIFIED OBESITY TYPE: ICD-10-CM

## 2025-02-03 DIAGNOSIS — I26.99 PULMONARY EMBOLISM WITHOUT ACUTE COR PULMONALE, UNSPECIFIED CHRONICITY, UNSPECIFIED PULMONARY EMBOLISM TYPE (MULTI): ICD-10-CM

## 2025-02-03 DIAGNOSIS — I10 PRIMARY HYPERTENSION: Primary | ICD-10-CM

## 2025-02-03 DIAGNOSIS — K70.30 ALCOHOLIC CIRRHOSIS OF LIVER WITHOUT ASCITES (MULTI): ICD-10-CM

## 2025-02-03 PROCEDURE — 3078F DIAST BP <80 MM HG: CPT | Performed by: FAMILY MEDICINE

## 2025-02-03 PROCEDURE — 99214 OFFICE O/P EST MOD 30 MIN: CPT | Performed by: FAMILY MEDICINE

## 2025-02-03 PROCEDURE — 3008F BODY MASS INDEX DOCD: CPT | Performed by: FAMILY MEDICINE

## 2025-02-03 PROCEDURE — 3074F SYST BP LT 130 MM HG: CPT | Performed by: FAMILY MEDICINE

## 2025-02-03 RX ORDER — METFORMIN HYDROCHLORIDE 500 MG/1
500 TABLET, EXTENDED RELEASE ORAL
Qty: 90 TABLET | Refills: 0 | Status: SHIPPED | OUTPATIENT
Start: 2025-02-03

## 2025-02-03 ASSESSMENT — PATIENT HEALTH QUESTIONNAIRE - PHQ9
2. FEELING DOWN, DEPRESSED OR HOPELESS: NOT AT ALL
1. LITTLE INTEREST OR PLEASURE IN DOING THINGS: NOT AT ALL
SUM OF ALL RESPONSES TO PHQ9 QUESTIONS 1 AND 2: 0

## 2025-02-03 NOTE — PROGRESS NOTES
Subjective     Patient ID: Yuri Reynolds is a 65 y.o. male who presents for Follow-up.    HPI     No Covid or flu shot at this time.       He reports he stopped the pantoprazole.   Has not had GERD symptoms off the medication.    Patient has noted some decreased libido.      Review labs : 01/30/2025- includes PSA   Colon: 03/2019       Patient has hypertension.  Pt does not monitor BP readings at home.   Patient is compliant with his antihypertensive therapy and denies any noted side effects.  He follows up with a cardiologist.      He has hyperlipidemia.  Pt does try to limit the amount of fatty foods and high cholesterol foods that are consumed.  He is compliant with the dosing of his statin therapy and denies any noted side effects.     Patient has DM type 2.   Patient does try to limit the amount of carbohydrates that are consumed.  Denies any hypoglycemic symptoms or readings.  He continues to tolerate the medications well.  He states that he does regularly check his blood sugar.      Patient has coronary artery disease and follows with cardiology as well.  9/23/2024:  Pt underwent CABG x3  He denies an CP, SOB, orthopnea, or PND.     He has cirrhosis of the liver.  Patient has continued to avoid alcohol.  He was previously evaluated by hepatology, Dr. Hartmann, but has not followed up since October 2019.  He denies any jaundice or changes in stool/urine color.  Patient also denies any melena or hematochezia.     He has had thrombocytopenia on previous labs.  He denies any abnormal bruising or bleeding at this time.     Review of Systems    Cardiovascular: Patient denies any chest pain, shortness of breath with exertion, tachycardia, palpitations, orthopnea, or paroxysmal nocturnal dyspnea.  Respiratory: Patient denies any cough, shortness breath, or wheezing.  Gastrointestinal: Patient denies any nausea, vomiting, diarrhea, constipation, melena, hematochezia, or reflux symptoms  Skin: Denies any rashes or skin  lesions  Neurology: Patient denies any new motor or sensory losses. Denies any numbness, tingling, weakness, and incoordination of the extremities. Patient also denies any tremor, seizures, or gait instability.  Endocrinology: Denies any polyuria, polydipsia, polyphagia, or heat/cold intolerance.  Psychiatric: He denies any depression, anxiety, or suicidal/homicidal ideation.    Objective   /73   Pulse 61   Temp 36.5 °C (97.7 °F) (Temporal)   Ht 1.829 m (6')   Wt 102 kg (224 lb 11.2 oz)   SpO2 98%   BMI 30.47 kg/m²     Physical Exam    General Appearance: Alert and cooperative, in no acute distress, well-developed/well-nourished overweight male.   Neck: Supple and without adenopathy or rigidity. There is no JVD at 90° and no carotid bruits are noted. There is no thyromegaly, thyroid tenderness, or palpable thyroid nodules.  Heart: Regular rate and rhythm without murmur or ectopy.  Lungs: Clear to auscultation bilaterally with good air exchange.  Skin: Good turgor, moist, warm and without rashes or lesions.  Neurological exam: Alert and oriented ×3, no tremor, normal gait.  Extremities: No clubbing, cyanosis, or edema  Psychiatric: Appropriate mood and affect, good insight and judgment, no delusions or thought disorders, no suicidal or homicidal ideation    Assessment/Plan   Assessment & Plan  Primary hypertension    Stable based on in office readings.  Blood pressure appears adequately controlled and we will continue with the current antihypertensive therapy.    Orders:    Comprehensive Metabolic Panel; Future    Follow Up In [facility] - PCP - Established; Future    Mixed hyperlipidemia    Stable based on last labs.  Patient will continue with the current medication.  Dietary changes, exercise, and maintenance of healthy weight were discussed at length.  Lab Results   Component Value Date    CHOL 137 01/30/2025    HDL 33 (L) 01/30/2025    LDLCALC 74 01/30/2025    TRIG 199 (H) 01/30/2025     CHHDL 4.2 01/30/2025    VLDL 36 07/26/2024    NHDL 104 01/30/2025        Orders:    Comprehensive Metabolic Panel; Future    Follow Up In [facility] - PCP - Established; Future    Type 2 diabetes mellitus without complication, without long-term current use of insulin    Lab Results   Component Value Date    HGBA1C 5.7 (H) 01/30/2025   Stable based on last labs.  Patient was encouraged to have an annual eye exam and check his feet on a regular basis for callus formation or wounds.    Orders:    metFORMIN  mg 24 hr tablet; Take 1 tablet (500 mg) by mouth once daily in the evening. Take with meals. Do not crush, chew, or split.    Albumin-Creatinine Ratio, Urine Random; Future    Hemoglobin A1C; Future    Comprehensive Metabolic Panel; Future    Follow Up In [facility] - PCP - Established; Future    Atherosclerosis of native coronary artery of native heart without angina pectoris    Patient is without worrisome signs or symptoms for unstable angina.    Risk factor modification was discussed at length.    Dietary changes, exercise and maintenance of a healthy weight were discussed.  Underwent CABG times 3 (9/23/2024).    Orders:    Follow Up In [facility] - PCP - Established; Future    Alcoholic cirrhosis of liver without ascites (Multi)    He has continued to abstain from alcohol.  He has delayed follow up with hepatology and has not had the Fibroscan ordered by hepatology.  Pt is declining follow up with hepatology at this time.    Orders:    Follow Up In [facility] - PCP - Established; Future    Thrombocytopenia (CMS-HCC)    Stable based on most recent lab evaluation.   We will continue to monitor.  Denies any abnormal bruising or bleeding.  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  Lab Results   Component Value Date    WBC 3.9 (L) 11/15/2024    HGB 12.6 (L) 11/15/2024    HCT 39.6 (L) 11/15/2024     MCV 81 11/15/2024    PLT 79 (L) 11/15/2024       Orders:    CBC and Auto Differential; Future    Follow Up In [facility] - PCP - Established; Future    Pulmonary embolism without acute cor pulmonale, unspecified chronicity, unspecified pulmonary embolism type (Multi)    Pt had a PE earlier 2024 and has been on anticoagulation.  Was referred to hematology at his 7/30/2024 visit.  He had an appt with Dr. Abraham and a hypercoagulable workup which came back normal.   He will continue the Eliquis as prescribed.    Orders:    Follow Up In [facility] - PCP - Established; Future    Class 1 obesity with serious comorbidity and body mass index (BMI) of 30.0 to 30.9 in adult, unspecified obesity type    Dietary changes, exercise, and maintenance of a healthy weight were discussed at length.  Goal is to achieve a BMI less than 25.    Orders:    Follow Up In [facility] - PCP - Established; Future    Decreased libido    We will check a testosterone level.  Orders:    Testosterone, total and free; Future    Alcohol dependence, in remission  Pt has continued to be alcohol free and was encouraged to continue avoidance.       Portal hypertension (Multi)  Due to his cirrhosis.  Stable based on symptoms.  Will continue to monitor.             PSA due 12/7/2024  COLONOSCOPY DUE 3/7/2022          Scribe Attestation  By signing my name below, IJulia Scribe   attest that this documentation has been prepared under the direction and in the presence of Marc Harding DO.    Requested Prescriptions     Signed Prescriptions Disp Refills    metFORMIN  mg 24 hr tablet 90 tablet 0     Sig: Take 1 tablet (500 mg) by mouth once daily in the evening. Take with meals. Do not crush, chew, or split.     Orders Placed This Encounter   Procedures    Albumin-Creatinine Ratio, Urine Random    Hemoglobin A1C    Comprehensive Metabolic Panel    CBC and Auto Differential    Testosterone, total and free

## 2025-02-03 NOTE — ASSESSMENT & PLAN NOTE
Pt had a PE earlier 2024 and has been on anticoagulation.  Was referred to hematology at his 7/30/2024 visit.  He had an appt with Dr. Abraham and a hypercoagulable workup which came back normal.   He will continue the Eliquis as prescribed.    Orders:    Follow Up In [facility] - PCP - Established; Future

## 2025-02-03 NOTE — PATIENT INSTRUCTIONS
Follow up in 3 months with labs to be done PRIOR.    It was a pleasure to see you today. Thank you for choosing us for your health care needs.    If you have lab or other testing completed and have not been informed of results within one week, please call the office for your results.    If you haven't done so, consider signing up for [facility], the [facility] personal health record. Ask the staff how you can get started.

## 2025-02-03 NOTE — ASSESSMENT & PLAN NOTE
Lab Results   Component Value Date    HGBA1C 5.7 (H) 01/30/2025   Stable based on last labs.  Patient was encouraged to have an annual eye exam and check his feet on a regular basis for callus formation or wounds.    Orders:    metFORMIN  mg 24 hr tablet; Take 1 tablet (500 mg) by mouth once daily in the evening. Take with meals. Do not crush, chew, or split.    Albumin-Creatinine Ratio, Urine Random; Future    Hemoglobin A1C; Future    Comprehensive Metabolic Panel; Future    Follow Up In [facility] - PCP - Established; Future

## 2025-02-03 NOTE — ASSESSMENT & PLAN NOTE
He has continued to abstain from alcohol.  He has delayed follow up with hepatology and has not had the Fibroscan ordered by hepatology.  Pt is declining follow up with hepatology at this time.    Orders:    Follow Up In [facility] - PCP - Established; Future

## 2025-02-03 NOTE — ASSESSMENT & PLAN NOTE
Patient is without worrisome signs or symptoms for unstable angina.    Risk factor modification was discussed at length.    Dietary changes, exercise and maintenance of a healthy weight were discussed.  Underwent CABG times 3 (9/23/2024).    Orders:    Follow Up In [facility] - PCP - Established; Future

## 2025-02-03 NOTE — ASSESSMENT & PLAN NOTE
Stable based on in office readings.  Blood pressure appears adequately controlled and we will continue with the current antihypertensive therapy.    Orders:    Comprehensive Metabolic Panel; Future    Follow Up In [facility] - PCP - Established; Future

## 2025-02-03 NOTE — ASSESSMENT & PLAN NOTE
Stable based on most recent lab evaluation.   We will continue to monitor.  Denies any abnormal bruising or bleeding.  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  Lab Results   Component Value Date    WBC 3.9 (L) 11/15/2024    HGB 12.6 (L) 11/15/2024    HCT 39.6 (L) 11/15/2024    MCV 81 11/15/2024    PLT 79 (L) 11/15/2024       Orders:    CBC and Auto Differential; Future    Follow Up In [facility] - PCP - Established; Future

## 2025-02-03 NOTE — ASSESSMENT & PLAN NOTE
Stable based on last labs.  Patient will continue with the current medication.  Dietary changes, exercise, and maintenance of healthy weight were discussed at length.  Lab Results   Component Value Date    CHOL 137 01/30/2025    HDL 33 (L) 01/30/2025    LDLCALC 74 01/30/2025    TRIG 199 (H) 01/30/2025    CHHDL 4.2 01/30/2025    VLDL 36 07/26/2024    NHDL 104 01/30/2025        Orders:    Comprehensive Metabolic Panel; Future    Follow Up In [facility] - PCP - Established; Future

## 2025-02-07 ENCOUNTER — TELEPHONE (OUTPATIENT)
Dept: GASTROENTEROLOGY | Facility: HOSPITAL | Age: 65
End: 2025-02-07
Payer: COMMERCIAL

## 2025-02-12 ENCOUNTER — PATIENT OUTREACH (OUTPATIENT)
Dept: CARE COORDINATION | Age: 65
End: 2025-02-12
Payer: COMMERCIAL

## 2025-02-18 ENCOUNTER — PATIENT OUTREACH (OUTPATIENT)
Dept: CARE COORDINATION | Age: 65
End: 2025-02-18
Payer: COMMERCIAL

## 2025-02-19 ENCOUNTER — CLINICAL SUPPORT (OUTPATIENT)
Dept: SLEEP MEDICINE | Facility: HOSPITAL | Age: 65
End: 2025-02-19
Payer: COMMERCIAL

## 2025-02-19 DIAGNOSIS — R06.81 WITNESSED EPISODE OF APNEA: ICD-10-CM

## 2025-02-19 DIAGNOSIS — R06.83 SNORING: ICD-10-CM

## 2025-02-19 NOTE — PROGRESS NOTES
Type of Study: HOME SLEEP STUDY - NOMAD     The patient received equipment and instructions for use of the [employer] [facility] Nomad HSAT device. The patient was instructed how to apply the effort belts, cannula, thermistor. It was also explained how the Nomad and oximeter components work.  The patient was asked to record their sleep for an 8-hour period.     The patient was informed of their responsibility for the device and acknowledged this by signing the HSAT device contract. The patient was asked to return the device on tomorow between the hours of 9a to the [facility].     The patient was instructed to call 911 as usual for any medical- emergencies while at home.  The patient was also given a phone number for troubleshooting when using the device in case there were additional questions.

## 2025-02-21 ENCOUNTER — PATIENT OUTREACH (OUTPATIENT)
Dept: CARE COORDINATION | Age: 65
End: 2025-02-21
Payer: COMMERCIAL

## 2025-02-26 ENCOUNTER — TELEPHONE (OUTPATIENT)
Dept: CARDIOLOGY | Facility: CLINIC | Age: 65
End: 2025-02-26
Payer: COMMERCIAL

## 2025-02-26 NOTE — TELEPHONE ENCOUNTER
----- Message from Kip Birmingham sent at 2/25/2025  1:46 PM EST -----  Sleep study reviewed and looks fine.  No significant obstructive sleep apnea.  Continue same and follow-up as scheduled.  Thanks.

## 2025-02-26 NOTE — TELEPHONE ENCOUNTER
Called patient who was not available.  Left voice mail instructing patient to call office or check his MyChart as results message sent.  Zabrina Dahl, CMA

## 2025-04-01 PROBLEM — F10.21 ALCOHOL DEPENDENCE, IN REMISSION: Status: ACTIVE | Noted: 2025-04-01

## 2025-05-03 DIAGNOSIS — R68.82 DECREASED LIBIDO: ICD-10-CM

## 2025-05-03 DIAGNOSIS — I10 PRIMARY HYPERTENSION: ICD-10-CM

## 2025-05-03 DIAGNOSIS — D69.6 THROMBOCYTOPENIA: ICD-10-CM

## 2025-05-03 DIAGNOSIS — E11.9 TYPE 2 DIABETES MELLITUS WITHOUT COMPLICATION, WITHOUT LONG-TERM CURRENT USE OF INSULIN: ICD-10-CM

## 2025-05-03 DIAGNOSIS — E78.2 MIXED HYPERLIPIDEMIA: ICD-10-CM

## 2025-05-08 ENCOUNTER — APPOINTMENT (OUTPATIENT)
Dept: PRIMARY CARE | Facility: CLINIC | Age: 65
End: 2025-05-08
Payer: COMMERCIAL

## 2025-05-08 VITALS
TEMPERATURE: 97.7 F | DIASTOLIC BLOOD PRESSURE: 77 MMHG | OXYGEN SATURATION: 97 % | HEIGHT: 72 IN | SYSTOLIC BLOOD PRESSURE: 126 MMHG | WEIGHT: 228.5 LBS | BODY MASS INDEX: 30.95 KG/M2 | HEART RATE: 62 BPM

## 2025-05-08 DIAGNOSIS — I10 PRIMARY HYPERTENSION: ICD-10-CM

## 2025-05-08 DIAGNOSIS — K70.30 ALCOHOLIC CIRRHOSIS OF LIVER WITHOUT ASCITES (MULTI): ICD-10-CM

## 2025-05-08 DIAGNOSIS — E66.811 CLASS 1 OBESITY WITH SERIOUS COMORBIDITY AND BODY MASS INDEX (BMI) OF 30.0 TO 30.9 IN ADULT, UNSPECIFIED OBESITY TYPE: ICD-10-CM

## 2025-05-08 DIAGNOSIS — I26.99 PULMONARY EMBOLISM WITHOUT ACUTE COR PULMONALE, UNSPECIFIED CHRONICITY, UNSPECIFIED PULMONARY EMBOLISM TYPE (MULTI): ICD-10-CM

## 2025-05-08 DIAGNOSIS — E11.9 TYPE 2 DIABETES MELLITUS WITHOUT COMPLICATION, WITHOUT LONG-TERM CURRENT USE OF INSULIN: ICD-10-CM

## 2025-05-08 DIAGNOSIS — I25.10 ATHEROSCLEROSIS OF NATIVE CORONARY ARTERY OF NATIVE HEART WITHOUT ANGINA PECTORIS: ICD-10-CM

## 2025-05-08 DIAGNOSIS — D69.6 THROMBOCYTOPENIA: ICD-10-CM

## 2025-05-08 DIAGNOSIS — E78.2 MIXED HYPERLIPIDEMIA: ICD-10-CM

## 2025-05-08 PROCEDURE — 3008F BODY MASS INDEX DOCD: CPT | Performed by: FAMILY MEDICINE

## 2025-05-08 PROCEDURE — 3078F DIAST BP <80 MM HG: CPT | Performed by: FAMILY MEDICINE

## 2025-05-08 PROCEDURE — 99214 OFFICE O/P EST MOD 30 MIN: CPT | Performed by: FAMILY MEDICINE

## 2025-05-08 PROCEDURE — 3074F SYST BP LT 130 MM HG: CPT | Performed by: FAMILY MEDICINE

## 2025-05-08 RX ORDER — METFORMIN HYDROCHLORIDE 500 MG/1
500 TABLET, EXTENDED RELEASE ORAL
Qty: 90 TABLET | Refills: 0 | Status: SHIPPED | OUTPATIENT
Start: 2025-05-08

## 2025-05-08 ASSESSMENT — PATIENT HEALTH QUESTIONNAIRE - PHQ9
2. FEELING DOWN, DEPRESSED OR HOPELESS: NOT AT ALL
SUM OF ALL RESPONSES TO PHQ9 QUESTIONS 1 AND 2: 0
1. LITTLE INTEREST OR PLEASURE IN DOING THINGS: NOT AT ALL

## 2025-05-08 NOTE — PROGRESS NOTES
Subjective   Patient ID: Yuri Reynolds is a 65 y.o. male who presents for Follow-up.    HPI     No new concerns     Pt states he is still a little sore from his CABG (9/23/2024).    Review labs: 05/06/2025  PSA: 01/30/2025  Colon: 03/06/2019      Patient has hypertension.  Pt does not monitor BP readings at home.   Patient is compliant with his antihypertensive therapy and denies any noted side effects.  He follows up with a cardiologist.      He has hyperlipidemia.  Pt does try to limit the amount of fatty foods and high cholesterol foods that are consumed.  He is compliant with the dosing of his statin therapy and denies any noted side effects.     Patient has DM type 2.   Patient does try to limit the amount of carbohydrates that are consumed.  Denies any hypoglycemic symptoms or readings.  He continues to tolerate the medications well.  He states that he does regularly check his blood sugar.      Patient has coronary artery disease and follows with cardiology as well.  9/23/2024:  Pt underwent CABG x3  He denies an CP, SOB, orthopnea, or PND.     He has cirrhosis of the liver.  Patient has continued to avoid alcohol.  He was previously evaluated by hepatology, Dr. Hartmann, but has not followed up since October 2019.  He denies any jaundice or changes in stool/urine color.  Patient also denies any melena or hematochezia.     He has thrombocytopenia based on previous labs.  He denies any abnormal bruising or bleeding at this time.       Review of Systems  Constitutional: Patient denies any fever, chills, loss of appetite, or unexplained weight loss.  Cardiovascular: Patient denies any chest pain, shortness of breath with exertion, tachycardia, palpitations, orthopnea, or paroxysmal nocturnal dyspnea.  Respiratory: Patient denies any cough, shortness of breath, or wheezing.  Gastrointestinal: Patient denies any nausea, vomiting, diarrhea, constipation, melena, hematochezia, or reflux symptoms  Skin: Denies any  rashes or skin lesions  Neurology: Patient denies any new motor or sensory losses. Denies any numbness, tingling, weakness, and incoordination of the extremities. Patient also denies any tremor, seizures, or gait instability.  Endocrinology: Denies any polyuria, polydipsia, polyphagia, or heat/cold intolerance.  Psychiatric: Patient denies any depression, or suicidal/homicidal ideation.    Objective   /77 (BP Location: Right arm, Patient Position: Sitting, BP Cuff Size: Adult)   Pulse 62   Temp 36.5 °C (97.7 °F) (Temporal)   Ht 1.829 m (6')   Wt 104 kg (228 lb 8 oz)   SpO2 97%   BMI 30.99 kg/m²     Physical Exam  General Appearance: Alert and cooperative, in no acute distress, well-developed/well-nourished obese male.    Neck: Supple and without adenopathy or rigidity. There is no JVD at 90° and no carotid bruits are noted. There is no thyromegaly, thyroid tenderness, or palpable thyroid nodules.  Heart: Regular rate and rhythm without murmur or ectopy.  Lungs: Clear to auscultation bilaterally with good air exchange.  Skin: Good turgor, moist, warm and without rashes or lesions.  Neurological exam: Alert and oriented ×3, no tremor, normal gait.  Extremities: No clubbing, cyanosis, or edema  Psychiatric: Appropriate mood and affect, good insight and judgment, no delusions or thought disorders, no suicidal or homicidal ideation    Assessment/Plan     Primary hypertension:  Stable based on in office readings.  Blood pressure appears adequately controlled and we will continue with the current antihypertensive therapy.     Mixed hyperlipidemia:  Stable based on last labs.  Patient will continue with the current medication.  Dietary changes, exercise, and maintenance of healthy weight were discussed at length.  Lab Results   Component Value Date    CHOL 137 01/30/2025    HDL 33 (L) 01/30/2025    LDLCALC 74 01/30/2025    TRIG 199 (H) 01/30/2025    CHHDL 4.2 01/30/2025    VLDL 36 07/26/2024    NHDL 104  01/30/2025          Type 2 diabetes mellitus without complication, without long-term current use of insulin:  Lab Results   Component Value Date    HGBA1C 5.8 (H) 05/06/2025   Stable based on last labs.  Patient was encouraged to have an annual eye exam and check his feet on a regular basis for callus formation or wounds.       Atherosclerosis of native coronary artery of native heart without angina pectoris:   Patient is without worrisome signs or symptoms for unstable angina.    Risk factor modification was discussed at length.    Dietary changes, exercise and maintenance of a healthy weight were discussed.  Underwent CABG times 3 (9/23/2024).        Alcoholic cirrhosis of liver without ascites (Multi):  He has continued to abstain from alcohol.  Following with hepatology, Dr. Everett.     Thrombocytopenia:  Stable based on most recent lab evaluation.   We will continue to monitor.  Denies any abnormal bruising or bleeding.  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  Lab Results   Component Value Date    WBC 3.4 (L) 05/06/2025    HGB 14.1 05/06/2025    HCT 43.2 05/06/2025    MCV 82.4 05/06/2025    PLT 62 (L) 05/06/2025          Pulmonary embolism without acute cor pulmonale, unspecified chronicity, unspecified pulmonary embolism type (Multi):  Pt had a PE in 2024 and was treated with anticoagulation.  Was referred to hematology at his 7/30/2024 visit.  He had an appt with Dr. Abraham and a hypercoagulable workup which came back normal.       Class 1 obesity with serious comorbidity and body mass index (BMI) of 30.0 to 30.9 in adult, unspecified obesity type:  Dietary changes, exercise, and maintenance of a healthy weight were discussed at length.  Goal is to achieve a BMI less than 25.        PSA DUE 12/7/2024  COLONOSCOPY DUE 3/7/2022          Scribe Attestation  By signing my name below, I, Bo Ramirez , Screvie   attest that this  documentation has been prepared under the direction and in the presence of Marc Harding DO.     Orders Placed This Encounter   Procedures    Basic Metabolic Panel    Hemoglobin A1C    TSH with reflex to Free T4 if abnormal     Requested Prescriptions     Pending Prescriptions Disp Refills    metFORMIN  mg 24 hr tablet 90 tablet 0     Sig: Take 1 tablet (500 mg) by mouth once daily in the evening. Take with meals. Do not crush, chew, or split.

## 2025-05-08 NOTE — PATIENT INSTRUCTIONS
Follow up in 3 months with labs to be done PRIOR.    It was a pleasure to see you today. Thank you for choosing us for your health care needs.    If you have lab or other testing completed and have not been informed of results within one week, please call the office for your results.    If you haven't done so, consider signing up for [facility], the [facility] personal health record. Ask the staff how you can get started.

## 2025-05-10 LAB
ALBUMIN SERPL-MCNC: 4.6 G/DL (ref 3.6–5.1)
ALBUMIN/CREAT UR: 6 MG/G CREAT
ALP SERPL-CCNC: 78 U/L (ref 35–144)
ALT SERPL-CCNC: 24 U/L (ref 9–46)
ANION GAP SERPL CALCULATED.4IONS-SCNC: 10 MMOL/L (CALC) (ref 7–17)
AST SERPL-CCNC: 22 U/L (ref 10–35)
BASOPHILS # BLD AUTO: 20 CELLS/UL (ref 0–200)
BASOPHILS NFR BLD AUTO: 0.6 %
BILIRUB SERPL-MCNC: 0.9 MG/DL (ref 0.2–1.2)
BUN SERPL-MCNC: 17 MG/DL (ref 7–25)
CALCIUM SERPL-MCNC: 9.2 MG/DL (ref 8.6–10.3)
CHLORIDE SERPL-SCNC: 104 MMOL/L (ref 98–110)
CO2 SERPL-SCNC: 24 MMOL/L (ref 20–32)
CREAT SERPL-MCNC: 0.85 MG/DL (ref 0.7–1.35)
CREAT UR-MCNC: 167 MG/DL (ref 20–320)
EGFRCR SERPLBLD CKD-EPI 2021: 97 ML/MIN/1.73M2
EOSINOPHIL # BLD AUTO: 41 CELLS/UL (ref 15–500)
EOSINOPHIL NFR BLD AUTO: 1.2 %
ERYTHROCYTE [DISTWIDTH] IN BLOOD BY AUTOMATED COUNT: 14.9 % (ref 11–15)
EST. AVERAGE GLUCOSE BLD GHB EST-MCNC: 120 MG/DL
EST. AVERAGE GLUCOSE BLD GHB EST-SCNC: 6.6 MMOL/L
GLUCOSE SERPL-MCNC: 124 MG/DL (ref 65–99)
HBA1C MFR BLD: 5.8 %
HCT VFR BLD AUTO: 43.2 % (ref 38.5–50)
HGB BLD-MCNC: 14.1 G/DL (ref 13.2–17.1)
LYMPHOCYTES # BLD AUTO: 826 CELLS/UL (ref 850–3900)
LYMPHOCYTES NFR BLD AUTO: 24.3 %
MCH RBC QN AUTO: 26.9 PG (ref 27–33)
MCHC RBC AUTO-ENTMCNC: 32.6 G/DL (ref 32–36)
MCV RBC AUTO: 82.4 FL (ref 80–100)
MICROALBUMIN UR-MCNC: 1 MG/DL
MONOCYTES # BLD AUTO: 320 CELLS/UL (ref 200–950)
MONOCYTES NFR BLD AUTO: 9.4 %
NEUTROPHILS # BLD AUTO: 2193 CELLS/UL (ref 1500–7800)
NEUTROPHILS NFR BLD AUTO: 64.5 %
PLATELET # BLD AUTO: 62 THOUSAND/UL (ref 140–400)
PMV BLD REES-ECKER: 10.8 FL (ref 7.5–12.5)
POTASSIUM SERPL-SCNC: 4 MMOL/L (ref 3.5–5.3)
PROT SERPL-MCNC: 6.9 G/DL (ref 6.1–8.1)
RBC # BLD AUTO: 5.24 MILLION/UL (ref 4.2–5.8)
SERVICE CMNT-IMP: ABNORMAL
SODIUM SERPL-SCNC: 138 MMOL/L (ref 135–146)
TESTOST FREE SERPL-MCNC: 37.7 PG/ML (ref 35–155)
TESTOST SERPL-MCNC: 401 NG/DL (ref 250–1100)
WBC # BLD AUTO: 3.4 THOUSAND/UL (ref 3.8–10.8)

## 2025-05-14 ENCOUNTER — PATIENT MESSAGE (OUTPATIENT)
Dept: PRIMARY CARE | Facility: CLINIC | Age: 65
End: 2025-05-14
Payer: COMMERCIAL

## 2025-05-24 ENCOUNTER — APPOINTMENT (OUTPATIENT)
Dept: URGENT CARE | Age: 65
End: 2025-05-24
Payer: COMMERCIAL

## 2025-08-01 ENCOUNTER — APPOINTMENT (OUTPATIENT)
Dept: CARDIOLOGY | Facility: CLINIC | Age: 65
End: 2025-08-01
Payer: COMMERCIAL

## 2025-08-08 DIAGNOSIS — E11.9 TYPE 2 DIABETES MELLITUS WITHOUT COMPLICATION, WITHOUT LONG-TERM CURRENT USE OF INSULIN: ICD-10-CM

## 2025-08-08 DIAGNOSIS — I10 PRIMARY HYPERTENSION: ICD-10-CM

## 2025-08-09 LAB
ANION GAP SERPL CALCULATED.4IONS-SCNC: 10 MMOL/L (CALC) (ref 7–17)
BUN SERPL-MCNC: 16 MG/DL (ref 7–25)
BUN/CREAT SERPL: ABNORMAL (CALC) (ref 6–22)
CALCIUM SERPL-MCNC: 9.5 MG/DL (ref 8.6–10.3)
CHLORIDE SERPL-SCNC: 105 MMOL/L (ref 98–110)
CO2 SERPL-SCNC: 23 MMOL/L (ref 20–32)
CREAT SERPL-MCNC: 0.94 MG/DL (ref 0.7–1.35)
EGFRCR SERPLBLD CKD-EPI 2021: 91 ML/MIN/1.73M2
EST. AVERAGE GLUCOSE BLD GHB EST-MCNC: 120 MG/DL
EST. AVERAGE GLUCOSE BLD GHB EST-SCNC: 6.6 MMOL/L
GLUCOSE SERPL-MCNC: 126 MG/DL (ref 65–99)
HBA1C MFR BLD: 5.8 %
POTASSIUM SERPL-SCNC: 4.3 MMOL/L (ref 3.5–5.3)
SODIUM SERPL-SCNC: 138 MMOL/L (ref 135–146)
TSH SERPL-ACNC: 1.94 MIU/L (ref 0.4–4.5)

## 2025-08-12 ENCOUNTER — APPOINTMENT (OUTPATIENT)
Dept: PRIMARY CARE | Facility: CLINIC | Age: 65
End: 2025-08-12
Payer: COMMERCIAL

## 2025-08-12 PROBLEM — I26.99 PULMONARY EMBOLISM WITHOUT ACUTE COR PULMONALE, UNSPECIFIED CHRONICITY, UNSPECIFIED PULMONARY EMBOLISM TYPE (MULTI): Status: RESOLVED | Noted: 2024-07-21 | Resolved: 2025-08-12

## 2025-09-08 ENCOUNTER — APPOINTMENT (OUTPATIENT)
Dept: CARDIOLOGY | Facility: CLINIC | Age: 65
End: 2025-09-08
Payer: COMMERCIAL

## 2025-12-08 ENCOUNTER — APPOINTMENT (OUTPATIENT)
Dept: PRIMARY CARE | Facility: CLINIC | Age: 65
End: 2025-12-08
Payer: COMMERCIAL

## (undated) DEVICE — TUBING, INSUFFLATION, LAPAROSCOPIC, FILTER, 10 FT

## (undated) DEVICE — SPONGE, LAP, XRAY DECT, 18IN X 18IN, W/MASTER DMT, STERILE

## (undated) DEVICE — PACING CABLE, EXTENSION, 12 FT BEIGE, DISPOSABLE

## (undated) DEVICE — CLIP, SPRING, BULLDOG, FOGARTY, SOFT JAW, 6 MM, LF

## (undated) DEVICE — CANNULA, VESSEL, FREE FLOW, BLUNT TIP, 3 MM X 5 CM

## (undated) DEVICE — BANDAGE, QUIKCLOT, INTERVENTIONAL HEMO, W/O SLIT

## (undated) DEVICE — MANIFOLD, 4 PORT NEPTUNE STANDARD

## (undated) DEVICE — HANDLE, LITE EZ, PLASTIC, DISP, LF

## (undated) DEVICE — SUTURE, MONOCRYL, 3-0, 18 IN, PS2, UNDYED

## (undated) DEVICE — SUTURE, STEEL, 7, 18 IN, CCS, SILVER

## (undated) DEVICE — APPLICATOR, CHLORAPREP, W/ORANGE TINT, 26ML

## (undated) DEVICE — STRAP, VELCRO, BODY, 4 X 60IN, NS

## (undated) DEVICE — SUTURE, MONOCRYL, 4-0, 27 IN, PS-2, UNDYED

## (undated) DEVICE — SYRINGE, 10 CC, LUER LOCK

## (undated) DEVICE — GOWN, SURGICAL, SMARTGOWN, XLARGE, STERILE

## (undated) DEVICE — TOWEL, OR, XRAY DETECT 5 PK, WHITE, 17X26, W/DMT TAG, ST

## (undated) DEVICE — CANNULA, EOPA 20F W/O GUIDEWIRE

## (undated) DEVICE — PERFUSION PACK, HEMOCONCENTRATOR, 0.25 TUBING 36 IN LONG

## (undated) DEVICE — CATHETER, IV, ANGIOCATH, 16 G X 1.88 IN, FEP POLYMER

## (undated) DEVICE — SUTURE, PROLENE 4-0, TAPER POINT, SH-1 BLUE 30 INCH

## (undated) DEVICE — GOWN, SURGICAL, ROYAL SILK, LG, STERILE

## (undated) DEVICE — SUTURE, VICRYL 0, TAPER POINT, CT-1 VIOLET 27 INCH

## (undated) DEVICE — CATHETER, THORACIC 28FR RIGHT ANGLE

## (undated) DEVICE — ADAPTER, CARDIOPLEGIA, VENTING, Y, 19.1 CM

## (undated) DEVICE — LEAD, PACING, MYOCARDIAL, BIPOLAR, TEMPORARY

## (undated) DEVICE — OXYGENATOR FX 25, W/HR, ARTERIAL FILTER

## (undated) DEVICE — GLOVE, SURGICAL, PROTEXIS PI , 6.5, PF, LF

## (undated) DEVICE — RESERVOIRE, ATR 120 COLLECTION

## (undated) DEVICE — CATHETER, VENT, LEFT HEART, MULTI PORT TIP, W/MALLEABLE INTRODUCER, ADULT, 16 FR X 41 CM

## (undated) DEVICE — SPONGE, HEMOSTATIC, GELATIN, SURGIFOAM, 8 X 12.5 CM X 10 MM

## (undated) DEVICE — ATS SUCTION LINE

## (undated) DEVICE — BLOWER MISTER KIT, CLEARVIEW, MALLEABLE SHAFT, W/TUBING, 16.5 CM

## (undated) DEVICE — CATHETER, DIAGNOSTIC, JUDKINS, LEFT, 5 FR-JL 4.0

## (undated) DEVICE — SET, AUTOTRANSFUSION, AT1

## (undated) DEVICE — SPONGE, HEMOSTAT, SURGICEL FIBRILLAR, ABS, 4 X 4, LF

## (undated) DEVICE — ADHESIVE, SKIN, DERMABOND ADVANCED, 15CM, PEN-STYLE

## (undated) DEVICE — SUTURE, PROLENE, 7-0, 30 IN, BV 175-8, BLUE

## (undated) DEVICE — DRIVER, HI-TORQUE, Z-DRIVER

## (undated) DEVICE — DRAPE, SHEET, XL

## (undated) DEVICE — TUBING, MANIFOLD, LOW PRESSURE

## (undated) DEVICE — Device

## (undated) DEVICE — SUTURE, VICRYL, 3-0, 27 IN, CT-1, UNDYED

## (undated) DEVICE — PACING CABLE, EXTENSION, 12 FT BLUE, DISPOSABLE

## (undated) DEVICE — CANNULA, VENOUS 2 STAGE 32/40

## (undated) DEVICE — INSERT, CLAMP, FOGARTY, SOFTJAW, 86MM, LF

## (undated) DEVICE — DRAPE, SHEET, CARDIOVASCULAR, ANTIMICROBIAL, W/ANESTHESIA SCREEN, IOBAN 2, STERI DRAPE, 107 X 133 IN, DISPOSABLE, FABRIC, BLUE, STERILE

## (undated) DEVICE — FILTER, IV, BLOOD, MICROAGGREGATE, 40 MIC, RBC TRANSFUSION

## (undated) DEVICE — CLIP, LIGATING, HORIZON, MEDIUM, TITANIUM

## (undated) DEVICE — KIT, TOURNIQUET, 7"

## (undated) DEVICE — KNIFE, MICRO, 15 DEG BLADE AND TIP, DISP

## (undated) DEVICE — CATHETER, DIAGNOSTIC, 5FR,  PIG-145, 110CM, 6SH ANGLED

## (undated) DEVICE — SYRINGE, 20 CC, LUER LOCK

## (undated) DEVICE — CLIP, LIGATING, HORIZON, LARGE, TITANIUM

## (undated) DEVICE — CONNECTOR, STRAIGHT, 0.5 X 0.5 IN

## (undated) DEVICE — CLOSURE SYSTEM, VASCULAR, VASCADE, 5 F

## (undated) DEVICE — BAG, DECANTER

## (undated) DEVICE — CATHETER, THORACIC, STRAIGHT, SOFT, TAPER TIP, 32 FR

## (undated) DEVICE — SOLUTION, SODIUM CHLORIDE 0.9%, 3000ML, BAG

## (undated) DEVICE — SUTURE, NUROLON, 0, 18 IN, CT1, DETACHABLE, MULTIPACK, BLACK

## (undated) DEVICE — SHEATH, PINNACLE, W/.038 GW 10CM, 5FR INTRODUCER, 2.5 CM DIALATOR

## (undated) DEVICE — SPONGE GAUZE, XRAY SC+RFID, 4X4 16 PLY, STERILE

## (undated) DEVICE — STATLOCK, FOLEY SECURE, 3-WAY

## (undated) DEVICE — MICROCOAGULATION TEST, ACT+ TEST CUVETTE

## (undated) DEVICE — GEL, ECOVUE, ULTRASOUND, 20GRAM, STERILE

## (undated) DEVICE — DEVICE, ENDOSCOPIC VESSEL HARVESTING, SAPHENA VENAPAX

## (undated) DEVICE — SUTURE, PROLENE, 4-0, 36 IN, RB1, DA, BLUE

## (undated) DEVICE — SUTURE, PROLENE, 2-0, 36 IN, SH, DA, BLUE

## (undated) DEVICE — TOWEL PACK, STERILE, 16X24, XRAY DETECTABLE, BLUE, 4/PK

## (undated) DEVICE — SHUNT, SENSOR

## (undated) DEVICE — CLIP, LIGATING, HORIZON, WIDE SLOT, SMALL, TITANIUM

## (undated) DEVICE — BOWL, BASIN, 32 OZ, STERILE

## (undated) DEVICE — SOLUTION, INJECTION, USP, PLASMALYTE A, PH 7.4, TYPE 1, 1000 ML, BAG

## (undated) DEVICE — BLADE, STERNUM, 32MM CUT EDGE

## (undated) DEVICE — DRAPE, FLUID WARMING

## (undated) DEVICE — SUTURE, ETHIBOND, XTRA, 30 IN, 0, CT-1, GREEN

## (undated) DEVICE — PROTECTOR, NERVE, ULNAR, PINK

## (undated) DEVICE — TIP, SUCTION, YANKAUER, FLEXIBLE

## (undated) DEVICE — SUTURE, PROLENE, 6-0, 30 IN, C-1, CV-11, BLUE

## (undated) DEVICE — CATHETER, URETHRAL, FOLEY, 2 WAY, CARSON, COUDE TIP, 18 FR, 5 CC

## (undated) DEVICE — TUBING, SUCTION, CONNECTING, NON-CONDUCTIVE, SURE GRIP CONNECTORS, 3/16 X 18 IN, PVC

## (undated) DEVICE — LABEL KIT, MEDICATION, OR EMC, STERILE

## (undated) DEVICE — GOWN, SURGICAL, ROYAL SILK, XL, STERILE

## (undated) DEVICE — HOLSTER, JET SAFETY

## (undated) DEVICE — BLADE, ZDRIVE, STERNALOCK XP RIGID FIXATION

## (undated) DEVICE — NEEDLE, SAFETY, 18 G X 1.5 IN

## (undated) DEVICE — SUTURE, PROLENE, 5-0, 36 IN, C-1, CV-11, BLUE

## (undated) DEVICE — FLOSEAL, MATRIX, HEMOSTATIC, FULL STERILE PREP, 5ML

## (undated) DEVICE — CANNULA, AORTIC, ROOT, STANDARD, FLANGE, RADIOPAQUE TIP, W/FLOW-GUARD, 9 FR X 14 CM

## (undated) DEVICE — COVER, EQUIPMENT, SOLUTION, SLUSH, 112 X 168 CM, LF, STERILE

## (undated) DEVICE — TUBING PACK, OXYGENATOR, ADULT

## (undated) DEVICE — HANDLE, PH, FOR YANKAUER SUCTION DEVICE

## (undated) DEVICE — TAPE, PAPER, SURGICAL, MICROPORE, 3 IN X 10 YD, LF

## (undated) DEVICE — ELECTRODE, ELECTROSURGICAL, BLADE, 4IN, UN-INSULATED

## (undated) DEVICE — BLANKET, WATER, MUL-T-PAD, 15 X 22 IN, DISPOSABLE

## (undated) DEVICE — CASSETTE, BLOOD, PLEGIC SET

## (undated) DEVICE — SOLUTION, INJECTION, USP, SODIUM CHLORIDE 0.9%, .9, NACL, 1000 ML, BAG

## (undated) DEVICE — PRE-CLEAN KIT, BEDSIDE, FIRST STEP

## (undated) DEVICE — COVER, BACK TABLE

## (undated) DEVICE — CATHETER, INFINITI DIAGNOSTIC, 5 FR 100CM 3DRC, WILLIAMS RIGHT OR NO TORQUE